# Patient Record
Sex: MALE | Race: BLACK OR AFRICAN AMERICAN | NOT HISPANIC OR LATINO | Employment: OTHER | URBAN - METROPOLITAN AREA
[De-identification: names, ages, dates, MRNs, and addresses within clinical notes are randomized per-mention and may not be internally consistent; named-entity substitution may affect disease eponyms.]

---

## 2017-03-02 DIAGNOSIS — I10 ESSENTIAL HYPERTENSION: Chronic | ICD-10-CM

## 2017-03-02 RX ORDER — ALBUTEROL SULFATE 90 UG/1
1-2 AEROSOL, METERED RESPIRATORY (INHALATION) EVERY 6 HOURS PRN
Qty: 1 INHALER | Refills: 0 | Status: SHIPPED | OUTPATIENT
Start: 2017-03-02 | End: 2017-03-03 | Stop reason: SDUPTHER

## 2017-03-02 RX ORDER — AMLODIPINE BESYLATE 10 MG/1
10 TABLET ORAL DAILY
Qty: 90 TABLET | Refills: 1 | Status: SHIPPED | OUTPATIENT
Start: 2017-03-02 | End: 2017-08-30 | Stop reason: SDUPTHER

## 2017-03-02 NOTE — TELEPHONE ENCOUNTER
----- Message from Erma Rudolph sent at 3/1/2017 10:11 AM CST -----  Contact: Self  Refill : amlodipine (NORVASC) 10 MG tablet    fluticasone-salmeterol 250-50 mcg/dose (ADVAIR DISKUS) 250-50 mcg/dose diskus inhaler        [id number] - [place], [place] - [address] [facility] [facility] AT [facility]

## 2017-03-02 NOTE — TELEPHONE ENCOUNTER
Patient is completely out of Amlodipine,has at least a 5 day supply of Advair.I scheduled an appointment with Dr. Self 3/7/2017

## 2017-03-03 DIAGNOSIS — J44.9 CHRONIC OBSTRUCTIVE PULMONARY DISEASE, UNSPECIFIED COPD TYPE: Primary | Chronic | ICD-10-CM

## 2017-03-03 RX ORDER — ALBUTEROL SULFATE 90 UG/1
1-2 AEROSOL, METERED RESPIRATORY (INHALATION) EVERY 6 HOURS PRN
Qty: 1 INHALER | Refills: 0 | Status: SHIPPED | OUTPATIENT
Start: 2017-03-03 | End: 2018-06-04 | Stop reason: SDUPTHER

## 2017-03-07 ENCOUNTER — OFFICE VISIT (OUTPATIENT)
Dept: FAMILY MEDICINE | Facility: CLINIC | Age: 77
End: 2017-03-07
Payer: MEDICARE

## 2017-03-07 VITALS
OXYGEN SATURATION: 95 % | SYSTOLIC BLOOD PRESSURE: 114 MMHG | DIASTOLIC BLOOD PRESSURE: 74 MMHG | WEIGHT: 148.25 LBS | HEART RATE: 96 BPM | TEMPERATURE: 98 F | BODY MASS INDEX: 21.96 KG/M2 | HEIGHT: 69 IN

## 2017-03-07 DIAGNOSIS — I10 ESSENTIAL HYPERTENSION: Primary | Chronic | ICD-10-CM

## 2017-03-07 DIAGNOSIS — J44.9 CHRONIC OBSTRUCTIVE PULMONARY DISEASE, UNSPECIFIED COPD TYPE: Chronic | ICD-10-CM

## 2017-03-07 DIAGNOSIS — Z72.0 TOBACCO ABUSE: Chronic | ICD-10-CM

## 2017-03-07 PROCEDURE — 99213 OFFICE O/P EST LOW 20 MIN: CPT | Mod: PBBFAC,PO | Performed by: FAMILY MEDICINE

## 2017-03-07 PROCEDURE — 99999 PR PBB SHADOW E&M-EST. PATIENT-LVL III: CPT | Mod: PBBFAC,,, | Performed by: FAMILY MEDICINE

## 2017-03-07 PROCEDURE — 99214 OFFICE O/P EST MOD 30 MIN: CPT | Mod: S$PBB,,, | Performed by: FAMILY MEDICINE

## 2017-03-07 RX ORDER — IBUPROFEN 200 MG
1 TABLET ORAL DAILY
Qty: 30 PATCH | Refills: 5 | Status: SHIPPED | OUTPATIENT
Start: 2017-03-07 | End: 2019-05-08

## 2017-03-07 RX ORDER — FLUTICASONE PROPIONATE AND SALMETEROL 250; 50 UG/1; UG/1
1 POWDER RESPIRATORY (INHALATION) 2 TIMES DAILY
Qty: 180 EACH | Refills: 3 | Status: SHIPPED | OUTPATIENT
Start: 2017-03-07 | End: 2017-08-30 | Stop reason: SDUPTHER

## 2017-03-07 NOTE — PROGRESS NOTES
[facility]  Progress Note    SUBJECTIVE:     Chief Complaint   Patient presents with    Establish [facility]       HPI   [place] GEETHA Wilson  is a 76 y.o. male here to establish care and for follow up of his COPD, HTN. He is on 4L oxygen continuously, but admits he is still smoking 4-5 cigarettes daily. Denies any SOB, chest pain, n/v.     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    Vocal cord polyps      Past Surgical History:   Procedure Laterality Date    TONSILLECTOMY       Family History   Problem Relation Age of Onset    Heart disease Father     Hypertension Father     Hypertension Son     Heart disease Maternal Uncle     Hypertension Maternal Grandmother     Hypertension Maternal Grandfather     Kidney disease Mother      Social History   Substance Use Topics    Smoking status: Current Some Day Smoker     Packs/day: 1.00     Years: 55.00    Smokeless tobacco: None      Comment: has been weaning down    Alcohol use 3.6 oz/week     6 Cans of beer per week      Comment: 1-2  DRINKS A WEEK        Review of Systems   Constitutional: Negative for chills, fever and malaise/fatigue.   HENT: Negative.    Respiratory: Negative.  Negative for cough and shortness of breath.    Cardiovascular: Negative.  Negative for chest pain.   Gastrointestinal: Negative.  Negative for abdominal pain, nausea and vomiting.   Genitourinary: Negative.    Neurological: Negative for weakness and headaches.   All other systems reviewed and are negative.    OBJECTIVE:     Vitals:    03/07/17 0943   BP: 114/74   Pulse: 96   Temp: 97.7 °F (36.5 °C)     Body mass index is 21.89 kg/(m^2).    Physical Exam   Constitutional: He is oriented  to person, place, and time and well-developed, well-nourished, and in no distress. No distress.   HENT:   Head: Normocephalic and atraumatic.   Eyes: Conjunctivae and EOM are normal.   Cardiovascular: Normal rate, regular rhythm and normal heart sounds.  Exam reveals no gallop and no friction rub.    No murmur heard.  Pulmonary/Chest: Effort normal and breath sounds normal. No respiratory distress. He has no wheezes. He has no rales.   Abdominal: Soft. Bowel sounds are normal. He exhibits no distension. There is no tenderness.   Neurological: He is alert and oriented to person, place, and time.   Skin: Skin is warm. He is not diaphoretic.       Old records were reviewed. Labs and/or images were independently reviewed.    ASSESSMENT     1. Essential hypertension    2. Chronic obstructive pulmonary disease, unspecified COPD type    3. Tobacco abuse        PLAN:     Essential hypertension   -     Stable. Continue current regimen.    Chronic obstructive pulmonary disease, unspecified COPD type  -     fluticasone-salmeterol 250-50 mcg/dose (ADVAIR DISKUS) 250-50 mcg/dose diskus inhaler; Inhale 1 puff into the lungs 2 (two) times daily.  Dispense: 180 each; Refill: 3  -     Still on 4-5 cigarettes daily, while on 4 L of oxygen continuously. Advised that he needs to QUIT SMOKING.    Tobacco abuse  -     nicotine (NICODERM CQ) 14 mg/24 hr; Place 1 patch onto the skin once daily.  Dispense: 30 patch; Refill: 5  -     Counseled patient about importance of smoking cessation. Patient ready to quit. Will start smoking cessation treatment options.    RTC PRN    Lewis Self MD  03/07/2017 10:02 AM

## 2017-08-30 DIAGNOSIS — I10 ESSENTIAL HYPERTENSION: Chronic | ICD-10-CM

## 2017-08-30 DIAGNOSIS — J44.9 CHRONIC OBSTRUCTIVE PULMONARY DISEASE, UNSPECIFIED COPD TYPE: Chronic | ICD-10-CM

## 2017-08-30 NOTE — TELEPHONE ENCOUNTER
----- Message from Erma Rudolph sent at 8/30/2017 11:47 AM CDT -----  Contact: Self  Refill : amlodipine (NORVASC) 10 MG tablet             fluticasone-salmeterol 250-50 mcg/dose (ADVAIR DISKUS) 250-50 mcg/dose diskus inhaler    Pharmacy     [id number] - [place] - [address] [place], [place] [address] AT [facility]

## 2017-08-31 RX ORDER — FLUTICASONE PROPIONATE AND SALMETEROL 250; 50 UG/1; UG/1
1 POWDER RESPIRATORY (INHALATION) 2 TIMES DAILY
Qty: 180 EACH | Refills: 2 | Status: SHIPPED | OUTPATIENT
Start: 2017-08-31 | End: 2018-06-04 | Stop reason: SDUPTHER

## 2017-08-31 RX ORDER — AMLODIPINE BESYLATE 10 MG/1
10 TABLET ORAL DAILY
Qty: 90 TABLET | Refills: 2 | Status: SHIPPED | OUTPATIENT
Start: 2017-08-31 | End: 2018-05-31 | Stop reason: SDUPTHER

## 2017-10-18 ENCOUNTER — HOSPITAL ENCOUNTER (INPATIENT)
Facility: HOSPITAL | Age: 77
LOS: 1 days | Discharge: HOME | DRG: 190 | End: 2017-10-19
Attending: EMERGENCY MEDICINE | Admitting: HOSPITALIST
Payer: MEDICARE

## 2017-10-18 DIAGNOSIS — J44.1 COPD EXACERBATION: Primary | ICD-10-CM

## 2017-10-18 PROBLEM — J96.21 ACUTE ON CHRONIC RESPIRATORY FAILURE WITH HYPOXIA AND HYPERCAPNIA: Status: ACTIVE | Noted: 2017-10-18

## 2017-10-18 PROBLEM — J96.22 ACUTE ON CHRONIC RESPIRATORY FAILURE WITH HYPOXIA AND HYPERCAPNIA: Status: ACTIVE | Noted: 2017-10-18

## 2017-10-18 LAB
ALBUMIN SERPL BCP-MCNC: 3.6 G/DL
ALP SERPL-CCNC: 74 U/L
ALT SERPL W/O P-5'-P-CCNC: 37 U/L
ANION GAP SERPL CALC-SCNC: 12 MMOL/L
AST SERPL-CCNC: 45 U/L
BASOPHILS # BLD AUTO: 0.02 K/UL
BASOPHILS NFR BLD: 0.3 %
BILIRUB SERPL-MCNC: 0.5 MG/DL
BNP SERPL-MCNC: 16 PG/ML
BUN SERPL-MCNC: 15 MG/DL
CALCIUM SERPL-MCNC: 9.4 MG/DL
CHLORIDE SERPL-SCNC: 99 MMOL/L
CO2 SERPL-SCNC: 31 MMOL/L
CREAT SERPL-MCNC: 1 MG/DL
DIFFERENTIAL METHOD: ABNORMAL
EOSINOPHIL # BLD AUTO: 0.2 K/UL
EOSINOPHIL NFR BLD: 3.5 %
ERYTHROCYTE [DISTWIDTH] IN BLOOD BY AUTOMATED COUNT: 15.8 %
EST. GFR  (AFRICAN AMERICAN): >60 ML/MIN/1.73 M^2
EST. GFR  (NON AFRICAN AMERICAN): >60 ML/MIN/1.73 M^2
GLUCOSE SERPL-MCNC: 83 MG/DL
HCT VFR BLD AUTO: 43.3 %
HGB BLD-MCNC: 14.4 G/DL
LYMPHOCYTES # BLD AUTO: 0.9 K/UL
LYMPHOCYTES NFR BLD: 13.7 %
MCH RBC QN AUTO: 28.2 PG
MCHC RBC AUTO-ENTMCNC: 33.3 G/DL
MCV RBC AUTO: 85 FL
MONOCYTES # BLD AUTO: 0.9 K/UL
MONOCYTES NFR BLD: 14 %
NEUTROPHILS # BLD AUTO: 4.3 K/UL
NEUTROPHILS NFR BLD: 68 %
PLATELET # BLD AUTO: 198 K/UL
PMV BLD AUTO: 10 FL
POTASSIUM SERPL-SCNC: 3.6 MMOL/L
PROT SERPL-MCNC: 7.5 G/DL
RBC # BLD AUTO: 5.11 M/UL
SODIUM SERPL-SCNC: 142 MMOL/L
TROPONIN I SERPL DL<=0.01 NG/ML-MCNC: 0.01 NG/ML
TROPONIN I SERPL DL<=0.01 NG/ML-MCNC: <0.006 NG/ML
WBC # BLD AUTO: 6.36 K/UL

## 2017-10-18 PROCEDURE — 83880 ASSAY OF NATRIURETIC PEPTIDE: CPT

## 2017-10-18 PROCEDURE — 96374 THER/PROPH/DIAG INJ IV PUSH: CPT

## 2017-10-18 PROCEDURE — 94640 AIRWAY INHALATION TREATMENT: CPT | Mod: 76

## 2017-10-18 PROCEDURE — 36415 COLL VENOUS BLD VENIPUNCTURE: CPT

## 2017-10-18 PROCEDURE — 84484 ASSAY OF TROPONIN QUANT: CPT | Mod: 91

## 2017-10-18 PROCEDURE — 99284 EMERGENCY DEPT VISIT MOD MDM: CPT | Mod: 25

## 2017-10-18 PROCEDURE — 11000001 HC ACUTE MED/SURG PRIVATE ROOM

## 2017-10-18 PROCEDURE — 63600175 PHARM REV CODE 636 W HCPCS: Performed by: EMERGENCY MEDICINE

## 2017-10-18 PROCEDURE — 85025 COMPLETE CBC W/AUTO DIFF WBC: CPT

## 2017-10-18 PROCEDURE — 93005 ELECTROCARDIOGRAM TRACING: CPT

## 2017-10-18 PROCEDURE — 80053 COMPREHEN METABOLIC PANEL: CPT

## 2017-10-18 PROCEDURE — 25000242 PHARM REV CODE 250 ALT 637 W/ HCPCS: Performed by: EMERGENCY MEDICINE

## 2017-10-18 PROCEDURE — 93010 ELECTROCARDIOGRAM REPORT: CPT | Mod: ,,, | Performed by: INTERNAL MEDICINE

## 2017-10-18 RX ORDER — ONDANSETRON 2 MG/ML
4 INJECTION INTRAMUSCULAR; INTRAVENOUS EVERY 12 HOURS PRN
Status: DISCONTINUED | OUTPATIENT
Start: 2017-10-18 | End: 2017-10-19 | Stop reason: HOSPADM

## 2017-10-18 RX ORDER — ALBUTEROL SULFATE 2.5 MG/.5ML
2.5 SOLUTION RESPIRATORY (INHALATION)
Status: COMPLETED | OUTPATIENT
Start: 2017-10-18 | End: 2017-10-18

## 2017-10-18 RX ORDER — PANTOPRAZOLE SODIUM 40 MG/1
40 TABLET, DELAYED RELEASE ORAL DAILY
Status: DISCONTINUED | OUTPATIENT
Start: 2017-10-19 | End: 2017-10-19 | Stop reason: HOSPADM

## 2017-10-18 RX ORDER — IPRATROPIUM BROMIDE 0.5 MG/2.5ML
0.5 SOLUTION RESPIRATORY (INHALATION)
Status: COMPLETED | OUTPATIENT
Start: 2017-10-18 | End: 2017-10-18

## 2017-10-18 RX ORDER — METHYLPREDNISOLONE SOD SUCC 125 MG
125 VIAL (EA) INJECTION EVERY 6 HOURS
Status: DISCONTINUED | OUTPATIENT
Start: 2017-10-18 | End: 2017-10-19 | Stop reason: HOSPADM

## 2017-10-18 RX ORDER — ENOXAPARIN SODIUM 100 MG/ML
40 INJECTION SUBCUTANEOUS EVERY 24 HOURS
Status: DISCONTINUED | OUTPATIENT
Start: 2017-10-18 | End: 2017-10-19 | Stop reason: HOSPADM

## 2017-10-18 RX ORDER — ACETAMINOPHEN 325 MG/1
650 TABLET ORAL EVERY 6 HOURS PRN
Status: DISCONTINUED | OUTPATIENT
Start: 2017-10-18 | End: 2017-10-19 | Stop reason: HOSPADM

## 2017-10-18 RX ORDER — IBUPROFEN 200 MG
1 TABLET ORAL DAILY
Status: DISCONTINUED | OUTPATIENT
Start: 2017-10-19 | End: 2017-10-19 | Stop reason: HOSPADM

## 2017-10-18 RX ORDER — IPRATROPIUM BROMIDE AND ALBUTEROL SULFATE 2.5; .5 MG/3ML; MG/3ML
3 SOLUTION RESPIRATORY (INHALATION) EVERY 6 HOURS
Status: DISCONTINUED | OUTPATIENT
Start: 2017-10-18 | End: 2017-10-19 | Stop reason: HOSPADM

## 2017-10-18 RX ADMIN — ENOXAPARIN SODIUM 40 MG: 100 INJECTION SUBCUTANEOUS at 07:10

## 2017-10-18 RX ADMIN — METHYLPREDNISOLONE SODIUM SUCCINATE 125 MG: 125 INJECTION, POWDER, FOR SOLUTION INTRAMUSCULAR; INTRAVENOUS at 01:10

## 2017-10-18 RX ADMIN — METHYLPREDNISOLONE SODIUM SUCCINATE 125 MG: 125 INJECTION, POWDER, FOR SOLUTION INTRAMUSCULAR; INTRAVENOUS at 06:10

## 2017-10-18 RX ADMIN — ALBUTEROL SULFATE 2.5 MG: 2.5 SOLUTION RESPIRATORY (INHALATION) at 02:10

## 2017-10-18 RX ADMIN — IPRATROPIUM BROMIDE 0.5 MG: 0.5 SOLUTION RESPIRATORY (INHALATION) at 01:10

## 2017-10-18 RX ADMIN — METHYLPREDNISOLONE SODIUM SUCCINATE 125 MG: 125 INJECTION, POWDER, FOR SOLUTION INTRAMUSCULAR; INTRAVENOUS at 11:10

## 2017-10-18 RX ADMIN — ALBUTEROL SULFATE 2.5 MG: 2.5 SOLUTION RESPIRATORY (INHALATION) at 01:10

## 2017-10-18 NOTE — SUBJECTIVE & OBJECTIVE
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      Current Outpatient Prescriptions on File Prior to Encounter   Medication Sig    albuterol 90 mcg/actuation inhaler Inhale 1-2 puffs into the lungs every 6 (six) hours as needed for Wheezing.    amlodipine (NORVASC) 10 MG tablet Take 1 tablet (10 mg total) by mouth once daily.    fluticasone-salmeterol 250-50 mcg/dose (ADVAIR DISKUS) 250-50 mcg/dose diskus inhaler Inhale 1 puff into the lungs 2 (two) times daily.    multivitamin (THERAGRAN) per tablet Take 1 tablet by mouth once daily.    nicotine (NICODERM CQ) 14 mg/24 hr Place 1 patch onto the skin once daily.     Family History     Problem Relation (Age of Onset)    Heart disease Father, Maternal Uncle    Hypertension Father, Son, Maternal Grandmother, Maternal Grandfather    Kidney disease Mother        Social History Main Topics    Smoking status: Current Some Day Smoker     Packs/day: 0.50     Years: 55.00    Smokeless tobacco: Never Used      Comment: has been weaning down    Alcohol use 3.6 oz/week     6 Cans of beer per week      Comment: 1-2  DRINKS A WEEK    Drug use: No    Sexual activity: Not Currently     Review of Systems   Constitutional: Negative for activity change and appetite change.   HENT: Negative for congestion and dental problem.    Eyes: Negative for discharge and itching.   Respiratory: Positive for shortness of breath. Negative for apnea and chest tightness.    Cardiovascular: Positive for chest pain. Negative for  leg swelling.   Gastrointestinal: Negative for abdominal distention and abdominal pain.   Endocrine: Negative for cold intolerance and heat intolerance.   Genitourinary: Negative for difficulty urinating and dysuria.     Objective:     Vital Signs (Most Recent):  Temp: 98 °F (36.7 °C) (10/18/17 1131)  Pulse: (!) 111 (10/18/17 1400)  Resp: 20 (10/18/17 1400)  BP: 136/76 (10/18/17 1212)  SpO2: 95 % (10/18/17 1400) Vital Signs (24h Range):  Temp:  [98 °F (36.7 °C)] 98 °F (36.7 °C)  Pulse:  [106-116] 111  Resp:  [20-21] 20  SpO2:  [73 %-95 %] 95 %  BP: (136-150)/(76-88) 136/76     Weight: 65.8 kg (145 lb)  Body mass index is 20.51 kg/m².    Physical Exam   Constitutional: He is oriented to person, place, and time. No distress.   HENT:   Head: Atraumatic.   Eyes: EOM are normal. Pupils are equal, round, and reactive to light.   Neck: Normal range of motion. Neck supple. No tracheal deviation present. No thyromegaly present.   Cardiovascular: Normal rate and regular rhythm.    Pulmonary/Chest: Effort normal and breath sounds normal.   Diminished breath sound   Abdominal: Soft. Bowel sounds are normal.   Musculoskeletal: Normal range of motion.   Neurological: He is oriented to person, place, and time. No cranial nerve deficit. Coordination normal.   Skin: Skin is warm and dry. He is not diaphoretic.   Psychiatric: He has a normal mood and affect. His behavior is normal.        Significant Labs:   BMP:   Recent Labs  Lab 10/18/17  1200   GLU 83      K 3.6   CL 99   CO2 31*   BUN 15   CREATININE 1.0   CALCIUM 9.4     CBC:   Recent Labs  Lab 10/18/17  1200   WBC 6.36   HGB 14.4   HCT 43.3          Significant Imaging: reviewed

## 2017-10-18 NOTE — HPI
This 77 y.o. Male with HTN, COPD, and chronic diastolic CHF presents to the ED c/o intermittent lower left side chest pain,which appear to be musculoskeletal, Symptoms began 2 days ago. However, patient states symptoms worsened by becoming constant and severe last night. There's associated SOB. Patient is on 3.5L O2 at home. There's no attempted treatment.he also reports a nosebleed this morning,he is not on OAC,. Patient denies numbness, weakness or cough. No alleviating/ exacerbating factors,he feel better at this time and stable on 4 liter Nc O 2.

## 2017-10-18 NOTE — ED TRIAGE NOTES
Pt presents to ER with c/o left sided chest pain that radiates to the left side of his back.  Pt also reports nose bleed this morning.  Wears 3.5 L home O2.  Arrived in ER with 5 L N/C, O2 sats 92%.  Pt reports increased SOB.  Vomited x 2 last night.  Denies nausea.  Reports nasal congestion and cough.

## 2017-10-18 NOTE — H&P
[facility] Hospital Medicine  History & Physical    Patient Name: Enzo Wilson  MRN: [id number]  Admission Date: 10/18/2017  Attending Physician: Yeison Brown III, MD   Primary Care Provider: Lewis Self MD         Patient information was obtained from patient and ER records.     Subjective:     Principal Problem:Chronic obstructive pulmonary disease with acute exacerbation    Chief Complaint:   Chief Complaint   Patient presents with    Chest Pain     intermittent left sided radiates to left side CP x 1 week. + SOB. Wears 3.5L of home O2.         HPI: This 77 y.o. Male with HTN, COPD, and chronic diastolic CHF presents to the ED c/o intermittent lower left side chest pain,which appear to be musculoskeletal, Symptoms began 2 days ago. However, patient states symptoms worsened by becoming constant and severe last night. There's associated SOB. Patient is on 3.5L O2 at home. There's no attempted treatment.he also reports a nosebleed this morning,he is not on OAC,. Patient denies numbness, weakness or cough. No alleviating/ exacerbating factors,he feel better at this time and stable on 4 liter Nc O 2.    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      Current Outpatient Prescriptions on File Prior to Encounter   Medication Sig    albuterol 90 mcg/actuation inhaler Inhale 1-2 puffs into the lungs every 6 (six) hours as needed for Wheezing.    amlodipine (NORVASC) 10 MG tablet Take 1 tablet (10 mg total) by mouth once daily.     fluticasone-salmeterol 250-50 mcg/dose (ADVAIR DISKUS) 250-50 mcg/dose diskus inhaler Inhale 1 puff into the lungs 2 (two) times daily.    multivitamin (THERAGRAN) per tablet Take 1 tablet by mouth once daily.    nicotine (NICODERM CQ) 14 mg/24 hr Place 1 patch onto the skin once daily.     Family History     Problem Relation (Age of Onset)    Heart disease Father, Maternal Uncle    Hypertension Father, Son, Maternal Grandmother, Maternal Grandfather    Kidney disease Mother        Social History Main Topics    Smoking status: Current Some Day Smoker     Packs/day: 0.50     Years: 55.00    Smokeless tobacco: Never Used      Comment: has been weaning down    Alcohol use 3.6 oz/week     6 Cans of beer per week      Comment: 1-2  DRINKS A WEEK    Drug use: No    Sexual activity: Not Currently     Review of Systems   Constitutional: Negative for activity change and appetite change.   HENT: Negative for congestion and dental problem.    Eyes: Negative for discharge and itching.   Respiratory: Positive for shortness of breath. Negative for apnea and chest tightness.    Cardiovascular: Positive for chest pain. Negative for leg swelling.   Gastrointestinal: Negative for abdominal distention and abdominal pain.   Endocrine: Negative for cold intolerance and heat intolerance.   Genitourinary: Negative for difficulty urinating and dysuria.     Objective:     Vital Signs (Most Recent):  Temp: 98 °F (36.7 °C) (10/18/17 1131)  Pulse: (!) 111 (10/18/17 1400)  Resp: 20 (10/18/17 1400)  BP: 136/76 (10/18/17 1212)  SpO2: 95 % (10/18/17 1400) Vital Signs (24h Range):  Temp:  [98 °F (36.7 °C)] 98 °F (36.7 °C)  Pulse:  [106-116] 111  Resp:  [20-21] 20  SpO2:  [73 %-95 %] 95 %  BP: (136-150)/(76-88) 136/76     Weight: 65.8 kg (145 lb)  Body mass index is 20.51 kg/m².    Physical Exam   Constitutional: He is oriented to person, place, and time. No distress.   HENT:   Head: Atraumatic.   Eyes: EOM are normal. Pupils are equal,  round, and reactive to light.   Neck: Normal range of motion. Neck supple. No tracheal deviation present. No thyromegaly present.   Cardiovascular: Normal rate and regular rhythm.    Pulmonary/Chest: Effort normal and breath sounds normal.   Diminished breath sound   Abdominal: Soft. Bowel sounds are normal.   Musculoskeletal: Normal range of motion.   Neurological: He is oriented to person, place, and time. No cranial nerve deficit. Coordination normal.   Skin: Skin is warm and dry. He is not diaphoretic.   Psychiatric: He has a normal mood and affect. His behavior is normal.        Significant Labs:   BMP:   Recent Labs  Lab 10/18/17  1200   GLU 83      K 3.6   CL 99   CO2 31*   BUN 15   CREATININE 1.0   CALCIUM 9.4     CBC:   Recent Labs  Lab 10/18/17  1200   WBC 6.36   HGB 14.4   HCT 43.3          Significant Imaging: reviewed    Assessment/Plan:     * Chronic obstructive pulmonary disease with acute exacerbation    Will continue with nebuzlier,IV solumedrol,and supportive care.          Acute on chronic respiratory failure with hypoxia and hypercapnia    Will continue with supplemental oxygen,stable a this time.          Squamous cell cancer of epiglottis    Follow as out patient with oncology.          Tobacco use disorder    Has been encouraged quit smoking,will place nicotine patch.          Essential hypertension    Will continue with Norvasc.            VTE Risk Mitigation     None             Cem Raymond MD  Department of Hospital Medicine   [facility]

## 2017-10-18 NOTE — PROGRESS NOTES
Arrived to floor via wheelchair, AAO x 4, no distress noted, denies any pain @ this time, can make needs know,oxygen in use @ 3 L/NC, plan of care reviewed with patient, safety maintained.

## 2017-10-18 NOTE — ED PROVIDER NOTES
Encounter Date: 10/18/2017    SCRIBE #1 NOTE: I, Ezra Aj, am scribing for, and in the presence of,  Yeison Brown III, MD. I have scribed the following portions of the note - Other sections scribed: HPI and ROS.       History     Chief Complaint   Patient presents with    Chest Pain     intermittent left sided radiates to left side CP x 1 week. + SOB. Wears 3.5L of home O2.      Chief Complaint: Chest Pain    HPI: This 77 y.o. Male with HTN, COPD, and CHF presents to the ED c/o intermittent lower left side chest pain. Symptoms began 2 days ago. However, patient states symptoms worsened by becoming constant and severe last night. There's associated SOB. Patient is on 3.5L O2 at home. There's no attempted treatment. Family also reports cold like symptoms recently and reports a nosebleed this morning. Patient denies numbness, weakness or cough. No alleviating/ exacerbating factors.      The history is provided by the patient. No  was used.     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    Vocal cord polyps      Past Surgical History:   Procedure Laterality Date    TONSILLECTOMY       Family History   Problem Relation Age of Onset    Heart disease Father     Hypertension Father     Hypertension Son     Heart disease Maternal Uncle     Hypertension Maternal Grandmother     Hypertension Maternal Grandfather     Kidney disease Mother      Social History   Substance Use Topics    Smoking status: Current Some Day Smoker     Packs/day: 0.50     Years: 55.00    Smokeless tobacco: Never Used      Comment: has been weaning down    Alcohol use 3.6 oz/week     6 Cans of beer per week      Comment: 1-2   DRINKS A WEEK     Review of Systems   Constitutional: Negative for chills and fever.   HENT: Negative for ear pain and sore throat.    Eyes: Negative for pain.   Respiratory: Positive for shortness of breath. Negative for cough.    Cardiovascular: Positive for chest pain.   Gastrointestinal: Negative for abdominal pain, diarrhea, nausea and vomiting.   Genitourinary: Negative for dysuria.   Musculoskeletal: Negative for myalgias (arm or leg pain).   Skin: Negative for rash.   Neurological: Negative for weakness, numbness and headaches.       Physical Exam     Initial Vitals [10/18/17 1131]   BP Pulse Resp Temp SpO2   (!) 140/86 (!) 116 (!) 21 98 °F (36.7 °C) (!) 73 %      MAP       104         Physical Exam    Constitutional: He appears well-developed and well-nourished. He is not diaphoretic. No distress.   HENT:   Head: Normocephalic and atraumatic.   Nose: Nose normal.   Mouth/Throat: Oropharynx is clear and moist. No oropharyngeal exudate.   Eyes: Conjunctivae and EOM are normal. Pupils are equal, round, and reactive to light. No scleral icterus.   Neck: Normal range of motion. Neck supple. No thyromegaly present. No tracheal deviation present.   Cardiovascular: Regular rhythm and normal heart sounds. Exam reveals no gallop and no friction rub.    No murmur heard.  Mild tachycardia   Pulmonary/Chest:   Decreased breath sounds throughout, especially on the left   Abdominal: Soft. Bowel sounds are normal. He exhibits no distension and no mass. There is no tenderness. There is no rebound and no guarding.   Musculoskeletal: Normal range of motion. He exhibits no edema or tenderness.   Lymphadenopathy:     He has no cervical adenopathy.   Neurological: He is alert and oriented to person, place, and time. He has normal strength. No cranial nerve deficit or sensory deficit.   Skin: Skin is warm and dry. No rash noted. No erythema. No pallor.   Psychiatric: He has a normal mood and affect. His behavior is normal.  Thought content normal.         ED Course   Critical Care  Date/Time: 10/18/2017 3:09 PM  Performed by: EVE ESTRELLA III  Authorized by: EVE ESTRELLA III   Direct patient critical care time: 20 minutes  Additional history critical care time: 4 minutes  Ordering / reviewing critical care time: 7 minutes  Documentation critical care time: 8 minutes  Consulting other physicians critical care time: 4 minutes  Total critical care time (exclusive of procedural time) : 43 minutes  Critical care time was exclusive of teaching time and separately billable procedures and treating other patients.  Critical care was necessary to treat or prevent imminent or life-threatening deterioration of the following conditions: respiratory failure.        Labs Reviewed   CBC W/ AUTO DIFFERENTIAL - Abnormal; Notable for the following:        Result Value    RDW 15.8 (*)     Lymph # 0.9 (*)     Lymph% 13.7 (*)     All other components within normal limits   COMPREHENSIVE METABOLIC PANEL - Abnormal; Notable for the following:     CO2 31 (*)     AST 45 (*)     All other components within normal limits   TROPONIN I   B-TYPE NATRIURETIC PEPTIDE             Medical Decision Making:   Initial Assessment:   77-year-old male with a history of COPD and CHF presents complaining of shortness of breath and chest pain as detailed above.  Reportedly oxygen saturation in the 70s at home according to EMS.   Differential Diagnosis:   COPD exacerbation, CHF exacerbation, noncardiogenic pulmonary edema, pneumothorax, pleural effusion, pneumonia  Independently Interpreted Test(s):   I have ordered and independently interpreted X-rays - see summary below.       <> Summary of X-Ray Reading(s): Chest x-ray: Unchanged from prior  I have ordered and independently interpreted EKG Reading(s) - see summary below       <> Summary of EKG Reading(s): Sinus tachycardia at 111 bpm, borderline left axis deviation, normal intervals, no definite acute ischemia  ED  Management:  Oxygen saturation 88-90% on 4 L by nasal cannula.  Patient's workup is largely unremarkable, suggesting the cause of his shortness of breath and hypoxia COPD exacerbation.  Will continue treatment with DuoNeb, IV Solu-Medrol, and admission to the hospital for monitoring.  Will also perform serial troponin.  Have discussed this case with Dr. Love; admission orders placed.            Scribe Attestation:   Scribe #1: I performed the above scribed service and the documentation accurately describes the services I performed. I attest to the accuracy of the note.    Attending Attestation:           Physician Attestation for Scribe:  Physician Attestation Statement for Scribe #1: I, Yeison Brown III, MD, reviewed documentation, as scribed by Ezra Aj in my presence, and it is both accurate and complete.                 ED Course      Clinical Impression:   The encounter diagnosis was COPD exacerbation.                           Yeison Brown III, MD  10/18/17 [id number]

## 2017-10-18 NOTE — ED NOTES
Patient is AAOx4 with NAD noted at this time. Patient is on 3LNC with O2 sat at 96%. Patient is not on oxygen at home. Patient has a aging spots over entire body and skin tears to right arm and both lower extremities. Patient has edema in left leg but not pitting. Patient denies pain and has wife at bedside. Patient has no skin breakdown and has a g-tube in place in the left upper quad. Patient experiences LE weakness bilaterally from time to time but ambulates without a walker. Patient has a pacemaker in place for A-fib. Patient g-tube is in place with 20ml of residual. G-tube flushes well. Bowel sounds are present and active in all quadrants. Patient stated having a bowel movement earlier today.

## 2017-10-19 VITALS
DIASTOLIC BLOOD PRESSURE: 79 MMHG | OXYGEN SATURATION: 94 % | TEMPERATURE: 96 F | WEIGHT: 131.63 LBS | BODY MASS INDEX: 18.85 KG/M2 | HEIGHT: 70 IN | HEART RATE: 103 BPM | RESPIRATION RATE: 18 BRPM | SYSTOLIC BLOOD PRESSURE: 132 MMHG

## 2017-10-19 LAB
ANION GAP SERPL CALC-SCNC: 11 MMOL/L
BASOPHILS # BLD AUTO: 0.02 K/UL
BASOPHILS NFR BLD: 0.4 %
BUN SERPL-MCNC: 16 MG/DL
CALCIUM SERPL-MCNC: 9.8 MG/DL
CHLORIDE SERPL-SCNC: 99 MMOL/L
CO2 SERPL-SCNC: 31 MMOL/L
CREAT SERPL-MCNC: 0.9 MG/DL
DIFFERENTIAL METHOD: ABNORMAL
EOSINOPHIL # BLD AUTO: 0 K/UL
EOSINOPHIL NFR BLD: 0 %
ERYTHROCYTE [DISTWIDTH] IN BLOOD BY AUTOMATED COUNT: 15.1 %
EST. GFR  (AFRICAN AMERICAN): >60 ML/MIN/1.73 M^2
EST. GFR  (NON AFRICAN AMERICAN): >60 ML/MIN/1.73 M^2
GLUCOSE SERPL-MCNC: 137 MG/DL
HCT VFR BLD AUTO: 44.8 %
HGB BLD-MCNC: 14.7 G/DL
LYMPHOCYTES # BLD AUTO: 0.7 K/UL
LYMPHOCYTES NFR BLD: 15.8 %
MCH RBC QN AUTO: 28.2 PG
MCHC RBC AUTO-ENTMCNC: 32.8 G/DL
MCV RBC AUTO: 86 FL
MONOCYTES # BLD AUTO: 0.1 K/UL
MONOCYTES NFR BLD: 2.4 %
NEUTROPHILS # BLD AUTO: 3.8 K/UL
NEUTROPHILS NFR BLD: 81.4 %
PLATELET # BLD AUTO: 205 K/UL
PMV BLD AUTO: 9.9 FL
POTASSIUM SERPL-SCNC: 4 MMOL/L
RBC # BLD AUTO: 5.22 M/UL
SODIUM SERPL-SCNC: 141 MMOL/L
TROPONIN I SERPL DL<=0.01 NG/ML-MCNC: <0.006 NG/ML
WBC # BLD AUTO: 4.63 K/UL

## 2017-10-19 PROCEDURE — 80048 BASIC METABOLIC PNL TOTAL CA: CPT

## 2017-10-19 PROCEDURE — 94761 N-INVAS EAR/PLS OXIMETRY MLT: CPT

## 2017-10-19 PROCEDURE — 85025 COMPLETE CBC W/AUTO DIFF WBC: CPT

## 2017-10-19 PROCEDURE — 36415 COLL VENOUS BLD VENIPUNCTURE: CPT

## 2017-10-19 PROCEDURE — 63600175 PHARM REV CODE 636 W HCPCS: Performed by: EMERGENCY MEDICINE

## 2017-10-19 PROCEDURE — 94640 AIRWAY INHALATION TREATMENT: CPT

## 2017-10-19 PROCEDURE — 25000242 PHARM REV CODE 250 ALT 637 W/ HCPCS: Performed by: EMERGENCY MEDICINE

## 2017-10-19 PROCEDURE — S4991 NICOTINE PATCH NONLEGEND: HCPCS | Performed by: HOSPITALIST

## 2017-10-19 PROCEDURE — 27000221 HC OXYGEN, UP TO 24 HOURS

## 2017-10-19 PROCEDURE — 25000003 PHARM REV CODE 250: Performed by: HOSPITALIST

## 2017-10-19 PROCEDURE — 25000003 PHARM REV CODE 250: Performed by: EMERGENCY MEDICINE

## 2017-10-19 RX ADMIN — METHYLPREDNISOLONE SODIUM SUCCINATE 125 MG: 125 INJECTION, POWDER, FOR SOLUTION INTRAMUSCULAR; INTRAVENOUS at 06:10

## 2017-10-19 RX ADMIN — IPRATROPIUM BROMIDE AND ALBUTEROL SULFATE 3 ML: .5; 3 SOLUTION RESPIRATORY (INHALATION) at 07:10

## 2017-10-19 RX ADMIN — PANTOPRAZOLE SODIUM 40 MG: 40 TABLET, DELAYED RELEASE ORAL at 08:10

## 2017-10-19 RX ADMIN — IPRATROPIUM BROMIDE AND ALBUTEROL SULFATE 3 ML: .5; 3 SOLUTION RESPIRATORY (INHALATION) at 01:10

## 2017-10-19 RX ADMIN — NICOTINE 1 PATCH: 21 PATCH, EXTENDED RELEASE TRANSDERMAL at 08:10

## 2017-10-19 NOTE — DISCHARGE SUMMARY
[facility] Medicine  Discharge Summary      Patient Name: Enzo Wilson  MRN: [id number]  Admission Date: 10/18/2017  Hospital Length of Stay: 1 days  Discharge Date and Time:  10/19/2017 10:35 AM  Attending Physician: Cem Raymond MD   Discharging Provider: Cem Raymond MD  Primary Care Provider: Lewis Self MD      HPI:   This 77 y.o. Male with HTN, COPD, and chronic diastolic CHF presents to the ED c/o intermittent lower left side chest pain,which appear to be musculoskeletal, Symptoms began 2 days ago. However, patient states symptoms worsened by becoming constant and severe last night. There's associated SOB. Patient is on 3.5L O2 at home. There's no attempted treatment.he also reports a nosebleed this morning,he is not on OAC,. Patient denies numbness, weakness or cough. No alleviating/ exacerbating factors,he feel better at this time and stable on 4 liter Nc O 2.    * No surgery found *      Indwelling Lines/Drains at time of discharge:   Lines/Drains/Airways          No matching active lines, drains, or airways        Hospital Course:   This 77 y.o. Male with HTN, COPD, and chronic diastolic CHF presents to the ED c/o intermittent lower left side chest pain,which appeared to be musculoskeletal, Symptoms began 2 days ago. However, patient states symptoms worsened by becoming constant and severe night before admission,There's associated SOB. Patient is on 3.5L O2 at home. There's no attempted treatment.he also reports a nosebleed ,he is not on OAC,. Patient denies numbness, weakness or cough. No alleviating/ exacerbating factors,hus oxygen saturation was about 88% on NC,he was started on nebulizer,solumedrol and supplemental oxygen for Acute on chronic hypercapnic,hypoxic respiratory failure duo to COPD exacerbation,he had improvement in his respiratory status faster than expected and was back to his baseline home O 2 requirement,his chest pain resolved,he  has been discharged home with follow up with PCP in next few days.     Consults:     Significant Diagnostic Studies: Labs:   BMP:   Recent Labs  Lab 10/18/17  1200 10/19/17  0640   GLU 83 137*    141   K 3.6 4.0   CL 99 99   CO2 31* 31*   BUN 15 16   CREATININE 1.0 0.9   CALCIUM 9.4 9.8    and CBC   Recent Labs  Lab 10/18/17  1200 10/19/17  0640   WBC 6.36 4.63   HGB 14.4 14.7   HCT 43.3 44.8    205     Radiology: X-Ray: CXR: X-Ray Chest 1 View (CXR): No results found for this visit on 10/18/17.    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  Service: Hospital Medicine      Discharged Condition: stable    Disposition: Home or Self Care    Follow Up:  Follow-up Information     Lewis Self MD In 1 week.    Specialty:  Family Medicine  Contact information:  [address] [place] [address]  [place] [address] [phone]                 Patient Instructions:     Diet general     Activity as tolerated       Medications:  Reconciled Home Medications:   Current Discharge Medication List      CONTINUE these medications which have NOT CHANGED    Details   albuterol 90 mcg/actuation inhaler Inhale 1-2 puffs into the lungs every 6 (six) hours as needed for Wheezing.  Qty: 1 Inhaler, Refills: 0    Associated Diagnoses: Chronic obstructive pulmonary  disease, unspecified COPD type      amlodipine (NORVASC) 10 MG tablet Take 1 tablet (10 mg total) by mouth once daily.  Qty: 90 tablet, Refills: 2    Associated Diagnoses: Essential hypertension      fluticasone-salmeterol 250-50 mcg/dose (ADVAIR DISKUS) 250-50 mcg/dose diskus inhaler Inhale 1 puff into the lungs 2 (two) times daily.  Qty: 180 each, Refills: 2    Associated Diagnoses: Chronic obstructive pulmonary disease, unspecified COPD type      multivitamin (THERAGRAN) per tablet Take 1 tablet by mouth once daily.      nicotine (NICODERM CQ) 14 mg/24 hr Place 1 patch onto the skin once daily.  Qty: 30 patch, Refills: 5    Associated Diagnoses: Tobacco abuse           Time spent on the discharge of patient: 30  minutes      Cem Raymond MD  Department of Hospital Medicine  [facility]

## 2017-10-19 NOTE — PLAN OF CARE
Problem: Patient Care Overview  Goal: Plan of Care Review  Outcome: Ongoing (interventions implemented as appropriate)  Resting appropriately. Denies SOB. O2 at 3L. Telemetry monitoring in place. Remains free from falls or trauma.

## 2017-10-19 NOTE — PROGRESS NOTES
Patient is discharged. Saline lock removed. Cardiac monitoring removed and monitor tech notified. No changes were made to your medications. Discharge instructions given to patient. All questions answered. Follow up appointment given to patient. Has home oxygen. Waiting for his ride.

## 2017-10-19 NOTE — PROGRESS NOTES
[facility]    WRITTEN HEALTHCARE AND DISCHARGE INFORMATION     Follow-up Information     Lewis Self MD On 10/26/2017.    Specialty:  Family Medicine  Why:  @1:00pm for hospital follow up, Outpatient services  Contact information:  [place] [address]  [phone]                                      Help at Home           [phone]  After discharge for assistance [facility] On Call Nurse Care Line 24/7  Assistance    Things You are responsible For To Manage Your Care At Home:  1.    Getting your prescriptions filled   2.    Taking your medications as directed, DO NOT MISS ANY DOSES!  3.    Going to your follow-up doctor appointment. This is important because it  allow the doctor to monitor your progress and determine if  any changes need to made to your treatment plan.     Thank you for choosing [facility] for your care.  Please answer any calls you may receive from [facility] we want to continue to support you as you manage your healthcare needs. [facility] is happy to have the opportunity to serve you.     Sincerely,  Your [facility] Team,  ILDA Mckeon, ACM-RN; Senior Transition Navigator [phone]    Above information printed and presented to patient and sister both who verbalized understanding

## 2017-10-19 NOTE — PLAN OF CARE
"   10/19/17 1624   Final Note   Assessment Type Final Discharge Note   Discharge Disposition Home   What phone number can be called within the next 1-3 days to see how you are doing after discharge? ([phone])   Hospital Follow Up  Appt(s) scheduled? Yes   Discharge plans and expectations educations in teach back method with documentation complete? Yes   Right Care Referral Info   Post Acute Recommendation No Care   Patient / Family provided with educational information on COPD.  Information reviewed and placed in :My Healthcare Packet" to be brought home for patient / family  to use as resource after discharge.  Information included:  signs and symptoms to look for and call the doctor if experiencing, and symptoms that may indicate a medical emergency: CALL 911.    Reminded patient things they will be responsible for to manage healthcare at home: getting Rx filled, attending follow up appointments, and taking medication as prescribed were discussed.   Teach back method used.  All questions answered.  Patient verbalized understanding of all information.      NurseThea notified that all CM needs are met.      "

## 2017-10-19 NOTE — PLAN OF CARE
"TN to patient's room to discuss Helping the patient manage care at home.   TN/SW roll explained to pt.  Teach back method used.  TN's name and contact info placed on white board.  Pt/family encouraged to call for any problems/concerns with DC. "Discharge planning begins on Admission" pamphlet discussed and placed in "My Health Packet" and placed at bedside.        10/19/17 1058   Discharge Assessment   Assessment Type Discharge Planning Assessment   Confirmed/corrected address and phone number on facesheet? Yes   Assessment information obtained from? Patient   Expected Length of Stay (days) (discharge written)   Communicated expected length of stay with patient/caregiver yes   Prior to hospitilization cognitive status: Alert/Oriented   Prior to hospitalization functional status: Independent   Current cognitive status: Alert/Oriented   Current Functional Status: Independent   Lives With sibling(s)   Able to Return to Prior Arrangements yes   Is patient able to care for self after discharge? Yes   Who are your caregiver(s) and their phone number(s)? Sister and niece able to help at home if needed   Patient's perception of discharge disposition home or selfcare   Readmission Within The Last 30 Days no previous admission in last 30 days   Patient currently being followed by outpatient case management? No   Patient currently receives any other outside agency services? No   Equipment Currently Used at Home oxygen   Do you have any problems affording any of your prescribed medications? No   Is the patient taking medications as prescribed? yes   Does the patient have transportation home? Yes   Transportation Available family or friend will provide   Does the patient receive services at the Coumadin Clinic? No   Discharge Plan A Home with family   Discharge Plan B (n/a)   Patient/Family In Agreement With Plan yes     [id number] - [place] - [address] [place] [address] AT [address]  [place] [address]  Phone: [phone] Fax: [phone]    [facility] - [place] - [address]  [address] [facility] [place] [address]  Phone: [phone] Fax: [phone]

## 2017-10-19 NOTE — PROGRESS NOTES
Discharged..ride is here. Discharge instructions explained to her also. Left unit per wheelchair with transport with oxygen in progress.

## 2017-10-19 NOTE — HOSPITAL COURSE
This 77 y.o. Male with HTN, COPD, and chronic diastolic CHF presents to the ED c/o intermittent lower left side chest pain,which appeared to be musculoskeletal, Symptoms began 2 days ago. However, patient states symptoms worsened by becoming constant and severe night before admission,There's associated SOB. Patient is on 3.5L O2 at home. There's no attempted treatment.he also reports a nosebleed ,he is not on OAC,. Patient denies numbness, weakness or cough. No alleviating/ exacerbating factors,hus oxygen saturation was about 88% on NC,he was started on nebulizer,solumedrol and supplemental oxygen for Acute on chronic hypercapnic,hypoxic respiratory failure duo to COPD exacerbation,he had improvement in his respiratory status faster than expected and was back to his baseline home O 2 requirement,his chest pain resolved,he has been discharged home with follow up with PCP in next few days.

## 2017-10-20 ENCOUNTER — PATIENT OUTREACH (OUTPATIENT)
Dept: ADMINISTRATIVE | Facility: CLINIC | Age: 77
End: 2017-10-20

## 2017-10-20 NOTE — Clinical Note
Please forward this important TCC information to your provider in order to maximize the post discharge care delivery of this patient.  C3 nurse attempted to contact Enzo Wilson  for a TCC post hospital discharge follow up call. No answer. C3 nurse left a voice message and OOC # given. The patient has a scheduled HOSFU appointment with Lewis Self MD  on 10/26 @ 1300.  Respectfully, Gardenia Farooq RN  [facility] C3  [email or web address]  Please do not reply to this message, as this inbox is not routinely monitored.

## 2017-10-20 NOTE — PROGRESS NOTES
C3 nurse attempted to contact patient. No answer. The following message was left for the patient to return the call:  Good afternoon  I am a nurse calling on behalf of [facility] from the Care Coordination Center.  This is a Transitional Care Call for Enzo Wilson . When you have a moment please contact us at [phone] or [phone] Monday through Friday, between the hours of 8 am to 4 pm. We look forward to speaking with you. On behalf of [facility] have a nice day.    The patient has a scheduled HOS appointment with Lewis Self MD  on 10/26 @ 1300. Message sent to Physician staff.

## 2017-10-23 NOTE — PATIENT INSTRUCTIONS
Fall Prevention   Falls often occur due to slipping, tripping or losing your balance. Here are ways to reduce your risk of falling again.   Was there anything that caused your fall that can be fixed, removed or replaced?   Make your home safe by keeping walkways clear of objects you may trip over.   Use non-slip pads under rugs.   Do not walk in poorly lit areas.   Do not stand on chairs or wobbly ladders.   Use caution when reaching overhead or looking upward. This position can cause a loss of balance.   Be sure your shoes fit properly, have non-slip bottoms and are in good condition.   Be cautious when going up and down stairs, curbs, and when walking on uneven sidewalks.   If your balance is poor, consider using a cane or walker.   If your fall was related to alcohol use, stop or limit alcohol intake.   If your fall was related to use of sleeping medicines, talk to your doctor about this. You may need to reduce your dosage at bedtime if you awaken during the night to go to the bathroom.   To reduce the need for nighttime bathroom trips:   Avoid drinking fluids for several hours before going to bed   Empty your bladder before going to bed   Men can keep a urinal at the bedside  Stay as active as you can. Balance, flexibility, strength, and endurance all come from exercise. They all play a role in preventing falls. Ask your heathcare provider which types of activity are right for you.  © 3175-3884 [employer]. [address] [place] [address], [place] [address]. All rights reserved. This information is not intended as a substitute for professional medical care. Always follow your healthcare professional's instructions.

## 2017-10-26 ENCOUNTER — OFFICE VISIT (OUTPATIENT)
Dept: FAMILY MEDICINE | Facility: CLINIC | Age: 77
End: 2017-10-26
Payer: MEDICARE

## 2017-10-26 VITALS
WEIGHT: 136.56 LBS | DIASTOLIC BLOOD PRESSURE: 60 MMHG | OXYGEN SATURATION: 95 % | HEART RATE: 107 BPM | BODY MASS INDEX: 19.55 KG/M2 | SYSTOLIC BLOOD PRESSURE: 98 MMHG | HEIGHT: 70 IN | TEMPERATURE: 98 F

## 2017-10-26 DIAGNOSIS — J44.9 CHRONIC OBSTRUCTIVE PULMONARY DISEASE, UNSPECIFIED COPD TYPE: Primary | ICD-10-CM

## 2017-10-26 DIAGNOSIS — I10 ESSENTIAL HYPERTENSION: Chronic | ICD-10-CM

## 2017-10-26 DIAGNOSIS — Z72.0 TOBACCO ABUSE: Chronic | ICD-10-CM

## 2017-10-26 DIAGNOSIS — Z23 PNEUMOCOCCAL VACCINATION ADMINISTERED AT CURRENT VISIT: ICD-10-CM

## 2017-10-26 PROCEDURE — 99495 TRANSJ CARE MGMT MOD F2F 14D: CPT | Mod: S$PBB,,, | Performed by: FAMILY MEDICINE

## 2017-10-26 PROCEDURE — 99999 PR PBB SHADOW E&M-EST. PATIENT-LVL III: CPT | Mod: PBBFAC,,, | Performed by: FAMILY MEDICINE

## 2017-10-26 PROCEDURE — G0008 ADMIN INFLUENZA VIRUS VAC: HCPCS | Mod: PBBFAC,PO

## 2017-10-26 PROCEDURE — 99213 OFFICE O/P EST LOW 20 MIN: CPT | Mod: PBBFAC,PO | Performed by: FAMILY MEDICINE

## 2017-10-26 PROCEDURE — 99495 TRANSJ CARE MGMT MOD F2F 14D: CPT | Mod: PBBFAC,PO | Performed by: FAMILY MEDICINE

## 2017-10-26 RX ORDER — IPRATROPIUM BROMIDE AND ALBUTEROL SULFATE 2.5; .5 MG/3ML; MG/3ML
3 SOLUTION RESPIRATORY (INHALATION) EVERY 6 HOURS PRN
Qty: 100 VIAL | Refills: 1 | Status: SHIPPED | OUTPATIENT
Start: 2017-10-26 | End: 2019-10-15 | Stop reason: SDUPTHER

## 2017-10-26 NOTE — PROGRESS NOTES
[facility]  Progress Note    SUBJECTIVE:     Chief Complaint   Patient presents with    Hospital Follow Up       HPI   Enzo Wilson  is a 77 y.o. male here for hospital follow-up for COPD exacerbation. He is doing much better today. Currently breathing much better. Doesn't have nebulizer machine at home. Usually gets short winded on exertion. Otherwise no fevers, chills, cough, current SOB. He still smokes cigarettes daily but understands he has to quit.     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    Vocal cord polyps      Past Surgical History:   Procedure Laterality Date    TONSILLECTOMY       Family History   Problem Relation Age of Onset    Heart disease Father     Hypertension Father     Hypertension Son     Heart disease Maternal Uncle     Hypertension Maternal Grandmother     Hypertension Maternal Grandfather     Kidney disease Mother      Social History   Substance Use Topics    Smoking status: Current Some Day Smoker     Packs/day: 0.50     Years: 55.00    Smokeless tobacco: Never Used      Comment: has been weaning down    Alcohol use 3.6 oz/week     6 Cans of beer per week      Comment: 1-2  DRINKS A WEEK        Review of Systems   Constitutional: Positive for malaise/fatigue. Negative for chills and fever.   HENT: Negative.  Negative for congestion and sore throat.    Respiratory: Positive for shortness of breath (on exertion). Negative for cough.    Cardiovascular: Negative.  Negative for chest pain.   Gastrointestinal: Negative.  Negative for abdominal pain, nausea and vomiting.   Genitourinary: Negative.    Musculoskeletal: Negative for myalgias.   Neurological: Negative for  weakness and headaches.   All other systems reviewed and are negative.    OBJECTIVE:     Vitals:    10/26/17 1324   BP: 98/60   Pulse: 107   Temp: 97.9 °F (36.6 °C)     Body mass index is 19.6 kg/m².    Physical Exam   Constitutional: He is oriented to person, place, and time and well-developed, well-nourished, and in no distress. No distress.   HENT:   Head: Normocephalic and atraumatic.   Nose: Nose normal.   4L NC   Eyes: Conjunctivae and EOM are normal.   Cardiovascular: Normal rate, regular rhythm and normal heart sounds.  Exam reveals no gallop and no friction rub.    No murmur heard.  Pulmonary/Chest: Effort normal. No respiratory distress. He has no wheezes. He has no rales. He exhibits no tenderness.   + decreased breath sounds b/l   Abdominal: Soft. Bowel sounds are normal. He exhibits no distension. There is no tenderness. There is no rebound.   Neurological: He is alert and oriented to person, place, and time.   Skin: Skin is warm. He is not diaphoretic.     Transitional Care Note    Family and/or Caretaker present at visit?  Yes.  Diagnostic tests reviewed/disposition: No diagnosic tests pending after this hospitalization.  Disease/illness education: COPD, tobacco abuse  Home health/community services discussion/referrals: Patient does not have home health established from hospital visit.  They do not need home health.  If needed, we will set up home health for the patient.   Establishment or re-establishment of referral orders for community resources: No other necessary community resources.   Discussion with other health care providers: No discussion with other health care providers necessary.       Old records were reviewed. Labs and/or images were independently reviewed.    ASSESSMENT     1. Chronic obstructive pulmonary disease, unspecified COPD type    2. Tobacco abuse    3. Essential hypertension    4. Pneumococcal vaccination administered at current visit        PLAN:     Chronic obstructive  pulmonary disease, unspecified COPD type  -     NEBULIZER KIT (SUPPLIES) FOR HOME USE  -     NEBULIZER FOR HOME USE  -     Start albuterol-ipratropium 2.5mg-0.5mg/3mL (DUO-NEB) 0.5 mg-3 mg(2.5 mg base)/3 mL nebulizer solution; Take 3 mLs by nebulization every 6 (six) hours as needed for Wheezing or Shortness of Breath.  Dispense: 100 vial; Refill: 1  -     Will need to patient a nebulizer machine so that he can use as needed for any shortness of breath.     Tobacco abuse   -     Counseled patient about importance of smoking cessation. Patient ready to quit. Will start smoking cessation treatment options.    Essential hypertension   -     Stable. Continue current regimen.    Pneumococcal vaccination administered at current visit  -     Influenza - High Dose (65+) (PF) (IM)      RTC PRMARGOT Self MD  10/26/2017 1:39 PM

## 2018-02-15 ENCOUNTER — TELEPHONE (OUTPATIENT)
Dept: SMOKING CESSATION | Facility: CLINIC | Age: 78
End: 2018-02-15

## 2018-02-20 ENCOUNTER — TELEPHONE (OUTPATIENT)
Dept: SMOKING CESSATION | Facility: CLINIC | Age: 78
End: 2018-02-20

## 2018-02-26 ENCOUNTER — TELEPHONE (OUTPATIENT)
Dept: SMOKING CESSATION | Facility: CLINIC | Age: 78
End: 2018-02-26

## 2018-03-22 ENCOUNTER — TELEPHONE (OUTPATIENT)
Dept: FAMILY MEDICINE | Facility: CLINIC | Age: 78
End: 2018-03-22

## 2018-03-22 NOTE — TELEPHONE ENCOUNTER
"Spoke with patient's daughter, Jocelyn.  Family member has moved into the home to take care of the patient.  "The state will pay for the family member to take care of my Dad."  Requesting state form for the provider to complete, stating all chronic conditions.  Patient's daughter does not have the form or know the name of it.  Patient's daughter unable to care for her father due to her own medical problems.    Family Member's Name (niece)-Gisell Janice.    "

## 2018-03-22 NOTE — TELEPHONE ENCOUNTER
----- Message from Josseline Guzmán sent at 3/22/2018 12:11 PM CDT -----  Contact: daughter [phone]  Jocelyn  Pt daughter is requesting to speak to you regarding paper work. Pls call daughter Jocelyn [phone]. Thanks.................Mikki

## 2018-03-26 NOTE — TELEPHONE ENCOUNTER
Left a message informing the pt's daughter (Jocelyn) that they should go to the social security office to get the forms so that Dr. Self can fill out. I also informed her that an appointment will possibly have to be made so that the forms can be filled out.

## 2018-05-07 ENCOUNTER — PES CALL (OUTPATIENT)
Dept: ADMINISTRATIVE | Facility: CLINIC | Age: 78
End: 2018-05-07

## 2018-05-31 DIAGNOSIS — I10 ESSENTIAL HYPERTENSION: Chronic | ICD-10-CM

## 2018-05-31 RX ORDER — AMLODIPINE BESYLATE 10 MG/1
10 TABLET ORAL DAILY
Qty: 90 TABLET | Refills: 1 | Status: SHIPPED | OUTPATIENT
Start: 2018-05-31 | End: 2018-06-04 | Stop reason: SDUPTHER

## 2018-05-31 NOTE — TELEPHONE ENCOUNTER
----- Message from Veronique Johnson sent at 5/31/2018  9:24 AM CDT -----  Contact: self  Refill: amlodipine (NORVASC) 10 MG tablet. Dinesh Dietz. Pt [phone].

## 2018-06-04 ENCOUNTER — OFFICE VISIT (OUTPATIENT)
Dept: FAMILY MEDICINE | Facility: CLINIC | Age: 78
End: 2018-06-04
Payer: MEDICARE

## 2018-06-04 VITALS
SYSTOLIC BLOOD PRESSURE: 112 MMHG | WEIGHT: 141.63 LBS | HEART RATE: 83 BPM | HEIGHT: 70 IN | DIASTOLIC BLOOD PRESSURE: 64 MMHG | BODY MASS INDEX: 20.28 KG/M2 | OXYGEN SATURATION: 96 % | TEMPERATURE: 98 F

## 2018-06-04 DIAGNOSIS — C32.1 SQUAMOUS CELL CANCER OF EPIGLOTTIS: Chronic | ICD-10-CM

## 2018-06-04 DIAGNOSIS — J44.9 CHRONIC OBSTRUCTIVE PULMONARY DISEASE, UNSPECIFIED COPD TYPE: Primary | Chronic | ICD-10-CM

## 2018-06-04 DIAGNOSIS — I70.0 ATHEROSCLEROSIS OF AORTA: Chronic | ICD-10-CM

## 2018-06-04 DIAGNOSIS — J96.10 CHRONIC RESPIRATORY FAILURE, UNSPECIFIED WHETHER WITH HYPOXIA OR HYPERCAPNIA: Chronic | ICD-10-CM

## 2018-06-04 DIAGNOSIS — I10 ESSENTIAL HYPERTENSION: Chronic | ICD-10-CM

## 2018-06-04 DIAGNOSIS — E44.1 MILD MALNUTRITION: ICD-10-CM

## 2018-06-04 PROCEDURE — 99213 OFFICE O/P EST LOW 20 MIN: CPT | Mod: PBBFAC,PO | Performed by: FAMILY MEDICINE

## 2018-06-04 PROCEDURE — 99999 PR PBB SHADOW E&M-EST. PATIENT-LVL III: CPT | Mod: PBBFAC,,, | Performed by: FAMILY MEDICINE

## 2018-06-04 PROCEDURE — 99214 OFFICE O/P EST MOD 30 MIN: CPT | Mod: S$PBB,,, | Performed by: FAMILY MEDICINE

## 2018-06-04 RX ORDER — FLUTICASONE PROPIONATE AND SALMETEROL 250; 50 UG/1; UG/1
1 POWDER RESPIRATORY (INHALATION) 2 TIMES DAILY
Qty: 180 EACH | Refills: 6 | Status: SHIPPED | OUTPATIENT
Start: 2018-06-04 | End: 2018-11-26 | Stop reason: SDUPTHER

## 2018-06-04 RX ORDER — AMLODIPINE BESYLATE 10 MG/1
10 TABLET ORAL DAILY
Qty: 90 TABLET | Refills: 3 | Status: SHIPPED | OUTPATIENT
Start: 2018-06-04 | End: 2018-11-26 | Stop reason: SDUPTHER

## 2018-06-04 RX ORDER — ALBUTEROL SULFATE 90 UG/1
1-2 AEROSOL, METERED RESPIRATORY (INHALATION) EVERY 6 HOURS PRN
Qty: 1 INHALER | Refills: 3 | Status: SHIPPED | OUTPATIENT
Start: 2018-06-04 | End: 2018-11-26 | Stop reason: SDUPTHER

## 2018-06-04 NOTE — PROGRESS NOTES
[facility]  Progress Note    SUBJECTIVE:     Chief Complaint   Patient presents with    Annual Exam       HPI   [place] GEETHA Wilson  is a 77 y.o. male with extensive medical history, here for check-up. He is doing well, on 3 L NC due to his severe COPD. Breathing well otherwise. Takes meds as directed. Patient has no other new complaints/problems at this time.      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    Vocal cord polyps      Past Surgical History:   Procedure Laterality Date    TONSILLECTOMY       Family History   Problem Relation Age of Onset    Heart disease Father     Hypertension Father     Hypertension Son     Heart disease Maternal Uncle     Hypertension Maternal Grandmother     Hypertension Maternal Grandfather     Kidney disease Mother      Social History   Substance Use Topics    Smoking status: Current Some Day Smoker     Packs/day: 0.50     Years: 55.00    Smokeless tobacco: Never Used      Comment: has been weaning down    Alcohol use 3.6 oz/week     6 Cans of beer per week      Comment: 1-2  DRINKS A WEEK        Review of Systems   Constitutional: Negative for chills, fever and malaise/fatigue.   HENT: Negative.  Negative for congestion and sore throat.    Respiratory: Positive for shortness of breath. Negative for cough, sputum production and wheezing.    Cardiovascular: Negative.  Negative for chest pain.   Gastrointestinal: Negative.  Negative for abdominal pain, nausea and vomiting.   Genitourinary: Negative.    Neurological: Negative for weakness and headaches.   All other systems reviewed and are negative.    OBJECTIVE:     Vitals:    06/04/18 1322   BP: 112/64   Pulse: 83    Temp: 97.7 °F (36.5 °C)     Body mass index is 20.32 kg/m².    Physical Exam   Constitutional: He is oriented to person, place, and time and well-developed, well-nourished, and in no distress. No distress.   HENT:   Head: Normocephalic and atraumatic.   Nose: Nose normal.   Eyes: Conjunctivae and EOM are normal.   Cardiovascular: Normal rate, regular rhythm and normal heart sounds.  Exam reveals no gallop and no friction rub.    No murmur heard.  Pulmonary/Chest: Breath sounds normal. He is in respiratory distress (chronic, coarse breath sounds). He has no wheezes. He has no rales. He exhibits no tenderness.   Abdominal: Soft. Bowel sounds are normal. He exhibits no distension. There is no tenderness. There is no rebound.   Neurological: He is alert and oriented to person, place, and time.   Skin: Skin is warm. He is not diaphoretic.       Old records were reviewed. Labs and/or images were independently reviewed.    ASSESSMENT     1. Chronic obstructive pulmonary disease, unspecified COPD type    2. Essential hypertension    3. Atherosclerosis of aorta    4. Mild malnutrition    5. Chronic respiratory failure, unspecified whether with hypoxia or hypercapnia    6. History of Squamous cell cancer of epiglottis        PLAN:     Chronic obstructive pulmonary disease, unspecified COPD type  -     fluticasone-salmeterol 250-50 mcg/dose (ADVAIR DISKUS) 250-50 mcg/dose diskus inhaler; Inhale 1 puff into the lungs 2 (two) times daily.  Dispense: 180 each; Refill: 6  -     albuterol 90 mcg/actuation inhaler; Inhale 1-2 puffs into the lungs every 6 (six) hours as needed for Wheezing.  Dispense: 1 Inhaler; Refill: 3  -     Stable. Continue current regimen.    Essential hypertension  -     amLODIPine (NORVASC) 10 MG tablet; Take 1 tablet (10 mg total) by mouth once daily.  Dispense: 90 tablet; Refill: 3  -     Stable. Continue current regimen.    Atherosclerosis of aorta   -     Stable. Continue current regimen.    Mild  malnutrition   -     Stable. Continue current regimen.    Chronic respiratory failure, unspecified whether with hypoxia or hypercapnia   -     Stable. Continue current regimen.    History of Squamous cell cancer of epiglottis   -     Stable. Continue current regimen.      RTC PRN    Lewis Self MD  06/04/2018 1:39 PM

## 2018-11-26 DIAGNOSIS — J44.9 CHRONIC OBSTRUCTIVE PULMONARY DISEASE, UNSPECIFIED COPD TYPE: Chronic | ICD-10-CM

## 2018-11-26 DIAGNOSIS — I10 ESSENTIAL HYPERTENSION: Chronic | ICD-10-CM

## 2018-11-26 RX ORDER — FLUTICASONE PROPIONATE AND SALMETEROL 250; 50 UG/1; UG/1
1 POWDER RESPIRATORY (INHALATION) 2 TIMES DAILY
Qty: 180 EACH | Refills: 6 | Status: SHIPPED | OUTPATIENT
Start: 2018-11-26 | End: 2019-12-02 | Stop reason: SDUPTHER

## 2018-11-26 RX ORDER — ALBUTEROL SULFATE 90 UG/1
1-2 AEROSOL, METERED RESPIRATORY (INHALATION) EVERY 6 HOURS PRN
Qty: 1 INHALER | Refills: 3 | Status: SHIPPED | OUTPATIENT
Start: 2018-11-26

## 2018-11-26 RX ORDER — AMLODIPINE BESYLATE 10 MG/1
10 TABLET ORAL DAILY
Qty: 90 TABLET | Refills: 3 | Status: SHIPPED | OUTPATIENT
Start: 2018-11-26 | End: 2019-12-02 | Stop reason: SDUPTHER

## 2018-11-26 NOTE — TELEPHONE ENCOUNTER
----- Message from Christy Bernal sent at 11/26/2018 10:41 AM CST -----  Contact: pt  Can the clinic reply in [facility]: no      Please refill the medication(s) listed below. The patient can be reached at this phone number [phone]) once it is called into the pharmacy.      Medication #1albuterol 90 mcg/actuation inhaler       Medication #2amLODIPine (NORVASC) 10 MG tablet - completely out    fluticasone-salmeterol 250-50 mcg/dose (ADVAIR DISKUS) 250-50 mcg/dose diskus inhaler       [facility]

## 2019-01-31 ENCOUNTER — EXTERNAL CHRONIC CARE MANAGEMENT (OUTPATIENT)
Dept: PRIMARY CARE CLINIC | Facility: CLINIC | Age: 79
End: 2019-01-31
Payer: MEDICARE

## 2019-01-31 PROCEDURE — 99490 CHRNC CARE MGMT STAFF 1ST 20: CPT | Mod: PBBFAC,PO | Performed by: FAMILY MEDICINE

## 2019-01-31 PROCEDURE — 99490 PR CHRONIC CARE MGMT, 1ST 20 MIN: ICD-10-PCS | Mod: S$PBB,,, | Performed by: FAMILY MEDICINE

## 2019-01-31 PROCEDURE — 99490 CHRNC CARE MGMT STAFF 1ST 20: CPT | Mod: S$PBB,,, | Performed by: FAMILY MEDICINE

## 2019-03-31 ENCOUNTER — EXTERNAL CHRONIC CARE MANAGEMENT (OUTPATIENT)
Dept: PRIMARY CARE CLINIC | Facility: CLINIC | Age: 79
End: 2019-03-31
Payer: MEDICARE

## 2019-03-31 PROCEDURE — 99490 CHRNC CARE MGMT STAFF 1ST 20: CPT | Mod: S$PBB,,, | Performed by: FAMILY MEDICINE

## 2019-03-31 PROCEDURE — 99490 PR CHRONIC CARE MGMT, 1ST 20 MIN: ICD-10-PCS | Mod: S$PBB,,, | Performed by: FAMILY MEDICINE

## 2019-03-31 PROCEDURE — 99490 CHRNC CARE MGMT STAFF 1ST 20: CPT | Mod: PBBFAC,PO | Performed by: FAMILY MEDICINE

## 2019-04-30 ENCOUNTER — EXTERNAL CHRONIC CARE MANAGEMENT (OUTPATIENT)
Dept: PRIMARY CARE CLINIC | Facility: CLINIC | Age: 79
End: 2019-04-30
Payer: MEDICARE

## 2019-04-30 PROCEDURE — 99490 PR CHRONIC CARE MGMT, 1ST 20 MIN: ICD-10-PCS | Mod: S$PBB,,, | Performed by: FAMILY MEDICINE

## 2019-04-30 PROCEDURE — 99490 CHRNC CARE MGMT STAFF 1ST 20: CPT | Mod: S$PBB,,, | Performed by: FAMILY MEDICINE

## 2019-04-30 PROCEDURE — 99490 CHRNC CARE MGMT STAFF 1ST 20: CPT | Mod: PBBFAC,PO | Performed by: FAMILY MEDICINE

## 2019-05-01 ENCOUNTER — OFFICE VISIT (OUTPATIENT)
Dept: FAMILY MEDICINE | Facility: CLINIC | Age: 79
End: 2019-05-01
Payer: MEDICARE

## 2019-05-01 ENCOUNTER — LAB VISIT (OUTPATIENT)
Dept: LAB | Facility: HOSPITAL | Age: 79
End: 2019-05-01
Attending: FAMILY MEDICINE
Payer: MEDICARE

## 2019-05-01 VITALS
TEMPERATURE: 98 F | HEIGHT: 70 IN | BODY MASS INDEX: 17.36 KG/M2 | WEIGHT: 121.25 LBS | SYSTOLIC BLOOD PRESSURE: 100 MMHG | OXYGEN SATURATION: 96 % | HEART RATE: 91 BPM | DIASTOLIC BLOOD PRESSURE: 60 MMHG

## 2019-05-01 DIAGNOSIS — R79.9 ABNORMAL FINDING OF BLOOD CHEMISTRY: ICD-10-CM

## 2019-05-01 DIAGNOSIS — I10 ESSENTIAL HYPERTENSION, BENIGN: ICD-10-CM

## 2019-05-01 DIAGNOSIS — Z72.0 TOBACCO ABUSE: ICD-10-CM

## 2019-05-01 DIAGNOSIS — I27.20 PULMONARY HYPERTENSION: ICD-10-CM

## 2019-05-01 DIAGNOSIS — J44.9 CHRONIC OBSTRUCTIVE PULMONARY DISEASE, UNSPECIFIED COPD TYPE: ICD-10-CM

## 2019-05-01 DIAGNOSIS — C32.1 SQUAMOUS CELL CANCER OF EPIGLOTTIS: ICD-10-CM

## 2019-05-01 DIAGNOSIS — I70.0 ATHEROSCLEROSIS OF AORTA: ICD-10-CM

## 2019-05-01 DIAGNOSIS — J96.10 CHRONIC RESPIRATORY FAILURE, UNSPECIFIED WHETHER WITH HYPOXIA OR HYPERCAPNIA: ICD-10-CM

## 2019-05-01 DIAGNOSIS — Z12.5 ENCOUNTER FOR SCREENING FOR MALIGNANT NEOPLASM OF PROSTATE: ICD-10-CM

## 2019-05-01 DIAGNOSIS — N39.0 URINARY TRACT INFECTION WITHOUT HEMATURIA, SITE UNSPECIFIED: Primary | ICD-10-CM

## 2019-05-01 DIAGNOSIS — E44.1 MILD MALNUTRITION: ICD-10-CM

## 2019-05-01 DIAGNOSIS — L72.3 SEBACEOUS CYST: ICD-10-CM

## 2019-05-01 PROBLEM — J96.21 ACUTE ON CHRONIC RESPIRATORY FAILURE WITH HYPOXIA AND HYPERCAPNIA: Status: RESOLVED | Noted: 2017-10-18 | Resolved: 2019-05-01

## 2019-05-01 PROBLEM — J44.1 CHRONIC OBSTRUCTIVE PULMONARY DISEASE WITH ACUTE EXACERBATION: Status: RESOLVED | Noted: 2017-10-18 | Resolved: 2019-05-01

## 2019-05-01 PROBLEM — J96.22 ACUTE ON CHRONIC RESPIRATORY FAILURE WITH HYPOXIA AND HYPERCAPNIA: Status: RESOLVED | Noted: 2017-10-18 | Resolved: 2019-05-01

## 2019-05-01 LAB
ALBUMIN SERPL BCP-MCNC: 3.5 G/DL (ref 3.5–5.2)
ALP SERPL-CCNC: 55 U/L (ref 55–135)
ALT SERPL W/O P-5'-P-CCNC: 17 U/L (ref 10–44)
ANION GAP SERPL CALC-SCNC: 8 MMOL/L (ref 8–16)
AST SERPL-CCNC: 23 U/L (ref 10–40)
BASOPHILS # BLD AUTO: 0.02 K/UL (ref 0–0.2)
BASOPHILS NFR BLD: 0.5 % (ref 0–1.9)
BILIRUB SERPL-MCNC: 0.6 MG/DL (ref 0.1–1)
BILIRUB SERPL-MCNC: NORMAL MG/DL
BLOOD URINE, POC: NEGATIVE
BUN SERPL-MCNC: 16 MG/DL (ref 8–23)
CALCIUM SERPL-MCNC: 9.7 MG/DL (ref 8.7–10.5)
CHLORIDE SERPL-SCNC: 100 MMOL/L (ref 95–110)
CHOLEST SERPL-MCNC: 222 MG/DL (ref 120–199)
CHOLEST/HDLC SERPL: 3.5 {RATIO} (ref 2–5)
CO2 SERPL-SCNC: 34 MMOL/L (ref 23–29)
COLOR, POC UA: YELLOW
COMPLEXED PSA SERPL-MCNC: 6.7 NG/ML (ref 0–4)
CREAT SERPL-MCNC: 0.9 MG/DL (ref 0.5–1.4)
DIFFERENTIAL METHOD: ABNORMAL
EOSINOPHIL # BLD AUTO: 0.2 K/UL (ref 0–0.5)
EOSINOPHIL NFR BLD: 3.8 % (ref 0–8)
ERYTHROCYTE [DISTWIDTH] IN BLOOD BY AUTOMATED COUNT: 16.5 % (ref 11.5–14.5)
EST. GFR  (AFRICAN AMERICAN): >60 ML/MIN/1.73 M^2
EST. GFR  (NON AFRICAN AMERICAN): >60 ML/MIN/1.73 M^2
ESTIMATED AVG GLUCOSE: 105 MG/DL (ref 68–131)
GLUCOSE SERPL-MCNC: 84 MG/DL (ref 70–110)
GLUCOSE UR QL STRIP: NORMAL
HBA1C MFR BLD HPLC: 5.3 % (ref 4–5.6)
HCT VFR BLD AUTO: 48.4 % (ref 40–54)
HDLC SERPL-MCNC: 63 MG/DL (ref 40–75)
HDLC SERPL: 28.4 % (ref 20–50)
HGB BLD-MCNC: 15 G/DL (ref 14–18)
IMM GRANULOCYTES # BLD AUTO: 0.01 K/UL (ref 0–0.04)
IMM GRANULOCYTES NFR BLD AUTO: 0.3 % (ref 0–0.5)
KETONES UR QL STRIP: NORMAL
LDLC SERPL CALC-MCNC: 141 MG/DL (ref 63–159)
LEUKOCYTE ESTERASE URINE, POC: NORMAL
LYMPHOCYTES # BLD AUTO: 0.9 K/UL (ref 1–4.8)
LYMPHOCYTES NFR BLD: 22.8 % (ref 18–48)
MCH RBC QN AUTO: 27.8 PG (ref 27–31)
MCHC RBC AUTO-ENTMCNC: 31 G/DL (ref 32–36)
MCV RBC AUTO: 90 FL (ref 82–98)
MONOCYTES # BLD AUTO: 0.6 K/UL (ref 0.3–1)
MONOCYTES NFR BLD: 14.9 % (ref 4–15)
NEUTROPHILS # BLD AUTO: 2.3 K/UL (ref 1.8–7.7)
NEUTROPHILS NFR BLD: 57.7 % (ref 38–73)
NITRITE, POC UA: NEGATIVE
NONHDLC SERPL-MCNC: 159 MG/DL
NRBC BLD-RTO: 0 /100 WBC
PH, POC UA: 8
PLATELET # BLD AUTO: 177 K/UL (ref 150–350)
PMV BLD AUTO: 10.5 FL (ref 9.2–12.9)
POTASSIUM SERPL-SCNC: 3.5 MMOL/L (ref 3.5–5.1)
PROT SERPL-MCNC: 7.6 G/DL (ref 6–8.4)
PROTEIN, POC: NORMAL
RBC # BLD AUTO: 5.39 M/UL (ref 4.6–6.2)
SODIUM SERPL-SCNC: 142 MMOL/L (ref 136–145)
SPECIFIC GRAVITY, POC UA: 1.01
TRIGL SERPL-MCNC: 90 MG/DL (ref 30–150)
TSH SERPL DL<=0.005 MIU/L-ACNC: 2.13 UIU/ML (ref 0.4–4)
UROBILINOGEN, POC UA: 1
WBC # BLD AUTO: 3.9 K/UL (ref 3.9–12.7)

## 2019-05-01 PROCEDURE — 99999 PR PBB SHADOW E&M-EST. PATIENT-LVL IV: ICD-10-PCS | Mod: PBBFAC,,, | Performed by: FAMILY MEDICINE

## 2019-05-01 PROCEDURE — 81001 URINALYSIS AUTO W/SCOPE: CPT | Mod: PBBFAC,PO | Performed by: FAMILY MEDICINE

## 2019-05-01 PROCEDURE — 99214 OFFICE O/P EST MOD 30 MIN: CPT | Mod: S$PBB,,, | Performed by: FAMILY MEDICINE

## 2019-05-01 PROCEDURE — 83036 HEMOGLOBIN GLYCOSYLATED A1C: CPT

## 2019-05-01 PROCEDURE — 99214 OFFICE O/P EST MOD 30 MIN: CPT | Mod: PBBFAC,PO | Performed by: FAMILY MEDICINE

## 2019-05-01 PROCEDURE — 84153 ASSAY OF PSA TOTAL: CPT

## 2019-05-01 PROCEDURE — 36415 COLL VENOUS BLD VENIPUNCTURE: CPT | Mod: PO

## 2019-05-01 PROCEDURE — 99999 PR PBB SHADOW E&M-EST. PATIENT-LVL IV: CPT | Mod: PBBFAC,,, | Performed by: FAMILY MEDICINE

## 2019-05-01 PROCEDURE — 84443 ASSAY THYROID STIM HORMONE: CPT

## 2019-05-01 PROCEDURE — 85025 COMPLETE CBC W/AUTO DIFF WBC: CPT

## 2019-05-01 PROCEDURE — 99214 PR OFFICE/OUTPT VISIT, EST, LEVL IV, 30-39 MIN: ICD-10-PCS | Mod: S$PBB,,, | Performed by: FAMILY MEDICINE

## 2019-05-01 PROCEDURE — 80053 COMPREHEN METABOLIC PANEL: CPT

## 2019-05-01 PROCEDURE — 80061 LIPID PANEL: CPT

## 2019-05-01 PROCEDURE — 87086 URINE CULTURE/COLONY COUNT: CPT

## 2019-05-01 RX ORDER — CIPROFLOXACIN 500 MG/1
500 TABLET ORAL 2 TIMES DAILY
Qty: 6 TABLET | Refills: 0 | Status: SHIPPED | OUTPATIENT
Start: 2019-05-01 | End: 2019-05-27

## 2019-05-03 LAB
BACTERIA UR CULT: NORMAL
BACTERIA UR CULT: NORMAL

## 2019-05-08 ENCOUNTER — CLINICAL SUPPORT (OUTPATIENT)
Dept: SMOKING CESSATION | Facility: CLINIC | Age: 79
End: 2019-05-08
Payer: COMMERCIAL

## 2019-05-08 DIAGNOSIS — F17.200 NICOTINE DEPENDENCE: Primary | ICD-10-CM

## 2019-05-08 PROCEDURE — 99999 PR PBB SHADOW E&M-EST. PATIENT-LVL I: ICD-10-PCS | Mod: PBBFAC,,,

## 2019-05-08 PROCEDURE — 99999 PR PBB SHADOW E&M-EST. PATIENT-LVL I: CPT | Mod: PBBFAC,,,

## 2019-05-08 PROCEDURE — 99404 PREV MED CNSL INDIV APPRX 60: CPT | Mod: S$GLB,,,

## 2019-05-08 PROCEDURE — 99404 PR PREVENT COUNSEL,INDIV,60 MIN: ICD-10-PCS | Mod: S$GLB,,,

## 2019-05-08 RX ORDER — IBUPROFEN 200 MG
1 TABLET ORAL DAILY
Qty: 14 PATCH | Refills: 0 | Status: SHIPPED | OUTPATIENT
Start: 2019-05-08 | End: 2019-06-08

## 2019-05-08 RX ORDER — DM/P-EPHED/ACETAMINOPH/DOXYLAM 30-7.5/3
2 LIQUID (ML) ORAL
Qty: 100 LOZENGE | Refills: 0 | Status: SHIPPED | OUTPATIENT
Start: 2019-05-08 | End: 2019-05-27

## 2019-05-08 NOTE — Clinical Note
Pt presents for intake smoking 10-15 cigarettes per day, after assessment and discussion recommend the 14mg nicotine patch in conjunction with the 2mg nicotine lozenge to be used in place of strong thoughts and urges to smoke, habit times, recommend an abrupt quit once he starts NRT, discussed strategies to help cut back and to help break the routine and habit associated with smoking, intake handout provided to the patient and discussed, all prescribed NRT discussed in depth as well as tobacco cessation medication s/e as well as usage discussed, contact card provided to the patient. Will continue to follow every two weeks while in the program.

## 2019-05-27 ENCOUNTER — INITIAL CONSULT (OUTPATIENT)
Dept: SURGERY | Facility: CLINIC | Age: 79
End: 2019-05-27
Payer: MEDICARE

## 2019-05-27 VITALS
HEART RATE: 105 BPM | WEIGHT: 121 LBS | DIASTOLIC BLOOD PRESSURE: 69 MMHG | BODY MASS INDEX: 17.32 KG/M2 | SYSTOLIC BLOOD PRESSURE: 104 MMHG | HEIGHT: 70 IN

## 2019-05-27 DIAGNOSIS — R22.2 MASS OF SUBCUTANEOUS TISSUE OF BACK: Primary | ICD-10-CM

## 2019-05-27 PROCEDURE — 99204 PR OFFICE/OUTPT VISIT, NEW, LEVL IV, 45-59 MIN: ICD-10-PCS | Mod: S$GLB,,, | Performed by: SURGERY

## 2019-05-27 PROCEDURE — 99204 OFFICE O/P NEW MOD 45 MIN: CPT | Mod: S$GLB,,, | Performed by: SURGERY

## 2019-05-27 RX ORDER — SODIUM CHLORIDE 9 MG/ML
INJECTION, SOLUTION INTRAVENOUS CONTINUOUS
Status: CANCELLED | OUTPATIENT
Start: 2019-05-27

## 2019-05-27 RX ORDER — LIDOCAINE HYDROCHLORIDE 10 MG/ML
1 INJECTION, SOLUTION EPIDURAL; INFILTRATION; INTRACAUDAL; PERINEURAL ONCE
Status: DISCONTINUED | OUTPATIENT
Start: 2019-05-27 | End: 2019-06-10 | Stop reason: HOSPADM

## 2019-05-27 NOTE — H&P (VIEW-ONLY)
History & Physical    SUBJECTIVE:     History of Present Illness:  Patient is a 78 y.o. male presents with back masses with minimal symptoms for months.     Chief Complaint   Patient presents with    Consult       Review of patient's allergies indicates:  No Known Allergies    Current Outpatient Medications   Medication Sig Dispense Refill    amLODIPine (NORVASC) 10 MG tablet Take 1 tablet (10 mg total) by mouth once daily. 90 tablet 3    multivitamin (THERAGRAN) per tablet Take 1 tablet by mouth once daily.      albuterol (PROVENTIL/VENTOLIN HFA) 90 mcg/actuation inhaler Inhale 1-2 puffs into the lungs every 6 (six) hours as needed for Wheezing. 1 Inhaler 3    albuterol-ipratropium 2.5mg-0.5mg/3mL (DUO-NEB) 0.5 mg-3 mg(2.5 mg base)/3 mL nebulizer solution Take 3 mLs by nebulization every 6 (six) hours as needed for Wheezing or Shortness of Breath. 100 vial 1    fluticasone-salmeterol 250-50 mcg/dose (ADVAIR DISKUS) 250-50 mcg/dose diskus inhaler Inhale 1 puff into the lungs 2 (two) times daily. 180 each 6    nicotine (NICODERM CQ) 14 mg/24 hr Place 1 patch onto the skin once daily. 14 patch 0     No current facility-administered medications for this visit.        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    Vocal cord polyps      Past Surgical History:   Procedure Laterality Date    BRONCHOSCOPY N/A 2/18/2016    [facility]    ESOPHAGOSCOPY N/A 3/19/2014    [facility] OR    EXCISION, LESION N/A 3/19/2014    [facility]    MICROLARYNGOSCOPY, WITH PROCEDURE USING LASER N/A 3/19/2014    [facility]    TONSILLECTOMY       Family History  "  Problem Relation Age of Onset    Heart disease Father     Hypertension Father     Hypertension Son     Heart disease Maternal Uncle     Hypertension Maternal Grandmother     Hypertension Maternal Grandfather     Kidney disease Mother      Social History     Tobacco Use    Smoking status: Current Some Day Smoker     Packs/day: 0.50     Years: 55.00     Pack years: 27.50    Smokeless tobacco: Never Used    Tobacco comment: has been weaning down   Substance Use Topics    Alcohol use: Yes     Alcohol/week: 3.6 oz     Types: 6 Cans of beer per week     Comment: 1-2  DRINKS A WEEK    Drug use: No        Review of Systems:  Review of Systems   Constitutional: Negative.    HENT: Negative.    Eyes: Negative.    Respiratory: Negative.    Cardiovascular: Negative.    Gastrointestinal: Negative.    Endocrine: Negative.    Musculoskeletal: Negative.    Skin: Negative.    Allergic/Immunologic: Negative.    Neurological: Negative.    Hematological: Negative.    Psychiatric/Behavioral: Negative.    All other systems reviewed and are negative.      OBJECTIVE:     Vital Signs (Most Recent)  Pulse: 105 (05/27/19 1446)  BP: 104/69 (05/27/19 1446)  5' 10" (1.778 m)  54.9 kg (121 lb)     Physical Exam:  Physical Exam   Constitutional: He is oriented to person, place, and time. He appears well-developed and well-nourished.   HENT:   Head: Normocephalic and atraumatic.   Right Ear: External ear normal.   Left Ear: External ear normal.   Nose: Nose normal.   Mouth/Throat: Oropharynx is clear and moist.   Eyes: Pupils are equal, round, and reactive to light. Conjunctivae and EOM are normal.   Neck: Normal range of motion. Neck supple.   Cardiovascular: Normal rate, regular rhythm, normal heart sounds and intact distal pulses.   Pulmonary/Chest: Effort normal and breath sounds normal.           Abdominal: Soft. Bowel sounds are normal.   Musculoskeletal: Normal range of motion.   Neurological: He is alert and oriented to person, " place, and time. He has normal reflexes.   Skin: Skin is warm and dry.   Psychiatric: He has a normal mood and affect. His behavior is normal. Thought content normal.   Vitals reviewed.      Laboratory  none    Diagnostic Results:  none    ASSESSMENT/PLAN:     Back masses X 2    PLAN:Plan     To the OR for excision of back masses with the risk and possible complications explained

## 2019-05-27 NOTE — LETTER
May 27, 2019      Lewis Self MD  [address]  [place] [address]           [facility]  [address] [facility] [address], [address]  [place] [address]  Phone: [phone]  Fax: [phone]          Patient: Enzo Wilson   MR Number: [id number]   YOB: 1940   Date of Visit: 5/27/2019       Dear Dr. Lewis Self:    Thank you for referring Enzo Wilson to me for evaluation. Attached you will find relevant portions of my assessment and plan of care.    If you have questions, please do not hesitate to call me. I look forward to following Enzo Wilson along with you.    Sincerely,    Canelo Lockwood MD    Enclosure  CC:  No Recipients    If you would like to receive this communication electronically, please contact [email or web address] or [phone] to request more information on [employer] access.    For providers and/or their staff who would like to refer a patient to [facility], please contact us through our one-stop-shop provider referral line, [facility], at [phone].    If you feel you have received this communication in error or would no longer like to receive these types of communications, please e-mail [email or web address]

## 2019-05-27 NOTE — PROGRESS NOTES
History & Physical    SUBJECTIVE:     History of Present Illness:  Patient is a 78 y.o. male presents with back masses with minimal symptoms for months.     Chief Complaint   Patient presents with    Consult       Review of patient's allergies indicates:  No Known Allergies    Current Outpatient Medications   Medication Sig Dispense Refill    amLODIPine (NORVASC) 10 MG tablet Take 1 tablet (10 mg total) by mouth once daily. 90 tablet 3    multivitamin (THERAGRAN) per tablet Take 1 tablet by mouth once daily.      albuterol (PROVENTIL/VENTOLIN HFA) 90 mcg/actuation inhaler Inhale 1-2 puffs into the lungs every 6 (six) hours as needed for Wheezing. 1 Inhaler 3    albuterol-ipratropium 2.5mg-0.5mg/3mL (DUO-NEB) 0.5 mg-3 mg(2.5 mg base)/3 mL nebulizer solution Take 3 mLs by nebulization every 6 (six) hours as needed for Wheezing or Shortness of Breath. 100 vial 1    fluticasone-salmeterol 250-50 mcg/dose (ADVAIR DISKUS) 250-50 mcg/dose diskus inhaler Inhale 1 puff into the lungs 2 (two) times daily. 180 each 6    nicotine (NICODERM CQ) 14 mg/24 hr Place 1 patch onto the skin once daily. 14 patch 0     No current facility-administered medications for this visit.        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    Vocal cord polyps      Past Surgical History:   Procedure Laterality Date    BRONCHOSCOPY N/A 2/18/2016    [facility] ENDO    ESOPHAGOSCOPY N/A 3/19/2014    [facility] OR    EXCISION, LESION N/A 3/19/2014    [facility] at [facility] OR    MICROLARYNGOSCOPY, WITH PROCEDURE USING LASER N/A 3/19/2014    [facility] OR    TONSILLECTOMY       Family History  "  Problem Relation Age of Onset    Heart disease Father     Hypertension Father     Hypertension Son     Heart disease Maternal Uncle     Hypertension Maternal Grandmother     Hypertension Maternal Grandfather     Kidney disease Mother      Social History     Tobacco Use    Smoking status: Current Some Day Smoker     Packs/day: 0.50     Years: 55.00     Pack years: 27.50    Smokeless tobacco: Never Used    Tobacco comment: has been weaning down   Substance Use Topics    Alcohol use: Yes     Alcohol/week: 3.6 oz     Types: 6 Cans of beer per week     Comment: 1-2  DRINKS A WEEK    Drug use: No        Review of Systems:  Review of Systems   Constitutional: Negative.    HENT: Negative.    Eyes: Negative.    Respiratory: Negative.    Cardiovascular: Negative.    Gastrointestinal: Negative.    Endocrine: Negative.    Musculoskeletal: Negative.    Skin: Negative.    Allergic/Immunologic: Negative.    Neurological: Negative.    Hematological: Negative.    Psychiatric/Behavioral: Negative.    All other systems reviewed and are negative.      OBJECTIVE:     Vital Signs (Most Recent)  Pulse: 105 (05/27/19 1446)  BP: 104/69 (05/27/19 1446)  5' 10" (1.778 m)  54.9 kg (121 lb)     Physical Exam:  Physical Exam   Constitutional: He is oriented to person, place, and time. He appears well-developed and well-nourished.   HENT:   Head: Normocephalic and atraumatic.   Right Ear: External ear normal.   Left Ear: External ear normal.   Nose: Nose normal.   Mouth/Throat: Oropharynx is clear and moist.   Eyes: Pupils are equal, round, and reactive to light. Conjunctivae and EOM are normal.   Neck: Normal range of motion. Neck supple.   Cardiovascular: Normal rate, regular rhythm, normal heart sounds and intact distal pulses.   Pulmonary/Chest: Effort normal and breath sounds normal.           Abdominal: Soft. Bowel sounds are normal.   Musculoskeletal: Normal range of motion.   Neurological: He is alert and oriented to person, " place, and time. He has normal reflexes.   Skin: Skin is warm and dry.   Psychiatric: He has a normal mood and affect. His behavior is normal. Thought content normal.   Vitals reviewed.      Laboratory  none    Diagnostic Results:  none    ASSESSMENT/PLAN:     Back masses X 2    PLAN:Plan     To the OR for excision of back masses with the risk and possible complications explained

## 2019-05-31 ENCOUNTER — EXTERNAL CHRONIC CARE MANAGEMENT (OUTPATIENT)
Dept: PRIMARY CARE CLINIC | Facility: CLINIC | Age: 79
End: 2019-05-31
Payer: MEDICARE

## 2019-05-31 PROCEDURE — 99490 PR CHRONIC CARE MGMT, 1ST 20 MIN: ICD-10-PCS | Mod: S$PBB,,, | Performed by: FAMILY MEDICINE

## 2019-05-31 PROCEDURE — 99490 CHRNC CARE MGMT STAFF 1ST 20: CPT | Mod: PBBFAC,PO | Performed by: FAMILY MEDICINE

## 2019-05-31 PROCEDURE — 99490 CHRNC CARE MGMT STAFF 1ST 20: CPT | Mod: S$PBB,,, | Performed by: FAMILY MEDICINE

## 2019-06-05 ENCOUNTER — HOSPITAL ENCOUNTER (OUTPATIENT)
Dept: PREADMISSION TESTING | Facility: HOSPITAL | Age: 79
Discharge: HOME | End: 2019-06-05
Attending: SURGERY
Payer: MEDICARE

## 2019-06-05 VITALS
RESPIRATION RATE: 18 BRPM | HEIGHT: 70 IN | DIASTOLIC BLOOD PRESSURE: 73 MMHG | WEIGHT: 116.19 LBS | HEART RATE: 100 BPM | OXYGEN SATURATION: 94 % | TEMPERATURE: 97 F | BODY MASS INDEX: 16.63 KG/M2 | SYSTOLIC BLOOD PRESSURE: 112 MMHG

## 2019-06-05 DIAGNOSIS — Z01.818 PREOP TESTING: Primary | ICD-10-CM

## 2019-06-05 LAB
ANION GAP SERPL CALC-SCNC: 5 MMOL/L (ref 8–16)
BASOPHILS # BLD AUTO: 0.01 K/UL (ref 0–0.2)
BASOPHILS NFR BLD: 0.3 % (ref 0–1.9)
BUN SERPL-MCNC: 15 MG/DL (ref 8–23)
CALCIUM SERPL-MCNC: 9.6 MG/DL (ref 8.7–10.5)
CHLORIDE SERPL-SCNC: 98 MMOL/L (ref 95–110)
CO2 SERPL-SCNC: 38 MMOL/L (ref 23–29)
CREAT SERPL-MCNC: 1 MG/DL (ref 0.5–1.4)
DIFFERENTIAL METHOD: ABNORMAL
EOSINOPHIL # BLD AUTO: 0.1 K/UL (ref 0–0.5)
EOSINOPHIL NFR BLD: 1.7 % (ref 0–8)
ERYTHROCYTE [DISTWIDTH] IN BLOOD BY AUTOMATED COUNT: 15.8 % (ref 11.5–14.5)
EST. GFR  (AFRICAN AMERICAN): >60 ML/MIN/1.73 M^2
EST. GFR  (NON AFRICAN AMERICAN): >60 ML/MIN/1.73 M^2
GLUCOSE SERPL-MCNC: 98 MG/DL (ref 70–110)
HCT VFR BLD AUTO: 47.5 % (ref 40–54)
HGB BLD-MCNC: 15 G/DL (ref 14–18)
LYMPHOCYTES # BLD AUTO: 0.6 K/UL (ref 1–4.8)
LYMPHOCYTES NFR BLD: 21.8 % (ref 18–48)
MCH RBC QN AUTO: 28.4 PG (ref 27–31)
MCHC RBC AUTO-ENTMCNC: 31.6 G/DL (ref 32–36)
MCV RBC AUTO: 90 FL (ref 82–98)
MONOCYTES # BLD AUTO: 0.5 K/UL (ref 0.3–1)
MONOCYTES NFR BLD: 18.1 % (ref 4–15)
NEUTROPHILS # BLD AUTO: 1.7 K/UL (ref 1.8–7.7)
NEUTROPHILS NFR BLD: 58.1 % (ref 38–73)
PLATELET # BLD AUTO: 131 K/UL (ref 150–350)
PMV BLD AUTO: 9 FL (ref 9.2–12.9)
POTASSIUM SERPL-SCNC: 3.9 MMOL/L (ref 3.5–5.1)
RBC # BLD AUTO: 5.28 M/UL (ref 4.6–6.2)
SODIUM SERPL-SCNC: 141 MMOL/L (ref 136–145)
WBC # BLD AUTO: 2.93 K/UL (ref 3.9–12.7)

## 2019-06-05 PROCEDURE — 93010 EKG 12-LEAD: ICD-10-PCS | Mod: ,,, | Performed by: INTERNAL MEDICINE

## 2019-06-05 PROCEDURE — 36415 COLL VENOUS BLD VENIPUNCTURE: CPT

## 2019-06-05 PROCEDURE — 80048 BASIC METABOLIC PNL TOTAL CA: CPT

## 2019-06-05 PROCEDURE — 93010 ELECTROCARDIOGRAM REPORT: CPT | Mod: ,,, | Performed by: INTERNAL MEDICINE

## 2019-06-05 PROCEDURE — 93005 ELECTROCARDIOGRAM TRACING: CPT

## 2019-06-05 PROCEDURE — 85025 COMPLETE CBC W/AUTO DIFF WBC: CPT

## 2019-06-05 NOTE — DISCHARGE INSTRUCTIONS
"Your procedure  is scheduled for _6/10/_________.    Call [phone] between 2pm and 5pm on _______to find out your arrival time for the day of surgery.    Report to Same Day Surgery Unit at ____ AM on the 2nd floor of the hospital.  Use the front entrance of the hospital.  The front doors of the hospital open promptly at 5:30am.  If you need wheelchair assistance, call [phone] from your cell phone, or call "0" from the courtesy phone in the lobby.    Important instructions:   Do not eat or drink after 12 midnight, including water.  It is okay to brush your teeth.  Do not have gum, candy or mints.     Take only these medications with a small swallow of water on the morning of your surgery __amlodipine, Advair____________      Stop taking Aspirin, Ibuprofen, Motrin and Aleve , Fish oil, and Vitamin E for at least 7 days before your surgery. You may use Tylenol unless otherwise instructed by your doctor.         Please shower the night before and the morning of your surgery.        Use Hibiclens soap as instructed by your pre op nurse.   Please place clean linens on your bed the night before surgery. Please wear fresh clean clothing after each shower.     No shaving of procedural area at least 4-5 days before surgery due to increased risk of skin irritation and/or possible infection.     You may wear deodorant only.      Do not wear powder, body lotion or perfume/cologne.     Do not wear any jewelry or have any metal on your body.     You will be asked to remove any dentures or partials for the procedure.     Please bring any documents given to you by your doctor.     If you are going home on the same day of surgery, you must arrange for a family member or a friend to drive you home.  Public transportation is prohibited.  You will not be able to drive home if you were given anesthesia or sedation.     Wear loose fitting clothes allowing for bandages.     Please leave money and valuables home.       You may " bring your cell phone.     Call the doctor if fever or illness should occur before your surgery.    Call [phone] to contact us here if needed.

## 2019-06-09 ENCOUNTER — ANESTHESIA EVENT (OUTPATIENT)
Dept: SURGERY | Facility: HOSPITAL | Age: 79
End: 2019-06-09
Payer: MEDICARE

## 2019-06-10 ENCOUNTER — ANESTHESIA (OUTPATIENT)
Dept: SURGERY | Facility: HOSPITAL | Age: 79
End: 2019-06-10
Payer: MEDICARE

## 2019-06-10 ENCOUNTER — HOSPITAL ENCOUNTER (OUTPATIENT)
Facility: HOSPITAL | Age: 79
Discharge: HOME | End: 2019-06-10
Attending: SURGERY | Admitting: SURGERY
Payer: MEDICARE

## 2019-06-10 VITALS
BODY MASS INDEX: 16.63 KG/M2 | OXYGEN SATURATION: 98 % | SYSTOLIC BLOOD PRESSURE: 111 MMHG | HEIGHT: 70 IN | HEART RATE: 77 BPM | TEMPERATURE: 97 F | DIASTOLIC BLOOD PRESSURE: 68 MMHG | RESPIRATION RATE: 18 BRPM | WEIGHT: 116.19 LBS

## 2019-06-10 DIAGNOSIS — R22.2 MASS OF SUBCUTANEOUS TISSUE OF BACK: Primary | ICD-10-CM

## 2019-06-10 LAB
POCT GLUCOSE: 89 MG/DL (ref 70–110)
POCT GLUCOSE: 89 MG/DL (ref 70–110)

## 2019-06-10 PROCEDURE — 63600175 PHARM REV CODE 636 W HCPCS: Performed by: NURSE ANESTHETIST, CERTIFIED REGISTERED

## 2019-06-10 PROCEDURE — 11403 PR EXC SKIN BENIG 2.1-3 CM TRUNK,ARM,LEG: ICD-10-PCS | Mod: 51,,, | Performed by: SURGERY

## 2019-06-10 PROCEDURE — 25000003 PHARM REV CODE 250: Performed by: SURGERY

## 2019-06-10 PROCEDURE — D9220A PRA ANESTHESIA: ICD-10-PCS | Mod: CRNA,,, | Performed by: NURSE ANESTHETIST, CERTIFIED REGISTERED

## 2019-06-10 PROCEDURE — 88305 TISSUE EXAM BY PATHOLOGIST: CPT | Mod: 26,,, | Performed by: PATHOLOGY

## 2019-06-10 PROCEDURE — D9220A PRA ANESTHESIA: ICD-10-PCS | Mod: ANES,,, | Performed by: ANESTHESIOLOGY

## 2019-06-10 PROCEDURE — 71000015 HC POSTOP RECOV 1ST HR: Performed by: SURGERY

## 2019-06-10 PROCEDURE — D9220A PRA ANESTHESIA: Mod: ANES,,, | Performed by: ANESTHESIOLOGY

## 2019-06-10 PROCEDURE — 63600175 PHARM REV CODE 636 W HCPCS: Performed by: SURGERY

## 2019-06-10 PROCEDURE — 71000033 HC RECOVERY, INTIAL HOUR: Performed by: SURGERY

## 2019-06-10 PROCEDURE — 37000008 HC ANESTHESIA 1ST 15 MINUTES: Performed by: SURGERY

## 2019-06-10 PROCEDURE — 36000707: Performed by: SURGERY

## 2019-06-10 PROCEDURE — 71000016 HC POSTOP RECOV ADDL HR: Performed by: SURGERY

## 2019-06-10 PROCEDURE — 27000221 HC OXYGEN, UP TO 24 HOURS

## 2019-06-10 PROCEDURE — 82962 GLUCOSE BLOOD TEST: CPT | Performed by: SURGERY

## 2019-06-10 PROCEDURE — D9220A PRA ANESTHESIA: Mod: CRNA,,, | Performed by: NURSE ANESTHETIST, CERTIFIED REGISTERED

## 2019-06-10 PROCEDURE — 12032 INTMD RPR S/A/T/EXT 2.6-7.5: CPT | Mod: ,,, | Performed by: SURGERY

## 2019-06-10 PROCEDURE — 25000242 PHARM REV CODE 250 ALT 637 W/ HCPCS: Performed by: ANESTHESIOLOGY

## 2019-06-10 PROCEDURE — 25000003 PHARM REV CODE 250: Performed by: ANESTHESIOLOGY

## 2019-06-10 PROCEDURE — 88305 TISSUE EXAM BY PATHOLOGIST: CPT | Performed by: PATHOLOGY

## 2019-06-10 PROCEDURE — 12032 PR LAYR CLOS WND TRUNK,ARM,LEG 2.6-7.5 CM: ICD-10-PCS | Mod: ,,, | Performed by: SURGERY

## 2019-06-10 PROCEDURE — 94761 N-INVAS EAR/PLS OXIMETRY MLT: CPT

## 2019-06-10 PROCEDURE — 27201423 OPTIME MED/SURG SUP & DEVICES STERILE SUPPLY: Performed by: SURGERY

## 2019-06-10 PROCEDURE — 37000009 HC ANESTHESIA EA ADD 15 MINS: Performed by: SURGERY

## 2019-06-10 PROCEDURE — 11403 EXC TR-EXT B9+MARG 2.1-3CM: CPT | Mod: 51,,, | Performed by: SURGERY

## 2019-06-10 PROCEDURE — 94640 AIRWAY INHALATION TREATMENT: CPT

## 2019-06-10 PROCEDURE — 36000706: Performed by: SURGERY

## 2019-06-10 PROCEDURE — 88305 TISSUE SPECIMEN TO PATHOLOGY - SURGERY: ICD-10-PCS | Mod: 26,,, | Performed by: PATHOLOGY

## 2019-06-10 RX ORDER — HYDROMORPHONE HYDROCHLORIDE 2 MG/ML
0.2 INJECTION, SOLUTION INTRAMUSCULAR; INTRAVENOUS; SUBCUTANEOUS EVERY 5 MIN PRN
Status: DISCONTINUED | OUTPATIENT
Start: 2019-06-10 | End: 2019-06-10 | Stop reason: HOSPADM

## 2019-06-10 RX ORDER — SODIUM CHLORIDE 0.9 % (FLUSH) 0.9 %
3 SYRINGE (ML) INJECTION
Status: DISCONTINUED | OUTPATIENT
Start: 2019-06-10 | End: 2019-06-10 | Stop reason: HOSPADM

## 2019-06-10 RX ORDER — BUPIVACAINE HYDROCHLORIDE 2.5 MG/ML
INJECTION, SOLUTION EPIDURAL; INFILTRATION; INTRACAUDAL
Status: DISCONTINUED | OUTPATIENT
Start: 2019-06-10 | End: 2019-06-10 | Stop reason: HOSPADM

## 2019-06-10 RX ORDER — ACETAMINOPHEN 10 MG/ML
1000 INJECTION, SOLUTION INTRAVENOUS ONCE
Status: COMPLETED | OUTPATIENT
Start: 2019-06-10 | End: 2019-06-10

## 2019-06-10 RX ORDER — SODIUM CHLORIDE 9 MG/ML
INJECTION, SOLUTION INTRAVENOUS CONTINUOUS
Status: DISCONTINUED | OUTPATIENT
Start: 2019-06-10 | End: 2019-06-10 | Stop reason: HOSPADM

## 2019-06-10 RX ORDER — LIDOCAINE HYDROCHLORIDE 10 MG/ML
1 INJECTION, SOLUTION EPIDURAL; INFILTRATION; INTRACAUDAL; PERINEURAL ONCE
Status: DISCONTINUED | OUTPATIENT
Start: 2019-06-10 | End: 2019-06-10 | Stop reason: HOSPADM

## 2019-06-10 RX ORDER — MORPHINE SULFATE 10 MG/ML
3 INJECTION INTRAMUSCULAR; INTRAVENOUS; SUBCUTANEOUS
Status: DISCONTINUED | OUTPATIENT
Start: 2019-06-10 | End: 2019-06-10 | Stop reason: HOSPADM

## 2019-06-10 RX ORDER — IPRATROPIUM BROMIDE AND ALBUTEROL SULFATE 2.5; .5 MG/3ML; MG/3ML
3 SOLUTION RESPIRATORY (INHALATION) ONCE
Status: COMPLETED | OUTPATIENT
Start: 2019-06-10 | End: 2019-06-10

## 2019-06-10 RX ORDER — FENTANYL CITRATE 50 UG/ML
INJECTION, SOLUTION INTRAMUSCULAR; INTRAVENOUS
Status: DISCONTINUED | OUTPATIENT
Start: 2019-06-10 | End: 2019-06-10

## 2019-06-10 RX ORDER — LIDOCAINE HCL/PF 100 MG/5ML
SYRINGE (ML) INTRAVENOUS
Status: DISCONTINUED | OUTPATIENT
Start: 2019-06-10 | End: 2019-06-10

## 2019-06-10 RX ORDER — PROPOFOL 10 MG/ML
VIAL (ML) INTRAVENOUS
Status: DISCONTINUED | OUTPATIENT
Start: 2019-06-10 | End: 2019-06-10

## 2019-06-10 RX ORDER — LIDOCAINE HYDROCHLORIDE 10 MG/ML
INJECTION, SOLUTION EPIDURAL; INFILTRATION; INTRACAUDAL; PERINEURAL
Status: DISCONTINUED | OUTPATIENT
Start: 2019-06-10 | End: 2019-06-10 | Stop reason: HOSPADM

## 2019-06-10 RX ORDER — CEFAZOLIN SODIUM 2 G/50ML
2 SOLUTION INTRAVENOUS
Status: COMPLETED | OUTPATIENT
Start: 2019-06-10 | End: 2019-06-10

## 2019-06-10 RX ORDER — OXYCODONE AND ACETAMINOPHEN 5; 325 MG/1; MG/1
1 TABLET ORAL EVERY 4 HOURS PRN
Qty: 30 TABLET | Refills: 0 | Status: SHIPPED | OUTPATIENT
Start: 2019-06-10

## 2019-06-10 RX ORDER — FENTANYL CITRATE 50 UG/ML
25 INJECTION, SOLUTION INTRAMUSCULAR; INTRAVENOUS EVERY 5 MIN PRN
Status: DISCONTINUED | OUTPATIENT
Start: 2019-06-10 | End: 2019-06-10 | Stop reason: HOSPADM

## 2019-06-10 RX ORDER — SODIUM CHLORIDE, SODIUM LACTATE, POTASSIUM CHLORIDE, CALCIUM CHLORIDE 600; 310; 30; 20 MG/100ML; MG/100ML; MG/100ML; MG/100ML
INJECTION, SOLUTION INTRAVENOUS CONTINUOUS
Status: DISCONTINUED | OUTPATIENT
Start: 2019-06-10 | End: 2019-06-10 | Stop reason: HOSPADM

## 2019-06-10 RX ADMIN — PROPOFOL 20 MG: 10 INJECTION, EMULSION INTRAVENOUS at 08:06

## 2019-06-10 RX ADMIN — CEFAZOLIN SODIUM 2 G: 2 SOLUTION INTRAVENOUS at 08:06

## 2019-06-10 RX ADMIN — IPRATROPIUM BROMIDE AND ALBUTEROL SULFATE 3 ML: .5; 3 SOLUTION RESPIRATORY (INHALATION) at 07:06

## 2019-06-10 RX ADMIN — LIDOCAINE HYDROCHLORIDE 50 MG: 20 INJECTION, SOLUTION INTRAVENOUS at 08:06

## 2019-06-10 RX ADMIN — SODIUM CHLORIDE, SODIUM LACTATE, POTASSIUM CHLORIDE, AND CALCIUM CHLORIDE: .6; .31; .03; .02 INJECTION, SOLUTION INTRAVENOUS at 08:06

## 2019-06-10 RX ADMIN — PROPOFOL 40 MG: 10 INJECTION, EMULSION INTRAVENOUS at 08:06

## 2019-06-10 RX ADMIN — PROPOFOL 20 MG: 10 INJECTION, EMULSION INTRAVENOUS at 09:06

## 2019-06-10 RX ADMIN — ACETAMINOPHEN 1000 MG: 10 INJECTION, SOLUTION INTRAVENOUS at 10:06

## 2019-06-10 RX ADMIN — FENTANYL CITRATE 50 MCG: 50 INJECTION INTRAMUSCULAR; INTRAVENOUS at 08:06

## 2019-06-10 RX ADMIN — PROPOFOL 60 MG: 10 INJECTION, EMULSION INTRAVENOUS at 08:06

## 2019-06-10 NOTE — INTERVAL H&P NOTE
The patient has been examined and the H&P has been reviewed:    I concur with the findings and no changes have occurred since H&P was written.    Anesthesia/Surgery risks, benefits and alternative options discussed and understood by patient/family.          Active Hospital Problems    Diagnosis  POA    Mass of subcutaneous tissue of back [R22.2]  Yes      Resolved Hospital Problems   No resolved problems to display.

## 2019-06-10 NOTE — OP NOTE
[facility]  General Surgery  Operative Note    SUMMARY     Date of Procedure: 6/10/2019     Procedure: Procedure(s) (LRB):  EXCISION, MASS, BACK (N/A)       Surgeon(s) and Role:     * Canelo Lockwood MD - Primary     * Gabriela Hoffman MD - Resident - Assisting        Pre-Operative Diagnosis: Mass of subcutaneous tissue of back [R22.2]    Post-Operative Diagnosis: Post-Op Diagnosis Codes:     * Mass of subcutaneous tissue of back [R22.2]    Anesthesia: Local MAC    Technical Procedures Used:  Patient was taken to the operating room placed on operating room table in right lateral decubitus position. Under adequate IV sedation prepped and draped around his back in usual sterile fashion. Two lesions were identified 1 upper right back 1 lower after local injection port was unremarkable 1% lidocaine 50 50 solution ellipses of skin were taken around the cyst.  Circumferential dissection was done all the way down to the fascia was excised and pathology for analysis.  The upper back lesion measured approximately 3 cm and the lower back lesion measured 5 cm of were excised hemostasis was obtained with electrocautery on both both closed in layers with absorbable suture deep and absorbable suture with interrupted lower arms superficially.  Bandages were applied the patient awakened and transported to recovery in satisfactory condition    Description of the Findings of the Procedure:  3 cm upper back mass 2.5 cm lower back mass    Significant Surgical Tasks Conducted by the Assistant(s), if Applicable:  Greater than 50%    Complications: No    Estimated Blood Loss (EBL):  None           Implants: * No implants in log *    Specimens:   Specimen (12h ago, onward)    None                  Condition: Good    Disposition: PACU - hemodynamically stable.    Attestation: I was present and scrubbed for the entire procedure.

## 2019-06-10 NOTE — TRANSFER OF CARE
"Anesthesia Transfer of Care Note    Patient: Enzo Wilson    Procedure(s) Performed: Procedure(s) (LRB):  EXCISION, MASS, BACK (N/A)    Patient location: PACU    Anesthesia Type: MAC    Transport from OR: Transported from OR on 2-3 L/min O2 by NC with adequate spontaneous ventilation    Post pain: adequate analgesia    Post assessment: no apparent anesthetic complications    Post vital signs: stable    Level of consciousness: awake    Nausea/Vomiting: no nausea/vomiting    Complications: none    Transfer of care protocol was followed      Last vitals:   Visit Vitals  /75 (BP Location: Right arm, Patient Position: Lying)   Pulse 78   Temp 35.9 °C (96.6 °F) (Axillary)   Resp 14   Ht 5' 10" (1.778 m)   Wt 52.7 kg (116 lb 2.9 oz)   SpO2 98%   BMI 16.67 kg/m²     "

## 2019-06-10 NOTE — DISCHARGE INSTRUCTIONS
Dr. Lockwood   Office # [phone]     Discharge Instructions for Same Day Surgery     Call the office for and appointment if one has not already been made.     Diet: Drink plenty of fluids the first 48 hours and you may resume your   usual diet.     Activity: No heavy lifting (over 10 pounds), pushing or pulling until your   post op visit. Your doctor's office may have told you to limit your lifting to less weight, or even no weight.  Be sure to follow those instructions.    Note: You may ride in a car and you may drive when comfortable.     Do not drive, drink alcohol, or sign legal documents for 24 hours, or if taking narcotic pain medication.    Dressings: Remove the dressing 24 hours after surgery. You may shower  24 hours after surgery    If you have steri strips ( appears to be strips of white tape) on   your incision, leave them on.     Medical: Call the doctor for any of the following problems: fever above 101,   severe pain, bleeding, or abdominal distention (swelling).   If constipated you may take any stool softener you choose.     Occasionally small areas of skin numbness or an unpleasant skin sensation can result. Also, you may find that your incision is swollen and tender for a few days.  Some redness around sutures and staples is a normal reaction, but if the discomfort persists or worsens, call you doctor.   Fall Prevention  Millions of people fall every year and injure themselves. You may have had anesthesia or sedation which may increase your risk of falling. You may have health issues that put you at an increased risk of falling.     Here are ways to reduce your risk of falling.  ·   · Make your home safe by keeping walkways clear of objects you may trip over.  · Use non-slip pads under rugs. Do not use area rugs or small throw rugs.  · Use non-slip mats in bathtubs and showers.  · Install handrails and lights on staircases.  · Do not walk in poorly lit areas.  · Do not stand on chairs or wobbly  ladders.  · Use caution when reaching overhead or looking upward. This position can cause a loss of balance.  · Be sure your shoes fit properly, have non-slip bottoms and are in good condition.   · Wear shoes both inside and out. Avoid going barefoot or wearing slippers.  · Be cautious when going up and down stairs, curbs, and when walking on uneven sidewalks.  · If your balance is poor, consider using a cane or walker.  · If your fall was related to alcohol use, stop or limit alcohol intake.   · If your fall was related to use of sleeping medicines, talk to your doctor about this. You may need to reduce your dosage at bedtime if you awaken during the night to go to the bathroom.    · To reduce the need for nighttime bathroom trips:  ¨ Avoid drinking fluids for several hours before going to bed  ¨ Empty your bladder before going to bed  ¨ Men can keep a urinal at the bedside  · Stay as active as you can. Balance, flexibility, strength, and endurance all come from exercise. They all play a role in preventing falls. Ask your healthcare provider which types of activity are right for you.  · Get your vision checked on a regular basis.  · If you have pets, know where they are before you stand up or walk so you don't trip over them.  Use night lights.

## 2019-06-10 NOTE — BRIEF OP NOTE
[facility]  Brief Operative Note     SUMMARY     Surgery Date: 6/10/2019     Surgeon(s) and Role:     * Canelo Lockwood MD - Primary     * Gabriela Hoffman MD - Resident - Assisting        Pre-op Diagnosis:  Mass of subcutaneous tissue of back [R22.2]    Post-op Diagnosis:  Post-Op Diagnosis Codes:     * Mass of subcutaneous tissue of back [R22.2]    Procedure(s) (LRB):  EXCISION, MASS, BACK (N/A)    Anesthesia: Local MAC    Description of the findings of the procedure: upper back mass 3 cm and lower back mass is 2.5 cm    Findings/Key Components: same epidermal inclusion cysts    Estimated Blood Loss: minimal         Specimens:   Specimen (12h ago, onward)    None          Discharge Note    SUMMARY     Admit Date: 6/10/2019    Discharge Date and Time:  06/10/2019 9:14 AM    Hospital Course (synopsis of major diagnoses, care, treatment, and services provided during the course of the hospital stay): uneventful post op course     Final Diagnosis: Post-Op Diagnosis Codes:     * Mass of subcutaneous tissue of back [R22.2]    Disposition: Home or Self Care    Follow Up/Patient Instructions:     Medications:  Reconciled Home Medications:      Medication List      START taking these medications    oxyCODONE-acetaminophen 5-325 mg per tablet  Commonly known as:  PERCOCET  Take 1 tablet by mouth every 4 (four) hours as needed for Pain.        CONTINUE taking these medications    albuterol 90 mcg/actuation inhaler  Commonly known as:  PROVENTIL/VENTOLIN HFA  Inhale 1-2 puffs into the lungs every 6 (six) hours as needed for Wheezing.     albuterol-ipratropium 2.5 mg-0.5 mg/3 mL nebulizer solution  Commonly known as:  DUO-NEB  Take 3 mLs by nebulization every 6 (six) hours as needed for Wheezing or Shortness of Breath.     amLODIPine 10 MG tablet  Commonly known as:  NORVASC  Take 1 tablet (10 mg total) by mouth once daily.     fluticasone-salmeterol 250-50 mcg/dose 250-50 mcg/dose diskus inhaler  Commonly  known as:  ADVAIR DISKUS  Inhale 1 puff into the lungs 2 (two) times daily.          Discharge Procedure Orders   Diet general     Call MD for:  temperature >100.4     Call MD for:  persistent nausea and vomiting     Call MD for:  severe uncontrolled pain     Call MD for:  difficulty breathing, headache or visual disturbances     Call MD for:  redness, tenderness, or signs of infection (pain, swelling, redness, odor or green/yellow discharge around incision site)     Call MD for:  hives     Remove dressing in 24 hours     Shower on day dressing removed (No bath)     Follow-up Information     Canelo Lockwood MD In 1 week.    Specialties:  General Surgery, Oncology  Contact information:  [address]  [address] [place] [id number] [phone]

## 2019-06-11 NOTE — ANESTHESIA POSTPROCEDURE EVALUATION
Anesthesia Post Evaluation    Patient: Enzo Wilson    Procedure(s) Performed: Procedure(s) (LRB):  EXCISION, MASS, BACK (N/A)    Final Anesthesia Type: MAC  Patient location during evaluation: PACU  Patient participation: Yes- Able to Participate  Level of consciousness: awake and alert  Post-procedure vital signs: reviewed and stable  Pain management: adequate  Airway patency: patent  PONV status at discharge: No PONV  Anesthetic complications: no      Cardiovascular status: blood pressure returned to baseline and hemodynamically stable  Respiratory status: unassisted and spontaneous ventilation  Hydration status: euvolemic  Follow-up not needed.          Vitals Value Taken Time   /68 6/10/2019 11:45 AM   Temp 36.1 °C (97 °F) 6/10/2019 11:45 AM   Pulse 77 6/10/2019 11:45 AM   Resp 18 6/10/2019 11:45 AM   SpO2 98 % 6/10/2019 11:45 AM         Event Time     Out of Recovery 10:13:33          Pain/Mitchell Score: Pain Rating Prior to Med Admin: 0 (6/10/2019 10:05 AM)  Mitchell Score: 10 (6/10/2019 12:00 PM)

## 2019-06-11 NOTE — ANESTHESIA PREPROCEDURE EVALUATION
06/11/2019  Enzo Wilson is a 78 y.o., male.    Anesthesia Evaluation     I have reviewed the Nursing Notes.      Review of Systems  Anesthesia Hx:  No problems with previous Anesthesia   Social:  Smoker    Cardiovascular:   Denies Pacemaker. Hypertension  Denies CABG/stent.  Denies Dysrhythmias.   Denies Angina. CHF         denies PVD no hyperlipidemia ECG has been reviewed. Pulm HTN   Pulmonary:   Denies Pneumonia COPD (4L NC), severe Denies Asthma.  Denies Recent URI.  Denies Sleep Apnea.    Renal/:  Renal/ Normal     Hepatic/GI:  Hepatic/GI Normal    Neurological:  Neurology Normal    Endocrine:  Endocrine Normal        Physical Exam  General:  Well nourished    Airway/Jaw/Neck:  AIRWAY FINDINGS: Normal      Chest/Lungs:  Chest/Lungs Clear    Heart/Vascular:  Heart Findings: Normal       Mental Status:  Mental Status Findings: Normal        Anesthesia Plan  Type of Anesthesia, risks & benefits discussed:  Anesthesia Type:  MAC  Patient's Preference:   Intra-op Monitoring Plan: standard ASA monitors  Intra-op Monitoring Plan Comments:   Post Op Pain Control Plan:   Post Op Pain Control Plan Comments:   Induction:   IV  Beta Blocker:  Patient is not currently on a Beta-Blocker (No further documentation required).       Informed Consent: Patient understands risks and agrees with Anesthesia plan.  Questions answered. Anesthesia consent signed with patient.  ASA Score: 3     Day of Surgery Review of History & Physical:  There are no significant changes.  H&P update referred to the provider.         Ready For Surgery From Anesthesia Perspective.

## 2019-06-17 ENCOUNTER — OFFICE VISIT (OUTPATIENT)
Dept: SURGERY | Facility: CLINIC | Age: 79
End: 2019-06-17
Payer: MEDICARE

## 2019-06-17 VITALS
HEIGHT: 70 IN | SYSTOLIC BLOOD PRESSURE: 98 MMHG | DIASTOLIC BLOOD PRESSURE: 65 MMHG | BODY MASS INDEX: 16.61 KG/M2 | HEART RATE: 110 BPM | WEIGHT: 116 LBS

## 2019-06-17 DIAGNOSIS — L72.0 EPIDERMAL INCLUSION CYST: Primary | ICD-10-CM

## 2019-06-17 PROCEDURE — 99024 POSTOP FOLLOW-UP VISIT: CPT | Mod: S$GLB,POP,, | Performed by: SURGERY

## 2019-06-17 PROCEDURE — 99024 PR POST-OP FOLLOW-UP VISIT: ICD-10-PCS | Mod: S$GLB,POP,, | Performed by: SURGERY

## 2019-06-17 NOTE — PROGRESS NOTES
Subjective:       Patient ID: Enzo Wilson is a 79 y.o. male.    Chief Complaint: Post-op Evaluation    HPI 80 yo male with excision of EICs without complaints  Review of Systems   Constitutional: Negative.    HENT: Negative.    Eyes: Negative.    Respiratory: Negative.    Cardiovascular: Negative.    Gastrointestinal: Negative.    Endocrine: Negative.    Musculoskeletal: Negative.    Skin: Negative.    Allergic/Immunologic: Negative.    Neurological: Negative.    Hematological: Negative.    Psychiatric/Behavioral: Negative.    All other systems reviewed and are negative.      Objective:      Physical Exam   Constitutional: He is oriented to person, place, and time. He appears well-developed and well-nourished.   HENT:   Head: Normocephalic and atraumatic.   Right Ear: External ear normal.   Left Ear: External ear normal.   Nose: Nose normal.   Mouth/Throat: Oropharynx is clear and moist.   Eyes: Pupils are equal, round, and reactive to light. Conjunctivae and EOM are normal.   Neck: Normal range of motion. Neck supple.   Cardiovascular: Normal rate, regular rhythm, normal heart sounds and intact distal pulses.   Pulmonary/Chest: Effort normal and breath sounds normal.           Abdominal: Soft. Bowel sounds are normal.   Musculoskeletal: Normal range of motion.   Neurological: He is alert and oriented to person, place, and time. He has normal reflexes.   Skin: Skin is warm and dry.   Psychiatric: He has a normal mood and affect. His behavior is normal. Thought content normal.   Vitals reviewed.      Assessment:       1. Epidermal inclusion cyst      doing well  Plan:       I will take out half of the sutures and see him back in a week

## 2019-06-25 ENCOUNTER — OFFICE VISIT (OUTPATIENT)
Dept: SURGERY | Facility: CLINIC | Age: 79
End: 2019-06-25
Payer: MEDICARE

## 2019-06-25 VITALS
HEIGHT: 70 IN | WEIGHT: 116 LBS | DIASTOLIC BLOOD PRESSURE: 62 MMHG | SYSTOLIC BLOOD PRESSURE: 98 MMHG | HEART RATE: 99 BPM | BODY MASS INDEX: 16.61 KG/M2

## 2019-06-25 DIAGNOSIS — L72.0 EPIDERMAL INCLUSION CYST: Primary | ICD-10-CM

## 2019-06-25 PROCEDURE — 99024 POSTOP FOLLOW-UP VISIT: CPT | Mod: S$GLB,POP,, | Performed by: SURGERY

## 2019-06-25 PROCEDURE — 99024 PR POST-OP FOLLOW-UP VISIT: ICD-10-PCS | Mod: S$GLB,POP,, | Performed by: SURGERY

## 2019-06-25 NOTE — PROGRESS NOTES
Subjective:       Patient ID: Enzo Wilson is a 79 y.o. male.    Chief Complaint: Follow-up    HPI Still with open wound and some drainage with sutures in place  Review of Systems   Constitutional: Negative.    HENT: Negative.    Eyes: Negative.    Respiratory: Negative.    Cardiovascular: Negative.    Gastrointestinal: Negative.    Endocrine: Negative.    Musculoskeletal: Negative.    Skin: Negative.    Allergic/Immunologic: Negative.    Neurological: Negative.    Hematological: Negative.    Psychiatric/Behavioral: Negative.    All other systems reviewed and are negative.      Objective:      Physical Exam   Constitutional: He is oriented to person, place, and time. He appears well-developed and well-nourished.   HENT:   Head: Normocephalic and atraumatic.   Right Ear: External ear normal.   Left Ear: External ear normal.   Nose: Nose normal.   Mouth/Throat: Oropharynx is clear and moist.   Eyes: Pupils are equal, round, and reactive to light. Conjunctivae and EOM are normal.   Neck: Normal range of motion. Neck supple.   Cardiovascular: Normal rate, regular rhythm, normal heart sounds and intact distal pulses.   Pulmonary/Chest: Effort normal and breath sounds normal.           Abdominal: Soft. Bowel sounds are normal.   Musculoskeletal: Normal range of motion.   Neurological: He is alert and oriented to person, place, and time. He has normal reflexes.   Skin: Skin is warm and dry.   Psychiatric: He has a normal mood and affect. His behavior is normal. Thought content normal.   Vitals reviewed.      Assessment:       1. Epidermal inclusion cyst      still with open wound  Plan:       I will clean the wound and see him back in 1 week for re eval

## 2019-06-30 ENCOUNTER — EXTERNAL CHRONIC CARE MANAGEMENT (OUTPATIENT)
Dept: PRIMARY CARE CLINIC | Facility: CLINIC | Age: 79
End: 2019-06-30
Payer: MEDICARE

## 2019-06-30 PROCEDURE — 99490 CHRNC CARE MGMT STAFF 1ST 20: CPT | Mod: S$PBB,,, | Performed by: FAMILY MEDICINE

## 2019-06-30 PROCEDURE — 99490 PR CHRONIC CARE MGMT, 1ST 20 MIN: ICD-10-PCS | Mod: S$PBB,,, | Performed by: FAMILY MEDICINE

## 2019-06-30 PROCEDURE — 99490 CHRNC CARE MGMT STAFF 1ST 20: CPT | Mod: PBBFAC,PO | Performed by: FAMILY MEDICINE

## 2019-07-02 ENCOUNTER — OFFICE VISIT (OUTPATIENT)
Dept: SURGERY | Facility: CLINIC | Age: 79
End: 2019-07-02
Payer: MEDICARE

## 2019-07-02 VITALS
HEART RATE: 95 BPM | OXYGEN SATURATION: 93 % | SYSTOLIC BLOOD PRESSURE: 100 MMHG | WEIGHT: 116.88 LBS | RESPIRATION RATE: 16 BRPM | BODY MASS INDEX: 16.77 KG/M2 | DIASTOLIC BLOOD PRESSURE: 72 MMHG

## 2019-07-02 DIAGNOSIS — L72.0 EPIDERMAL INCLUSION CYST: Primary | ICD-10-CM

## 2019-07-02 PROCEDURE — 99024 PR POST-OP FOLLOW-UP VISIT: ICD-10-PCS | Mod: S$GLB,POP,, | Performed by: SURGERY

## 2019-07-02 PROCEDURE — 99024 POSTOP FOLLOW-UP VISIT: CPT | Mod: S$GLB,POP,, | Performed by: SURGERY

## 2019-07-02 NOTE — PROGRESS NOTES
Subjective:       Patient ID: Enzo Wilson is a 79 y.o. male.    Chief Complaint: Post-op Evaluation    HPI follow up for suture removal with minimal drainage  Review of Systems   Constitutional: Negative.    HENT: Negative.    Eyes: Negative.    Respiratory: Negative.    Cardiovascular: Negative.    Gastrointestinal: Negative.    Endocrine: Negative.    Musculoskeletal: Negative.    Skin: Negative.    Allergic/Immunologic: Negative.    Neurological: Negative.    Hematological: Negative.    Psychiatric/Behavioral: Negative.    All other systems reviewed and are negative.      Objective:      Physical Exam   Constitutional: He is oriented to person, place, and time. He appears well-developed and well-nourished.   HENT:   Head: Normocephalic and atraumatic.   Right Ear: External ear normal.   Left Ear: External ear normal.   Nose: Nose normal.   Mouth/Throat: Oropharynx is clear and moist.   Eyes: Pupils are equal, round, and reactive to light. Conjunctivae and EOM are normal.   Neck: Normal range of motion. Neck supple.   Cardiovascular: Normal rate, regular rhythm, normal heart sounds and intact distal pulses.   Pulmonary/Chest: Effort normal and breath sounds normal.           Abdominal: Soft. Bowel sounds are normal.   Musculoskeletal: Normal range of motion.   Neurological: He is alert and oriented to person, place, and time. He has normal reflexes.   Skin: Skin is warm and dry.   Psychiatric: He has a normal mood and affect. His behavior is normal. Thought content normal.   Vitals reviewed.      Assessment:       1. Epidermal inclusion cyst        Plan:       Suture removed and follow up prn

## 2019-07-25 ENCOUNTER — TELEPHONE (OUTPATIENT)
Dept: FAMILY MEDICINE | Facility: CLINIC | Age: 79
End: 2019-07-25

## 2019-07-25 NOTE — TELEPHONE ENCOUNTER
Spoke with Page states she's faxing over a form for medical necessity. Advised once fax is received it will be completed and faxed back.

## 2019-07-25 NOTE — TELEPHONE ENCOUNTER
----- Message from Pam Peter sent at 7/25/2019  9:32 AM CDT -----  Contact: Page ( [employer] )  Name of Who is Calling: Page ( [employer] )       What is the request in detail: Page [employer] ) is requesting a call from staff she would like clarity in regards to the patients oxygen CMN .....Please contact to further discuss and advise.     Can the clinic reply by [facility]: no     What [facility]: [phone]

## 2019-07-31 ENCOUNTER — EXTERNAL CHRONIC CARE MANAGEMENT (OUTPATIENT)
Dept: PRIMARY CARE CLINIC | Facility: CLINIC | Age: 79
End: 2019-07-31
Payer: MEDICARE

## 2019-07-31 PROCEDURE — 99490 PR CHRONIC CARE MGMT, 1ST 20 MIN: ICD-10-PCS | Mod: S$PBB,,, | Performed by: FAMILY MEDICINE

## 2019-07-31 PROCEDURE — 99490 CHRNC CARE MGMT STAFF 1ST 20: CPT | Mod: S$PBB,,, | Performed by: FAMILY MEDICINE

## 2019-07-31 PROCEDURE — 99490 CHRNC CARE MGMT STAFF 1ST 20: CPT | Mod: PBBFAC,PO | Performed by: FAMILY MEDICINE

## 2019-08-08 ENCOUNTER — OFFICE VISIT (OUTPATIENT)
Dept: FAMILY MEDICINE | Facility: CLINIC | Age: 79
End: 2019-08-08
Payer: MEDICARE

## 2019-08-08 VITALS
DIASTOLIC BLOOD PRESSURE: 68 MMHG | TEMPERATURE: 98 F | HEIGHT: 70 IN | SYSTOLIC BLOOD PRESSURE: 114 MMHG | WEIGHT: 120.06 LBS | BODY MASS INDEX: 17.19 KG/M2 | OXYGEN SATURATION: 95 % | HEART RATE: 88 BPM

## 2019-08-08 DIAGNOSIS — I10 ESSENTIAL HYPERTENSION: Chronic | ICD-10-CM

## 2019-08-08 DIAGNOSIS — R35.0 FREQUENT URINATION: ICD-10-CM

## 2019-08-08 DIAGNOSIS — J44.9 CHRONIC OBSTRUCTIVE PULMONARY DISEASE, UNSPECIFIED COPD TYPE: Primary | Chronic | ICD-10-CM

## 2019-08-08 LAB
BILIRUB SERPL-MCNC: NEGATIVE MG/DL
BLOOD URINE, POC: NEGATIVE
COLOR, POC UA: YELLOW
GLUCOSE UR QL STRIP: NORMAL
KETONES UR QL STRIP: NEGATIVE
LEUKOCYTE ESTERASE URINE, POC: NEGATIVE
NITRITE, POC UA: ABNORMAL
PH, POC UA: 7
PROTEIN, POC: ABNORMAL
SPECIFIC GRAVITY, POC UA: 1.01
UROBILINOGEN, POC UA: NORMAL

## 2019-08-08 PROCEDURE — 99999 PR PBB SHADOW E&M-EST. PATIENT-LVL III: CPT | Mod: PBBFAC,,, | Performed by: FAMILY MEDICINE

## 2019-08-08 PROCEDURE — 81001 URINALYSIS AUTO W/SCOPE: CPT | Mod: PBBFAC,PO | Performed by: FAMILY MEDICINE

## 2019-08-08 PROCEDURE — 99213 OFFICE O/P EST LOW 20 MIN: CPT | Mod: PBBFAC,PO | Performed by: FAMILY MEDICINE

## 2019-08-08 PROCEDURE — 99999 PR PBB SHADOW E&M-EST. PATIENT-LVL III: ICD-10-PCS | Mod: PBBFAC,,, | Performed by: FAMILY MEDICINE

## 2019-08-08 PROCEDURE — 99214 OFFICE O/P EST MOD 30 MIN: CPT | Mod: S$PBB,,, | Performed by: FAMILY MEDICINE

## 2019-08-08 PROCEDURE — 99214 PR OFFICE/OUTPT VISIT, EST, LEVL IV, 30-39 MIN: ICD-10-PCS | Mod: S$PBB,,, | Performed by: FAMILY MEDICINE

## 2019-08-08 NOTE — PROGRESS NOTES
[facility]  Progress Note    SUBJECTIVE:     Chief Complaint   Patient presents with    Forms    Urinary Frequency       HPI   Weakley GEETHA Wilson  is a 79 y.o. male here for c/o increased urinary frequency. No dysuria, flank or pelvic pain. Also here for f/u of his COPD, which he is oxygen dependent at 3L  NC. Needs forms filled out.     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 ENDO    ESOPHAGOSCOPY N/A 3/19/2014    Performed by Chencho Gomez [facility] at [facility] OR    EXCISION, LESION N/A 3/19/2014    Performed by [facility] at [facility] OR    EXCISION, MASS, BACK N/A 6/10/2019    Performed by Canelo [facility]wood [facility] at [facility] OR    MICROLARYNGOSCOPY, WITH PROCEDURE USING LASER N/A 3/19/2014    Performed by [facility] at [facility] OR    TONSILLECTOMY       Family History   Problem Relation Age of Onset    Heart disease Father     Hypertension Father     Hypertension Son     Heart disease Maternal Uncle     Hypertension Maternal Grandmother     Hypertension Maternal Grandfather     Kidney disease Mother      Social History     Tobacco Use    Smoking status: Current Some Day Smoker     Packs/day: 1.00     Years: 55.00     Pack years: 55.00    Smokeless tobacco: Never Used    Tobacco comment: has been weaning down   Substance Use Topics    Alcohol use: Yes     Alcohol/week: 3.6 oz     Types: 6 Cans of beer per week     Comment: 1-2  DRINKS A WEEK    Drug use: No        Review of Systems   Constitutional: Negative for chills, fever and  malaise/fatigue.   HENT: Negative.    Respiratory: Positive for shortness of breath (on Exertion). Negative for cough and wheezing.    Cardiovascular: Negative.  Negative for chest pain.   Gastrointestinal: Negative.  Negative for abdominal pain, nausea and vomiting.   Genitourinary: Positive for frequency. Negative for dysuria, flank pain, hematuria and urgency.   Neurological: Negative for weakness and headaches.   All other systems reviewed and are negative.    OBJECTIVE:     Vitals:    08/08/19 1453   BP: 114/68   Pulse: 88   Temp: 98 °F (36.7 °C)     Body mass index is 17.22 kg/m².    Physical Exam   Constitutional: He is oriented to person, place, and time and well-developed, well-nourished, and in no distress. No distress.   HENT:   Head: Normocephalic and atraumatic.   Nose: Nose normal.   Eyes: Conjunctivae and EOM are normal.   Cardiovascular: Normal rate, regular rhythm and normal heart sounds. Exam reveals no gallop and no friction rub.   No murmur heard.  Pulmonary/Chest: Effort normal. No respiratory distress. He has no wheezes. He has no rales. He exhibits no tenderness.   + 3 L NC, coarse breath sounds b/l   Abdominal: Soft. Bowel sounds are normal. He exhibits no distension. There is no tenderness. There is no rebound.   Neurological: He is alert and oriented to person, place, and time.   Skin: Skin is warm. He is not diaphoretic.       Old records were reviewed. Labs and/or images were independently reviewed.    ASSESSMENT     1. Chronic obstructive pulmonary disease, unspecified COPD type    2. Essential hypertension    3. Frequent urination        PLAN:     Chronic obstructive pulmonary disease, unspecified COPD type   -     Resting 87% on room air. 95% with 3 L NC O2.    -     Continue with current regimen.     Essential hypertension   -     Stable. Continue current regimen.    Frequent urination  -     Microalbumin/creatinine urine ratio; Future; Expected date: 08/08/2019  -     Urine  culture      RTC PRN    Lewis Self MD  08/08/2019 3:12 PM

## 2019-08-14 ENCOUNTER — TELEPHONE (OUTPATIENT)
Dept: FAMILY MEDICINE | Facility: CLINIC | Age: 79
End: 2019-08-14

## 2019-08-14 NOTE — TELEPHONE ENCOUNTER
----- Message from Christine Joseph sent at 8/13/2019  4:48 PM CDT -----  Contact:  [employer]   Type: Patient Call Back    Who called: Page     What is the request in detail:Calling to check the status of a fax sent over. Please advise   Can the clinic reply by [facility]?Call     Would the patient rather a call back or a response via [facility]? Call   Best call back number: [phone]

## 2019-08-14 NOTE — TELEPHONE ENCOUNTER
Spoke with Page and she informed me that she sent over DME form for patient in July 2019 to continue oxygen therapy and have not yet received the form. Will also need most resent note with oxygen reading. Since we can not locate form. She will refax form

## 2019-08-19 ENCOUNTER — TELEPHONE (OUTPATIENT)
Dept: FAMILY MEDICINE | Facility: CLINIC | Age: 79
End: 2019-08-19

## 2019-08-19 NOTE — TELEPHONE ENCOUNTER
----- Message from Linn Cervantes sent at 8/19/2019  1:17 PM CDT -----  Contact: [employer]  Type: Patient Call Back    Who called: [facility] Abel    What is the request in detail: FITZ Abel calling to check the status of the pt Oxygen    Can the clinic reply by [facility]? No     Would the patient rather a call back or a response via [facility]? Call back     Best call back number: [phone]

## 2019-08-31 ENCOUNTER — EXTERNAL CHRONIC CARE MANAGEMENT (OUTPATIENT)
Dept: PRIMARY CARE CLINIC | Facility: CLINIC | Age: 79
End: 2019-08-31
Payer: MEDICARE

## 2019-08-31 PROCEDURE — 99490 CHRNC CARE MGMT STAFF 1ST 20: CPT | Mod: PBBFAC,PO | Performed by: FAMILY MEDICINE

## 2019-08-31 PROCEDURE — 99490 PR CHRONIC CARE MGMT, 1ST 20 MIN: ICD-10-PCS | Mod: S$PBB,,, | Performed by: FAMILY MEDICINE

## 2019-08-31 PROCEDURE — 99490 CHRNC CARE MGMT STAFF 1ST 20: CPT | Mod: S$PBB,,, | Performed by: FAMILY MEDICINE

## 2019-09-11 ENCOUNTER — TELEPHONE (OUTPATIENT)
Dept: FAMILY MEDICINE | Facility: CLINIC | Age: 79
End: 2019-09-11

## 2019-09-11 NOTE — TELEPHONE ENCOUNTER
----- Message from Bayron Looney sent at 9/11/2019 11:35 AM CDT -----  Hi can someone call the pt please.

## 2019-09-30 ENCOUNTER — EXTERNAL CHRONIC CARE MANAGEMENT (OUTPATIENT)
Dept: PRIMARY CARE CLINIC | Facility: CLINIC | Age: 79
End: 2019-09-30
Payer: MEDICARE

## 2019-09-30 PROCEDURE — 99490 CHRNC CARE MGMT STAFF 1ST 20: CPT | Mod: S$PBB,,, | Performed by: FAMILY MEDICINE

## 2019-09-30 PROCEDURE — 99490 PR CHRONIC CARE MGMT, 1ST 20 MIN: ICD-10-PCS | Mod: S$PBB,,, | Performed by: FAMILY MEDICINE

## 2019-09-30 PROCEDURE — 99490 CHRNC CARE MGMT STAFF 1ST 20: CPT | Mod: PBBFAC,PO | Performed by: FAMILY MEDICINE

## 2019-10-15 ENCOUNTER — OFFICE VISIT (OUTPATIENT)
Dept: FAMILY MEDICINE | Facility: CLINIC | Age: 79
End: 2019-10-15
Payer: MEDICARE

## 2019-10-15 VITALS
BODY MASS INDEX: 16.45 KG/M2 | SYSTOLIC BLOOD PRESSURE: 110 MMHG | HEIGHT: 70 IN | TEMPERATURE: 98 F | OXYGEN SATURATION: 95 % | HEART RATE: 106 BPM | DIASTOLIC BLOOD PRESSURE: 66 MMHG | WEIGHT: 114.88 LBS

## 2019-10-15 DIAGNOSIS — J44.9 CHRONIC OBSTRUCTIVE PULMONARY DISEASE, UNSPECIFIED COPD TYPE: ICD-10-CM

## 2019-10-15 DIAGNOSIS — Z00.00 ENCOUNTER FOR PREVENTIVE HEALTH EXAMINATION: Primary | ICD-10-CM

## 2019-10-15 DIAGNOSIS — J96.10 CHRONIC RESPIRATORY FAILURE, UNSPECIFIED WHETHER WITH HYPOXIA OR HYPERCAPNIA: Chronic | ICD-10-CM

## 2019-10-15 DIAGNOSIS — E44.1 MILD MALNUTRITION: ICD-10-CM

## 2019-10-15 DIAGNOSIS — I27.21 PULMONARY ARTERY HYPERTENSION: Chronic | ICD-10-CM

## 2019-10-15 DIAGNOSIS — I70.0 ATHEROSCLEROSIS OF AORTA: Chronic | ICD-10-CM

## 2019-10-15 DIAGNOSIS — Z23 NEED FOR INFLUENZA VACCINATION: ICD-10-CM

## 2019-10-15 PROCEDURE — 90662 IIV NO PRSV INCREASED AG IM: CPT | Mod: PBBFAC,PO

## 2019-10-15 PROCEDURE — 99999 PR PBB SHADOW E&M-EST. PATIENT-LVL IV: CPT | Mod: PBBFAC,,, | Performed by: NURSE PRACTITIONER

## 2019-10-15 PROCEDURE — G0439 PR MEDICARE ANNUAL WELLNESS SUBSEQUENT VISIT: ICD-10-PCS | Mod: S$GLB,,, | Performed by: NURSE PRACTITIONER

## 2019-10-15 PROCEDURE — G0439 PPPS, SUBSEQ VISIT: HCPCS | Mod: S$GLB,,, | Performed by: NURSE PRACTITIONER

## 2019-10-15 PROCEDURE — 99214 OFFICE O/P EST MOD 30 MIN: CPT | Mod: PBBFAC,PO | Performed by: NURSE PRACTITIONER

## 2019-10-15 PROCEDURE — 99999 PR PBB SHADOW E&M-EST. PATIENT-LVL IV: ICD-10-PCS | Mod: PBBFAC,,, | Performed by: NURSE PRACTITIONER

## 2019-10-15 RX ORDER — IPRATROPIUM BROMIDE AND ALBUTEROL SULFATE 2.5; .5 MG/3ML; MG/3ML
SOLUTION RESPIRATORY (INHALATION)
Qty: 1080 ML | Refills: 1 | Status: SHIPPED | OUTPATIENT
Start: 2019-10-15 | End: 2019-12-02 | Stop reason: SDUPTHER

## 2019-10-15 RX ORDER — IPRATROPIUM BROMIDE AND ALBUTEROL SULFATE 2.5; .5 MG/3ML; MG/3ML
3 SOLUTION RESPIRATORY (INHALATION) EVERY 6 HOURS PRN
Qty: 100 VIAL | Refills: 1 | Status: SHIPPED | OUTPATIENT
Start: 2019-10-15 | End: 2019-10-15 | Stop reason: SDUPTHER

## 2019-10-15 NOTE — PATIENT INSTRUCTIONS
Counseling and Referral of Other Preventative  (Italic type indicates deductible and co-insurance are waived)    Patient Name: Enzo Wilson  Today's Date: 10/15/2019    Health Maintenance       Date Due Completion Date    Shingles Vaccine (1 of 2) 06/12/1991 ---    Influenza Vaccine (1) 09/01/2019 10/26/2017    Lipid Panel 05/01/2024 5/1/2019    TETANUS VACCINE 11/18/2025 11/18/2015 (Done)    Override on 11/18/2015: Done        Orders Placed This Encounter   Procedures    Influenza - High Dose (65+) (PF) (IM)     The following information is provided to all patients.  This information is to help you find resources for any of the problems found today that may be affecting your health:                Living healthy guide: www.[facility].[place].gov      Understanding Diabetes: www.diabetes.org      Eating healthy: www.cdc.gov/healthyweight      [facility] home safety checklist: www.cdc.gov/steadi/patient.html      Agency on Aging: www.goea.[place].gov      Alcoholics anonymous (AA): www.aa.org      Physical Activity: www.nuzhat.nih.gov/cr5hzek      Tobacco use: www.quitwithusla.org

## 2019-10-15 NOTE — PROGRESS NOTES
"Enzo Wilson presented for a  Medicare AWV and comprehensive Health Risk Assessment today. The following components were reviewed and updated:    · Medical history  · Family History  · Social history  · Allergies and Current Medications  · Health Risk Assessment  · Health Maintenance  · Care Team     ** See Completed Assessments for Annual Wellness Visit within the encounter summary.**       The following assessments were completed:  · Living Situation  · CAGE  · Depression Screening  · Timed Get Up and Go  · Whisper Test  · Cognitive Function Screening  · Nutrition Screening  · ADL Screening  · PAQ Screening    Vitals:    10/15/19 1139   BP: 110/66   BP Location: Left arm   Patient Position: Sitting   BP Method: Small (Manual)   Pulse: 106   Temp: 97.9 °F (36.6 °C)   TempSrc: Oral   SpO2: 95%   Weight: 52.1 kg (114 lb 13.8 oz)   Height: 5' 10" (1.778 m)     Body mass index is 16.48 kg/m².  Physical Exam   Constitutional: He is oriented to person, place, and time. He appears well-developed and well-nourished.   HENT:   Head: Normocephalic and atraumatic.   Cardiovascular: Normal rate, regular rhythm and normal heart sounds.   Pulmonary/Chest: Effort normal and breath sounds normal. No respiratory distress. He has no decreased breath sounds.   Neurological: He is alert and oriented to person, place, and time.   Skin: He is not diaphoretic. No pallor.   Psychiatric: He has a normal mood and affect. His speech is normal and behavior is normal.               Diagnoses and health risks identified today and associated recommendations/orders:    1. Encounter for preventive health examination    2. Chronic obstructive pulmonary disease, unspecified COPD type  - albuterol-ipratropium (DUO-NEB) 2.5 mg-0.5 mg/3 mL nebulizer solution; Take 3 mLs by nebulization every 6 (six) hours as needed for Wheezing or Shortness of Breath.  Dispense: 100 vial; Refill: 1    3. Atherosclerosis of aorta    4. Chronic respiratory failure, " unspecified whether with hypoxia or hypercapnia    5. Pulmonary artery hypertension    6. Mild malnutrition    7. Need for influenza vaccination  - Influenza - High Dose (65+) (PF) (IM)    Problem List Items Addressed This Visit     Pulmonary artery hypertension (Chronic)    Current Assessment & Plan     Stable. Continue to monitor           Mild malnutrition    Current Assessment & Plan     Stable. Continue to monitor         COPD (chronic obstructive pulmonary disease) (Chronic)    Current Assessment & Plan     Stable. Continue to monitor  He is on home O2         Relevant Medications    albuterol-ipratropium (DUO-NEB) 2.5 mg-0.5 mg/3 mL nebulizer solution    Chronic respiratory failure (Chronic)    Current Assessment & Plan     Stable. Continue to monitor  He is on home O2         Atherosclerosis of aorta (Chronic)    Current Assessment & Plan     Stable. Continue to monitor           Other Visit Diagnoses     Encounter for preventive health examination    -  Primary    Need for influenza vaccination        Relevant Orders    Influenza - High Dose (65+) (PF) (IM) (Completed)          Provided [place] with a 5-10 year written screening schedule and personal prevention plan. Recommendations were developed using the USPSTF age appropriate recommendations. Education, counseling, and referrals were provided as needed. After Visit Summary printed and given to patient which includes a list of additional screenings\tests needed.    Follow up in about 1 year (around 10/15/2020).    Sabina Randle, KATIE-C  I offered to discuss end of life issues, including information on how to make advance directives that the patient could use to name someone who would make medical decisions on their behalf if they became too ill to make themselves.    ___Patient declined  _X_Patient is interested, I provided paper work and offered to discuss.

## 2019-10-24 ENCOUNTER — TELEPHONE (OUTPATIENT)
Dept: FAMILY MEDICINE | Facility: CLINIC | Age: 79
End: 2019-10-24

## 2019-10-24 NOTE — TELEPHONE ENCOUNTER
Received from pharmacy ([facility]) plan does not cover this medication for IPRATROPI/ALB 0.5/3 MG INH SL 30 X 3 ML.

## 2019-10-31 ENCOUNTER — EXTERNAL CHRONIC CARE MANAGEMENT (OUTPATIENT)
Dept: PRIMARY CARE CLINIC | Facility: CLINIC | Age: 79
End: 2019-10-31
Payer: MEDICARE

## 2019-10-31 PROCEDURE — 99490 PR CHRONIC CARE MGMT, 1ST 20 MIN: ICD-10-PCS | Mod: S$PBB,,, | Performed by: FAMILY MEDICINE

## 2019-10-31 PROCEDURE — 99490 CHRNC CARE MGMT STAFF 1ST 20: CPT | Mod: S$PBB,,, | Performed by: FAMILY MEDICINE

## 2019-10-31 PROCEDURE — 99490 CHRNC CARE MGMT STAFF 1ST 20: CPT | Mod: PBBFAC,PO | Performed by: FAMILY MEDICINE

## 2019-11-14 ENCOUNTER — CLINICAL SUPPORT (OUTPATIENT)
Dept: SMOKING CESSATION | Facility: CLINIC | Age: 79
End: 2019-11-14
Payer: COMMERCIAL

## 2019-11-14 DIAGNOSIS — F17.200 NICOTINE DEPENDENCE: Primary | ICD-10-CM

## 2019-11-14 PROCEDURE — 99407 PR TOBACCO USE CESSATION INTENSIVE >10 MINUTES: ICD-10-PCS | Mod: S$GLB,,,

## 2019-11-14 PROCEDURE — 99407 BEHAV CHNG SMOKING > 10 MIN: CPT | Mod: S$GLB,,,

## 2019-11-14 NOTE — PROGRESS NOTES
Successful contact with patient regarding tobacco cessation quit #2. Pt states, he was unable to become tobacco free, he currently smoke 7-9 cigarettes per day, and he's not ready to return to the program at this time.  Pt informed of his benefit status, future telephone follow ups, and contact information to schedule an appointment when he's ready. Will update the tobacco cessation smart form for 3-6 months on quit #2.

## 2019-11-30 ENCOUNTER — EXTERNAL CHRONIC CARE MANAGEMENT (OUTPATIENT)
Dept: PRIMARY CARE CLINIC | Facility: CLINIC | Age: 79
End: 2019-11-30
Payer: MEDICARE

## 2019-11-30 PROCEDURE — 99490 CHRNC CARE MGMT STAFF 1ST 20: CPT | Mod: S$PBB,,, | Performed by: FAMILY MEDICINE

## 2019-11-30 PROCEDURE — 99490 CHRNC CARE MGMT STAFF 1ST 20: CPT | Mod: PBBFAC,PO | Performed by: FAMILY MEDICINE

## 2019-11-30 PROCEDURE — 99490 PR CHRONIC CARE MGMT, 1ST 20 MIN: ICD-10-PCS | Mod: S$PBB,,, | Performed by: FAMILY MEDICINE

## 2019-12-02 DIAGNOSIS — I10 ESSENTIAL HYPERTENSION: Chronic | ICD-10-CM

## 2019-12-02 DIAGNOSIS — J44.9 CHRONIC OBSTRUCTIVE PULMONARY DISEASE, UNSPECIFIED COPD TYPE: Chronic | ICD-10-CM

## 2019-12-02 RX ORDER — FLUTICASONE PROPIONATE AND SALMETEROL 250; 50 UG/1; UG/1
1 POWDER RESPIRATORY (INHALATION) 2 TIMES DAILY
Qty: 180 EACH | Refills: 6 | Status: SHIPPED | OUTPATIENT
Start: 2019-12-02 | End: 2020-10-13

## 2019-12-02 RX ORDER — AMLODIPINE BESYLATE 10 MG/1
10 TABLET ORAL DAILY
Qty: 90 TABLET | Refills: 3 | Status: SHIPPED | OUTPATIENT
Start: 2019-12-02 | End: 2021-01-12

## 2019-12-02 RX ORDER — IPRATROPIUM BROMIDE AND ALBUTEROL SULFATE 2.5; .5 MG/3ML; MG/3ML
SOLUTION RESPIRATORY (INHALATION)
Qty: 1080 ML | Refills: 1 | Status: SHIPPED | OUTPATIENT
Start: 2019-12-02 | End: 2021-01-20 | Stop reason: SDUPTHER

## 2019-12-02 NOTE — TELEPHONE ENCOUNTER
----- Message from Doris Kelly sent at 12/2/2019 11:33 AM CST -----  Contact: pt  Can the clinic reply in [facility]: n       Please refill the medication(s) listed below. Please call the patient when the prescription(s) is ready for  at this phone number  [phone]      Medication #1 fluticasone-salmeterol 250-50 mcg/dose (ADVAIR DISKUS) 250-50 mcg/dose diskus inhaler    Medication #2 amLODIPine (NORVASC) 10 MG tablet    albuterol-ipratropium (DUO-NEB) 2.5 mg-0.5 mg/3 mL nebulizer solution         Preferred Pharmacy:  [id number] - [address]

## 2019-12-31 ENCOUNTER — EXTERNAL CHRONIC CARE MANAGEMENT (OUTPATIENT)
Dept: PRIMARY CARE CLINIC | Facility: CLINIC | Age: 79
End: 2019-12-31
Payer: MEDICARE

## 2019-12-31 PROCEDURE — 99490 CHRNC CARE MGMT STAFF 1ST 20: CPT | Mod: PBBFAC,PO | Performed by: FAMILY MEDICINE

## 2019-12-31 PROCEDURE — 99490 CHRNC CARE MGMT STAFF 1ST 20: CPT | Mod: S$PBB,,, | Performed by: FAMILY MEDICINE

## 2019-12-31 PROCEDURE — 99490 PR CHRONIC CARE MGMT, 1ST 20 MIN: ICD-10-PCS | Mod: S$PBB,,, | Performed by: FAMILY MEDICINE

## 2020-01-31 ENCOUNTER — EXTERNAL CHRONIC CARE MANAGEMENT (OUTPATIENT)
Dept: PRIMARY CARE CLINIC | Facility: CLINIC | Age: 80
End: 2020-01-31
Payer: MEDICARE

## 2020-01-31 PROCEDURE — 99490 CHRNC CARE MGMT STAFF 1ST 20: CPT | Mod: PBBFAC,PO | Performed by: FAMILY MEDICINE

## 2020-01-31 PROCEDURE — 99490 CHRNC CARE MGMT STAFF 1ST 20: CPT | Mod: S$GLB,,, | Performed by: FAMILY MEDICINE

## 2020-01-31 PROCEDURE — 99490 PR CHRONIC CARE MGMT, 1ST 20 MIN: ICD-10-PCS | Mod: S$GLB,,, | Performed by: FAMILY MEDICINE

## 2020-02-29 ENCOUNTER — EXTERNAL CHRONIC CARE MANAGEMENT (OUTPATIENT)
Dept: PRIMARY CARE CLINIC | Facility: CLINIC | Age: 80
End: 2020-02-29
Payer: MEDICARE

## 2020-02-29 PROCEDURE — 99490 PR CHRONIC CARE MGMT, 1ST 20 MIN: ICD-10-PCS | Mod: S$PBB,,, | Performed by: FAMILY MEDICINE

## 2020-02-29 PROCEDURE — 99490 CHRNC CARE MGMT STAFF 1ST 20: CPT | Mod: S$PBB,,, | Performed by: FAMILY MEDICINE

## 2020-03-31 ENCOUNTER — EXTERNAL CHRONIC CARE MANAGEMENT (OUTPATIENT)
Dept: PRIMARY CARE CLINIC | Facility: CLINIC | Age: 80
End: 2020-03-31
Payer: MEDICARE

## 2020-03-31 PROCEDURE — 99490 PR CHRONIC CARE MGMT, 1ST 20 MIN: ICD-10-PCS | Mod: S$PBB,,, | Performed by: FAMILY MEDICINE

## 2020-03-31 PROCEDURE — 99490 CHRNC CARE MGMT STAFF 1ST 20: CPT | Mod: S$PBB,,, | Performed by: FAMILY MEDICINE

## 2020-04-30 ENCOUNTER — EXTERNAL CHRONIC CARE MANAGEMENT (OUTPATIENT)
Dept: PRIMARY CARE CLINIC | Facility: CLINIC | Age: 80
End: 2020-04-30
Payer: MEDICARE

## 2020-04-30 PROCEDURE — 99490 CHRNC CARE MGMT STAFF 1ST 20: CPT | Mod: S$GLB,,, | Performed by: FAMILY MEDICINE

## 2020-04-30 PROCEDURE — 99490 PR CHRONIC CARE MGMT, 1ST 20 MIN: ICD-10-PCS | Mod: S$GLB,,, | Performed by: FAMILY MEDICINE

## 2020-05-12 ENCOUNTER — TELEPHONE (OUTPATIENT)
Dept: SMOKING CESSATION | Facility: CLINIC | Age: 80
End: 2020-05-12

## 2020-05-20 ENCOUNTER — TELEPHONE (OUTPATIENT)
Dept: SMOKING CESSATION | Facility: CLINIC | Age: 80
End: 2020-05-20

## 2020-05-25 ENCOUNTER — TELEPHONE (OUTPATIENT)
Dept: SMOKING CESSATION | Facility: CLINIC | Age: 80
End: 2020-05-25

## 2020-05-31 ENCOUNTER — EXTERNAL CHRONIC CARE MANAGEMENT (OUTPATIENT)
Dept: PRIMARY CARE CLINIC | Facility: CLINIC | Age: 80
End: 2020-05-31
Payer: MEDICARE

## 2020-05-31 PROCEDURE — 99490 PR CHRONIC CARE MGMT, 1ST 20 MIN: ICD-10-PCS | Mod: S$GLB,,, | Performed by: FAMILY MEDICINE

## 2020-05-31 PROCEDURE — 99490 CHRNC CARE MGMT STAFF 1ST 20: CPT | Mod: S$GLB,,, | Performed by: FAMILY MEDICINE

## 2020-06-12 DIAGNOSIS — I10 ESSENTIAL HYPERTENSION: ICD-10-CM

## 2020-06-30 ENCOUNTER — EXTERNAL CHRONIC CARE MANAGEMENT (OUTPATIENT)
Dept: PRIMARY CARE CLINIC | Facility: CLINIC | Age: 80
End: 2020-06-30
Payer: MEDICARE

## 2020-06-30 PROCEDURE — 99490 PR CHRONIC CARE MGMT, 1ST 20 MIN: ICD-10-PCS | Mod: S$GLB,,, | Performed by: FAMILY MEDICINE

## 2020-06-30 PROCEDURE — 99490 CHRNC CARE MGMT STAFF 1ST 20: CPT | Mod: S$GLB,,, | Performed by: FAMILY MEDICINE

## 2020-07-13 ENCOUNTER — OFFICE VISIT (OUTPATIENT)
Dept: FAMILY MEDICINE | Facility: CLINIC | Age: 80
End: 2020-07-13
Payer: MEDICARE

## 2020-07-13 VITALS
HEART RATE: 100 BPM | DIASTOLIC BLOOD PRESSURE: 62 MMHG | HEIGHT: 70 IN | WEIGHT: 110.25 LBS | TEMPERATURE: 98 F | OXYGEN SATURATION: 96 % | SYSTOLIC BLOOD PRESSURE: 112 MMHG | BODY MASS INDEX: 15.78 KG/M2

## 2020-07-13 DIAGNOSIS — I70.0 ATHEROSCLEROSIS OF AORTA: Chronic | ICD-10-CM

## 2020-07-13 DIAGNOSIS — Z72.0 TOBACCO ABUSE: Chronic | ICD-10-CM

## 2020-07-13 DIAGNOSIS — Z00.00 ENCOUNTER FOR PREVENTIVE HEALTH EXAMINATION: Primary | ICD-10-CM

## 2020-07-13 DIAGNOSIS — J96.10 CHRONIC RESPIRATORY FAILURE, UNSPECIFIED WHETHER WITH HYPOXIA OR HYPERCAPNIA: Chronic | ICD-10-CM

## 2020-07-13 DIAGNOSIS — I27.21 PULMONARY ARTERY HYPERTENSION: ICD-10-CM

## 2020-07-13 DIAGNOSIS — J44.9 CHRONIC OBSTRUCTIVE PULMONARY DISEASE, UNSPECIFIED COPD TYPE: Chronic | ICD-10-CM

## 2020-07-13 DIAGNOSIS — I10 ESSENTIAL HYPERTENSION: Chronic | ICD-10-CM

## 2020-07-13 DIAGNOSIS — E44.1 MILD MALNUTRITION: ICD-10-CM

## 2020-07-13 PROCEDURE — G0439 PR MEDICARE ANNUAL WELLNESS SUBSEQUENT VISIT: ICD-10-PCS | Mod: HCNC,S$GLB,, | Performed by: NURSE PRACTITIONER

## 2020-07-13 PROCEDURE — G0439 PPPS, SUBSEQ VISIT: HCPCS | Mod: HCNC,S$GLB,, | Performed by: NURSE PRACTITIONER

## 2020-07-13 PROCEDURE — 99999 PR PBB SHADOW E&M-EST. PATIENT-LVL IV: ICD-10-PCS | Mod: PBBFAC,HCNC,, | Performed by: NURSE PRACTITIONER

## 2020-07-13 PROCEDURE — 99499 UNLISTED E&M SERVICE: CPT | Mod: HCNC,S$GLB,, | Performed by: NURSE PRACTITIONER

## 2020-07-13 PROCEDURE — 99999 PR PBB SHADOW E&M-EST. PATIENT-LVL IV: CPT | Mod: PBBFAC,HCNC,, | Performed by: NURSE PRACTITIONER

## 2020-07-13 PROCEDURE — 3074F PR MOST RECENT SYSTOLIC BLOOD PRESSURE < 130 MM HG: ICD-10-PCS | Mod: HCNC,CPTII,S$GLB, | Performed by: NURSE PRACTITIONER

## 2020-07-13 PROCEDURE — 3078F PR MOST RECENT DIASTOLIC BLOOD PRESSURE < 80 MM HG: ICD-10-PCS | Mod: HCNC,CPTII,S$GLB, | Performed by: NURSE PRACTITIONER

## 2020-07-13 PROCEDURE — 99499 RISK ADDL DX/OHS AUDIT: ICD-10-PCS | Mod: HCNC,S$GLB,, | Performed by: NURSE PRACTITIONER

## 2020-07-13 PROCEDURE — 3074F SYST BP LT 130 MM HG: CPT | Mod: HCNC,CPTII,S$GLB, | Performed by: NURSE PRACTITIONER

## 2020-07-13 PROCEDURE — 3078F DIAST BP <80 MM HG: CPT | Mod: HCNC,CPTII,S$GLB, | Performed by: NURSE PRACTITIONER

## 2020-07-13 NOTE — PROGRESS NOTES
"  Enzo Wilson presented for an initial Medicare AWV today. The following components were reviewed and updated:    · Medical history  · Family History  · Social history  · Allergies and Current Medications  · Health Risk Assessment  · Health Maintenance  · Care Team    **See Completed Assessments for Annual Wellness visit with in the encounter summary    The following assessments were completed:  · Depression Screening  · Cognitive function Screening  · Timed Get Up Test  · Whisper Test    Vitals:    07/13/20 0744   BP: 112/62   BP Location: Left arm   Patient Position: Sitting   BP Method: Small (Automatic)   Pulse: 100   Temp: 97.9 °F (36.6 °C)   TempSrc: Other (see comments)   SpO2: 96%   Weight: 50 kg (110 lb 3.7 oz)   Height: 5' 10" (1.778 m)     Body mass index is 15.82 kg/m².   ]          Diagnoses and health risks identified today and associated recommendations/orders:  1. Encounter for preventive health examination  Counseled on age appropriate medical preventative services including age appropriate cancer screenings, age appropriate eye and dental exams, over all nutritional health, need for a consistent exercise regimen, and an over all push towards maintaining a vigorous and active lifestyle.  Counseled on age appropriate vaccines and discussed upcoming health care needs based on age/gender. Discussed good sleep hygiene and stress management.       2. Pulmonary artery hypertension  The current medical regimen is effective;  continue present plan and medications.      3. Atherosclerosis of aorta  Stable, asymptomatic, monitor. Encouraged continued compliance with directives, diet and lifestyle modifications as recommended       4. Chronic obstructive pulmonary disease, unspecified COPD type  The current medical regimen is effective;  continue present plan and medications.      5. Chronic respiratory failure, unspecified whether with hypoxia or hypercapnia  The current medical regimen is effective;  " continue present plan and medications.      6. Essential hypertension  Discussed sodium restriction, maintaining ideal body weight and regular exercise program as physiologic means to achieve blood pressure control. The patient will strive towards this. The current medical regimen is effective; continue present plan and medications. Recommended patient to check home readings to monitor and see pcp for followup as scheduled or sooner as needed. Patient was educated that both decongestant and anti-inflammatory medication may raise blood pressure    7. Mild malnutrition  The current medical regimen is effective;  continue present plan and medications.      8. Tobacco abuse  The current medical regimen is effective;  continue present plan and medications. Currently enrolled in smoking cessation.         Provided [place] with a 5-10 year written screening schedule and personal prevention plan. Recommendations were developed using the USPSTF age appropriate recommendations. Education, counseling, and referrals were provided as needed.  After Visit Summary printed and given to patient which includes a list of additional screenings\tests needed.    Follow up in about 6 months (around 1/13/2021), or if symptoms worsen or fail to improve.      Blanca Sung NP

## 2020-07-13 NOTE — PATIENT INSTRUCTIONS
Counseling and Referral of Other Preventative  (Italic type indicates deductible and co-insurance are waived)    Patient Name: Enzo Wilson  Today's Date: 7/13/2020    Health Maintenance       Date Due Completion Date    Shingles Vaccine (1 of 2) 06/12/1991 ---    Influenza Vaccine (1) 09/01/2020 10/15/2019    Lipid Panel 05/01/2024 5/1/2019    TETANUS VACCINE 11/18/2025 11/18/2015 (Done)    Override on 11/18/2015: Done        No orders of the defined types were placed in this encounter.    The following information is provided to all patients.  This information is to help you find resources for any of the problems found today that may be affecting your health:                Living healthy guide: www.[facility].[place].gov      Understanding Diabetes: www.diabetes.org      Eating healthy: www.cdc.gov/healthyweight      [facility] home safety checklist: www.cdc.gov/steadi/patient.html      Agency on Aging: www.goea.[place].gov      Alcoholics anonymous (AA): www.aa.org      Physical Activity: www.nuzhat.nih.gov/gn0enyb      Tobacco use: www.quitwithusla.org

## 2020-07-31 ENCOUNTER — EXTERNAL CHRONIC CARE MANAGEMENT (OUTPATIENT)
Dept: PRIMARY CARE CLINIC | Facility: CLINIC | Age: 80
End: 2020-07-31
Payer: MEDICARE

## 2020-07-31 PROCEDURE — 99490 PR CHRONIC CARE MGMT, 1ST 20 MIN: ICD-10-PCS | Mod: S$GLB,,, | Performed by: FAMILY MEDICINE

## 2020-07-31 PROCEDURE — 99490 CHRNC CARE MGMT STAFF 1ST 20: CPT | Mod: S$GLB,,, | Performed by: FAMILY MEDICINE

## 2020-07-31 PROCEDURE — G2058 CCM ADD 20MIN: HCPCS | Mod: S$GLB,,, | Performed by: FAMILY MEDICINE

## 2020-07-31 PROCEDURE — G2058 PR CHRON CARE MGMT, EA ADDTL 20 MINS: ICD-10-PCS | Mod: S$GLB,,, | Performed by: FAMILY MEDICINE

## 2020-08-31 ENCOUNTER — EXTERNAL CHRONIC CARE MANAGEMENT (OUTPATIENT)
Dept: PRIMARY CARE CLINIC | Facility: CLINIC | Age: 80
End: 2020-08-31
Payer: MEDICARE

## 2020-08-31 PROCEDURE — 99490 PR CHRONIC CARE MGMT, 1ST 20 MIN: ICD-10-PCS | Mod: S$GLB,,, | Performed by: FAMILY MEDICINE

## 2020-08-31 PROCEDURE — 99490 CHRNC CARE MGMT STAFF 1ST 20: CPT | Mod: S$GLB,,, | Performed by: FAMILY MEDICINE

## 2020-09-12 ENCOUNTER — HOSPITAL ENCOUNTER (INPATIENT)
Facility: HOSPITAL | Age: 80
LOS: 4 days | Discharge: HOME HEALTH | DRG: 189 | End: 2020-09-16
Attending: EMERGENCY MEDICINE | Admitting: HOSPITALIST
Payer: MEDICARE

## 2020-09-12 DIAGNOSIS — J96.21 ACUTE ON CHRONIC RESPIRATORY FAILURE WITH HYPOXIA AND HYPERCAPNIA: ICD-10-CM

## 2020-09-12 DIAGNOSIS — R06.89 HYPERCARBIA: ICD-10-CM

## 2020-09-12 DIAGNOSIS — I27.20 PULMONARY HTN: ICD-10-CM

## 2020-09-12 DIAGNOSIS — R09.02 HYPOXIA: ICD-10-CM

## 2020-09-12 DIAGNOSIS — R06.02 SHORTNESS OF BREATH: ICD-10-CM

## 2020-09-12 DIAGNOSIS — Z51.5 PALLIATIVE CARE ENCOUNTER: ICD-10-CM

## 2020-09-12 DIAGNOSIS — J96.22 ACUTE ON CHRONIC RESPIRATORY FAILURE WITH HYPOXIA AND HYPERCAPNIA: ICD-10-CM

## 2020-09-12 DIAGNOSIS — R07.9 CHEST PAIN: ICD-10-CM

## 2020-09-12 DIAGNOSIS — J44.1 COPD EXACERBATION: Primary | ICD-10-CM

## 2020-09-12 DIAGNOSIS — J18.9 COMMUNITY ACQUIRED PNEUMONIA, UNSPECIFIED LATERALITY: ICD-10-CM

## 2020-09-12 PROBLEM — J96.02 ACUTE HYPERCAPNIC RESPIRATORY FAILURE: Status: ACTIVE | Noted: 2020-09-12

## 2020-09-12 LAB
ALBUMIN SERPL BCP-MCNC: 3.3 G/DL (ref 3.5–5.2)
ALLENS TEST: ABNORMAL
ALP SERPL-CCNC: 52 U/L (ref 55–135)
ALT SERPL W/O P-5'-P-CCNC: 11 U/L (ref 10–44)
ANION GAP SERPL CALC-SCNC: 11 MMOL/L (ref 8–16)
AST SERPL-CCNC: 28 U/L (ref 10–40)
BASOPHILS # BLD AUTO: 0.02 K/UL (ref 0–0.2)
BASOPHILS NFR BLD: 0.3 % (ref 0–1.9)
BILIRUB SERPL-MCNC: 0.5 MG/DL (ref 0.1–1)
BNP SERPL-MCNC: 192 PG/ML (ref 0–99)
BUN SERPL-MCNC: 27 MG/DL (ref 8–23)
CALCIUM SERPL-MCNC: 9.4 MG/DL (ref 8.7–10.5)
CHLORIDE SERPL-SCNC: 97 MMOL/L (ref 95–110)
CK SERPL-CCNC: 105 U/L (ref 20–200)
CO2 SERPL-SCNC: 39 MMOL/L (ref 23–29)
CREAT SERPL-MCNC: 0.9 MG/DL (ref 0.5–1.4)
CRP SERPL-MCNC: 28.6 MG/L (ref 0–8.2)
CTP QC/QA: YES
DELSYS: ABNORMAL
DIFFERENTIAL METHOD: ABNORMAL
EOSINOPHIL # BLD AUTO: 0.1 K/UL (ref 0–0.5)
EOSINOPHIL NFR BLD: 1.1 % (ref 0–8)
ERYTHROCYTE [DISTWIDTH] IN BLOOD BY AUTOMATED COUNT: 15.9 % (ref 11.5–14.5)
EST. GFR  (AFRICAN AMERICAN): >60 ML/MIN/1.73 M^2
EST. GFR  (NON AFRICAN AMERICAN): >60 ML/MIN/1.73 M^2
FERRITIN SERPL-MCNC: 207 NG/ML (ref 20–300)
FLOW: 15
GLUCOSE SERPL-MCNC: 103 MG/DL (ref 70–110)
HCO3 UR-SCNC: 45.2 MMOL/L (ref 24–28)
HCT VFR BLD AUTO: 43.7 % (ref 40–54)
HGB BLD-MCNC: 12.4 G/DL (ref 14–18)
IMM GRANULOCYTES # BLD AUTO: 0.04 K/UL (ref 0–0.04)
IMM GRANULOCYTES NFR BLD AUTO: 0.6 % (ref 0–0.5)
LACTATE SERPL-SCNC: 0.7 MMOL/L (ref 0.5–2.2)
LDH SERPL L TO P-CCNC: 181 U/L (ref 110–260)
LYMPHOCYTES # BLD AUTO: 1.1 K/UL (ref 1–4.8)
LYMPHOCYTES NFR BLD: 17.8 % (ref 18–48)
MCH RBC QN AUTO: 26.9 PG (ref 27–31)
MCHC RBC AUTO-ENTMCNC: 28.4 G/DL (ref 32–36)
MCV RBC AUTO: 95 FL (ref 82–98)
MODE: ABNORMAL
MONOCYTES # BLD AUTO: 0.4 K/UL (ref 0.3–1)
MONOCYTES NFR BLD: 6.7 % (ref 4–15)
NEUTROPHILS # BLD AUTO: 4.6 K/UL (ref 1.8–7.7)
NEUTROPHILS NFR BLD: 73.5 % (ref 38–73)
NRBC BLD-RTO: 0 /100 WBC
PCO2 BLDA: 99.1 MMHG (ref 35–45)
PH SMN: 7.27 [PH] (ref 7.35–7.45)
PLATELET # BLD AUTO: 154 K/UL (ref 150–350)
PMV BLD AUTO: 9 FL (ref 9.2–12.9)
PO2 BLDA: 62 MMHG (ref 80–100)
POC BE: 13 MMOL/L
POC SATURATED O2: 85 % (ref 95–100)
POC TCO2: 48 MMOL/L (ref 23–27)
POTASSIUM SERPL-SCNC: 4.2 MMOL/L (ref 3.5–5.1)
PROT SERPL-MCNC: 7.4 G/DL (ref 6–8.4)
RBC # BLD AUTO: 4.61 M/UL (ref 4.6–6.2)
SAMPLE: ABNORMAL
SARS-COV-2 RDRP RESP QL NAA+PROBE: NEGATIVE
SITE: ABNORMAL
SODIUM SERPL-SCNC: 147 MMOL/L (ref 136–145)
TROPONIN I SERPL DL<=0.01 NG/ML-MCNC: 0.02 NG/ML (ref 0–0.03)
WBC # BLD AUTO: 6.24 K/UL (ref 3.9–12.7)

## 2020-09-12 PROCEDURE — 94761 N-INVAS EAR/PLS OXIMETRY MLT: CPT | Mod: HCNC

## 2020-09-12 PROCEDURE — 27000190 HC CPAP FULL FACE MASK W/VALVE: Mod: HCNC

## 2020-09-12 PROCEDURE — 12000002 HC ACUTE/MED SURGE SEMI-PRIVATE ROOM: Mod: HCNC

## 2020-09-12 PROCEDURE — U0002 COVID-19 LAB TEST NON-CDC: HCPCS | Mod: HCNC | Performed by: EMERGENCY MEDICINE

## 2020-09-12 PROCEDURE — 93010 ELECTROCARDIOGRAM REPORT: CPT | Mod: HCNC,,, | Performed by: INTERNAL MEDICINE

## 2020-09-12 PROCEDURE — 85025 COMPLETE CBC W/AUTO DIFF WBC: CPT | Mod: HCNC

## 2020-09-12 PROCEDURE — 96365 THER/PROPH/DIAG IV INF INIT: CPT | Mod: HCNC

## 2020-09-12 PROCEDURE — 93005 ELECTROCARDIOGRAM TRACING: CPT | Mod: HCNC

## 2020-09-12 PROCEDURE — 96368 THER/DIAG CONCURRENT INF: CPT | Mod: HCNC

## 2020-09-12 PROCEDURE — 86140 C-REACTIVE PROTEIN: CPT | Mod: HCNC

## 2020-09-12 PROCEDURE — 63600175 PHARM REV CODE 636 W HCPCS: Mod: HCNC | Performed by: EMERGENCY MEDICINE

## 2020-09-12 PROCEDURE — 83615 LACTATE (LD) (LDH) ENZYME: CPT | Mod: HCNC

## 2020-09-12 PROCEDURE — 82728 ASSAY OF FERRITIN: CPT | Mod: HCNC

## 2020-09-12 PROCEDURE — 99900035 HC TECH TIME PER 15 MIN (STAT): Mod: HCNC

## 2020-09-12 PROCEDURE — 83605 ASSAY OF LACTIC ACID: CPT | Mod: HCNC

## 2020-09-12 PROCEDURE — 25000242 PHARM REV CODE 250 ALT 637 W/ HCPCS: Mod: HCNC | Performed by: EMERGENCY MEDICINE

## 2020-09-12 PROCEDURE — 25000003 PHARM REV CODE 250: Mod: HCNC | Performed by: EMERGENCY MEDICINE

## 2020-09-12 PROCEDURE — 84484 ASSAY OF TROPONIN QUANT: CPT | Mod: HCNC

## 2020-09-12 PROCEDURE — 87040 BLOOD CULTURE FOR BACTERIA: CPT | Mod: 59,HCNC

## 2020-09-12 PROCEDURE — 83880 ASSAY OF NATRIURETIC PEPTIDE: CPT | Mod: HCNC

## 2020-09-12 PROCEDURE — 82550 ASSAY OF CK (CPK): CPT | Mod: HCNC

## 2020-09-12 PROCEDURE — 80053 COMPREHEN METABOLIC PANEL: CPT | Mod: HCNC

## 2020-09-12 PROCEDURE — 93010 EKG 12-LEAD: ICD-10-PCS | Mod: HCNC,,, | Performed by: INTERNAL MEDICINE

## 2020-09-12 PROCEDURE — 99291 CRITICAL CARE FIRST HOUR: CPT | Mod: 25,HCNC

## 2020-09-12 PROCEDURE — 94644 CONT INHLJ TX 1ST HOUR: CPT | Mod: HCNC

## 2020-09-12 PROCEDURE — 94660 CPAP INITIATION&MGMT: CPT | Mod: HCNC

## 2020-09-12 PROCEDURE — 27000221 HC OXYGEN, UP TO 24 HOURS: Mod: HCNC

## 2020-09-12 PROCEDURE — 94645 CONT INHLJ TX EACH ADDL HOUR: CPT | Mod: HCNC

## 2020-09-12 RX ORDER — ALBUTEROL SULFATE 2.5 MG/.5ML
10 SOLUTION RESPIRATORY (INHALATION)
Status: COMPLETED | OUTPATIENT
Start: 2020-09-12 | End: 2020-09-12

## 2020-09-12 RX ORDER — IPRATROPIUM BROMIDE 0.5 MG/2.5ML
1.5 SOLUTION RESPIRATORY (INHALATION)
Status: COMPLETED | OUTPATIENT
Start: 2020-09-12 | End: 2020-09-12

## 2020-09-12 RX ADMIN — IPRATROPIUM BROMIDE 1.5 MG: 0.5 SOLUTION RESPIRATORY (INHALATION) at 07:09

## 2020-09-12 RX ADMIN — AZITHROMYCIN 500 MG: 500 INJECTION, POWDER, LYOPHILIZED, FOR SOLUTION INTRAVENOUS at 09:09

## 2020-09-12 RX ADMIN — ALBUTEROL SULFATE 10 MG: 2.5 SOLUTION RESPIRATORY (INHALATION) at 07:09

## 2020-09-12 RX ADMIN — IPRATROPIUM BROMIDE 1.5 MG: 0.5 SOLUTION RESPIRATORY (INHALATION) at 09:09

## 2020-09-12 RX ADMIN — ALBUTEROL SULFATE 10 MG: 2.5 SOLUTION RESPIRATORY (INHALATION) at 09:09

## 2020-09-12 RX ADMIN — CEFTRIAXONE 1 G: 1 INJECTION, SOLUTION INTRAVENOUS at 09:09

## 2020-09-13 PROBLEM — J44.1 COPD EXACERBATION: Status: ACTIVE | Noted: 2020-09-13

## 2020-09-13 PROBLEM — Z71.89 GOALS OF CARE, COUNSELING/DISCUSSION: Status: ACTIVE | Noted: 2020-09-13

## 2020-09-13 LAB
ALLENS TEST: ABNORMAL
ANION GAP SERPL CALC-SCNC: 10 MMOL/L (ref 8–16)
BASOPHILS # BLD AUTO: 0 K/UL (ref 0–0.2)
BASOPHILS NFR BLD: 0 % (ref 0–1.9)
BUN SERPL-MCNC: 28 MG/DL (ref 8–23)
CALCIUM SERPL-MCNC: 9.2 MG/DL (ref 8.7–10.5)
CHLORIDE SERPL-SCNC: 95 MMOL/L (ref 95–110)
CO2 SERPL-SCNC: 39 MMOL/L (ref 23–29)
CREAT SERPL-MCNC: 0.9 MG/DL (ref 0.5–1.4)
DELSYS: ABNORMAL
DIFFERENTIAL METHOD: ABNORMAL
EOSINOPHIL # BLD AUTO: 0 K/UL (ref 0–0.5)
EOSINOPHIL NFR BLD: 0 % (ref 0–8)
EP: 5
ERYTHROCYTE [DISTWIDTH] IN BLOOD BY AUTOMATED COUNT: 15.9 % (ref 11.5–14.5)
ERYTHROCYTE [SEDIMENTATION RATE] IN BLOOD BY WESTERGREN METHOD: 10 MM/H
EST. GFR  (AFRICAN AMERICAN): >60 ML/MIN/1.73 M^2
EST. GFR  (NON AFRICAN AMERICAN): >60 ML/MIN/1.73 M^2
ESTIMATED AVG GLUCOSE: 108 MG/DL (ref 68–131)
FIO2: 40
GLUCOSE SERPL-MCNC: 144 MG/DL (ref 70–110)
HBA1C MFR BLD HPLC: 5.4 % (ref 4–5.6)
HCO3 UR-SCNC: 43.8 MMOL/L (ref 24–28)
HCT VFR BLD AUTO: 41.7 % (ref 40–54)
HGB BLD-MCNC: 12.1 G/DL (ref 14–18)
IMM GRANULOCYTES # BLD AUTO: 0.02 K/UL (ref 0–0.04)
IMM GRANULOCYTES NFR BLD AUTO: 0.4 % (ref 0–0.5)
IP: 20
LYMPHOCYTES # BLD AUTO: 0.3 K/UL (ref 1–4.8)
LYMPHOCYTES NFR BLD: 6.4 % (ref 18–48)
MAGNESIUM SERPL-MCNC: 2 MG/DL (ref 1.6–2.6)
MCH RBC QN AUTO: 27 PG (ref 27–31)
MCHC RBC AUTO-ENTMCNC: 29 G/DL (ref 32–36)
MCV RBC AUTO: 93 FL (ref 82–98)
MIN VOL: 13
MODE: ABNORMAL
MONOCYTES # BLD AUTO: 0.1 K/UL (ref 0.3–1)
MONOCYTES NFR BLD: 2.2 % (ref 4–15)
NEUTROPHILS # BLD AUTO: 4.1 K/UL (ref 1.8–7.7)
NEUTROPHILS NFR BLD: 91 % (ref 38–73)
NRBC BLD-RTO: 0 /100 WBC
PCO2 BLDA: 70.9 MMHG (ref 35–45)
PH SMN: 7.4 [PH] (ref 7.35–7.45)
PHOSPHATE SERPL-MCNC: 2.9 MG/DL (ref 2.7–4.5)
PLATELET # BLD AUTO: 169 K/UL (ref 150–350)
PMV BLD AUTO: 9.6 FL (ref 9.2–12.9)
PO2 BLDA: 52 MMHG (ref 80–100)
POC BE: 15 MMOL/L
POC SATURATED O2: 85 % (ref 95–100)
POC TCO2: 46 MMOL/L (ref 23–27)
POTASSIUM SERPL-SCNC: 4.2 MMOL/L (ref 3.5–5.1)
PROCALCITONIN SERPL IA-MCNC: 0.07 NG/ML
RBC # BLD AUTO: 4.48 M/UL (ref 4.6–6.2)
SAMPLE: ABNORMAL
SITE: ABNORMAL
SODIUM SERPL-SCNC: 144 MMOL/L (ref 136–145)
SP02: 95
SPONT RATE: 20
WBC # BLD AUTO: 4.53 K/UL (ref 3.9–12.7)

## 2020-09-13 PROCEDURE — 25000003 PHARM REV CODE 250: Mod: HCNC | Performed by: HOSPITALIST

## 2020-09-13 PROCEDURE — 20000000 HC ICU ROOM: Mod: HCNC

## 2020-09-13 PROCEDURE — 80048 BASIC METABOLIC PNL TOTAL CA: CPT | Mod: HCNC

## 2020-09-13 PROCEDURE — S4991 NICOTINE PATCH NONLEGEND: HCPCS | Mod: HCNC | Performed by: HOSPITALIST

## 2020-09-13 PROCEDURE — 99291 PR CRITICAL CARE, E/M 30-74 MINUTES: ICD-10-PCS | Mod: HCNC,,, | Performed by: INTERNAL MEDICINE

## 2020-09-13 PROCEDURE — 94660 CPAP INITIATION&MGMT: CPT | Mod: HCNC

## 2020-09-13 PROCEDURE — 99900035 HC TECH TIME PER 15 MIN (STAT): Mod: HCNC

## 2020-09-13 PROCEDURE — 63600175 PHARM REV CODE 636 W HCPCS: Mod: HCNC | Performed by: HOSPITALIST

## 2020-09-13 PROCEDURE — 84145 PROCALCITONIN (PCT): CPT | Mod: HCNC

## 2020-09-13 PROCEDURE — 83036 HEMOGLOBIN GLYCOSYLATED A1C: CPT | Mod: HCNC

## 2020-09-13 PROCEDURE — 94640 AIRWAY INHALATION TREATMENT: CPT | Mod: HCNC

## 2020-09-13 PROCEDURE — 94761 N-INVAS EAR/PLS OXIMETRY MLT: CPT | Mod: HCNC

## 2020-09-13 PROCEDURE — 82803 BLOOD GASES ANY COMBINATION: CPT | Mod: HCNC

## 2020-09-13 PROCEDURE — 85025 COMPLETE CBC W/AUTO DIFF WBC: CPT | Mod: HCNC

## 2020-09-13 PROCEDURE — 63600175 PHARM REV CODE 636 W HCPCS: Mod: HCNC | Performed by: INTERNAL MEDICINE

## 2020-09-13 PROCEDURE — 99291 CRITICAL CARE FIRST HOUR: CPT | Mod: HCNC,,, | Performed by: INTERNAL MEDICINE

## 2020-09-13 PROCEDURE — 25000242 PHARM REV CODE 250 ALT 637 W/ HCPCS: Mod: HCNC | Performed by: HOSPITALIST

## 2020-09-13 PROCEDURE — 27000221 HC OXYGEN, UP TO 24 HOURS: Mod: HCNC

## 2020-09-13 PROCEDURE — 83735 ASSAY OF MAGNESIUM: CPT | Mod: HCNC

## 2020-09-13 PROCEDURE — 84100 ASSAY OF PHOSPHORUS: CPT | Mod: HCNC

## 2020-09-13 RX ORDER — CLONIDINE HYDROCHLORIDE 0.1 MG/1
0.1 TABLET ORAL EVERY 6 HOURS PRN
Status: DISCONTINUED | OUTPATIENT
Start: 2020-09-13 | End: 2020-09-16 | Stop reason: HOSPADM

## 2020-09-13 RX ORDER — POLYETHYLENE GLYCOL 3350 17 G/17G
17 POWDER, FOR SOLUTION ORAL DAILY
Status: DISCONTINUED | OUTPATIENT
Start: 2020-09-13 | End: 2020-09-16 | Stop reason: HOSPADM

## 2020-09-13 RX ORDER — ENOXAPARIN SODIUM 100 MG/ML
40 INJECTION SUBCUTANEOUS EVERY 24 HOURS
Status: DISCONTINUED | OUTPATIENT
Start: 2020-09-13 | End: 2020-09-16 | Stop reason: HOSPADM

## 2020-09-13 RX ORDER — IBUPROFEN 200 MG
1 TABLET ORAL DAILY
Status: DISCONTINUED | OUTPATIENT
Start: 2020-09-13 | End: 2020-09-16 | Stop reason: HOSPADM

## 2020-09-13 RX ORDER — BISACODYL 10 MG
10 SUPPOSITORY, RECTAL RECTAL DAILY PRN
Status: DISCONTINUED | OUTPATIENT
Start: 2020-09-13 | End: 2020-09-16 | Stop reason: HOSPADM

## 2020-09-13 RX ORDER — METHYLPREDNISOLONE SOD SUCC 125 MG
80 VIAL (EA) INJECTION EVERY 8 HOURS
Status: DISCONTINUED | OUTPATIENT
Start: 2020-09-13 | End: 2020-09-15

## 2020-09-13 RX ORDER — AMOXICILLIN 250 MG
1 CAPSULE ORAL 2 TIMES DAILY
Status: DISCONTINUED | OUTPATIENT
Start: 2020-09-13 | End: 2020-09-16 | Stop reason: HOSPADM

## 2020-09-13 RX ORDER — METOPROLOL TARTRATE 1 MG/ML
5 INJECTION, SOLUTION INTRAVENOUS EVERY 5 MIN PRN
Status: DISCONTINUED | OUTPATIENT
Start: 2020-09-13 | End: 2020-09-16 | Stop reason: HOSPADM

## 2020-09-13 RX ORDER — FUROSEMIDE 10 MG/ML
40 INJECTION INTRAMUSCULAR; INTRAVENOUS ONCE
Status: COMPLETED | OUTPATIENT
Start: 2020-09-13 | End: 2020-09-13

## 2020-09-13 RX ORDER — ACETAMINOPHEN 325 MG/1
650 TABLET ORAL EVERY 4 HOURS PRN
Status: DISCONTINUED | OUTPATIENT
Start: 2020-09-13 | End: 2020-09-16 | Stop reason: HOSPADM

## 2020-09-13 RX ORDER — SODIUM CHLORIDE 0.9 % (FLUSH) 0.9 %
3 SYRINGE (ML) INJECTION
Status: DISCONTINUED | OUTPATIENT
Start: 2020-09-13 | End: 2020-09-16 | Stop reason: HOSPADM

## 2020-09-13 RX ORDER — LEVOFLOXACIN 5 MG/ML
750 INJECTION, SOLUTION INTRAVENOUS
Status: DISCONTINUED | OUTPATIENT
Start: 2020-09-13 | End: 2020-09-16 | Stop reason: HOSPADM

## 2020-09-13 RX ORDER — IPRATROPIUM BROMIDE AND ALBUTEROL SULFATE 2.5; .5 MG/3ML; MG/3ML
3 SOLUTION RESPIRATORY (INHALATION) EVERY 6 HOURS
Status: DISCONTINUED | OUTPATIENT
Start: 2020-09-13 | End: 2020-09-16 | Stop reason: HOSPADM

## 2020-09-13 RX ORDER — FAMOTIDINE 20 MG/1
20 TABLET, FILM COATED ORAL 2 TIMES DAILY
Status: DISCONTINUED | OUTPATIENT
Start: 2020-09-13 | End: 2020-09-16 | Stop reason: HOSPADM

## 2020-09-13 RX ORDER — FLUTICASONE FUROATE AND VILANTEROL 100; 25 UG/1; UG/1
1 POWDER RESPIRATORY (INHALATION) DAILY
Status: DISCONTINUED | OUTPATIENT
Start: 2020-09-13 | End: 2020-09-16 | Stop reason: HOSPADM

## 2020-09-13 RX ORDER — AMLODIPINE BESYLATE 5 MG/1
10 TABLET ORAL DAILY
Status: DISCONTINUED | OUTPATIENT
Start: 2020-09-13 | End: 2020-09-16 | Stop reason: HOSPADM

## 2020-09-13 RX ADMIN — METHYLPREDNISOLONE SODIUM SUCCINATE 80 MG: 125 INJECTION, POWDER, FOR SOLUTION INTRAMUSCULAR; INTRAVENOUS at 01:09

## 2020-09-13 RX ADMIN — FLUTICASONE FUROATE AND VILANTEROL TRIFENATATE 1 PUFF: 100; 25 POWDER RESPIRATORY (INHALATION) at 01:09

## 2020-09-13 RX ADMIN — ENOXAPARIN SODIUM 40 MG: 40 INJECTION SUBCUTANEOUS at 04:09

## 2020-09-13 RX ADMIN — NICOTINE 1 PATCH: 21 PATCH, EXTENDED RELEASE TRANSDERMAL at 09:09

## 2020-09-13 RX ADMIN — FUROSEMIDE 40 MG: 10 INJECTION, SOLUTION INTRAVENOUS at 01:09

## 2020-09-13 RX ADMIN — DOCUSATE SODIUM 50 MG AND SENNOSIDES 8.6 MG 1 TABLET: 8.6; 5 TABLET, FILM COATED ORAL at 09:09

## 2020-09-13 RX ADMIN — AMLODIPINE BESYLATE 10 MG: 5 TABLET ORAL at 09:09

## 2020-09-13 RX ADMIN — IPRATROPIUM BROMIDE AND ALBUTEROL SULFATE 3 ML: .5; 3 SOLUTION RESPIRATORY (INHALATION) at 01:09

## 2020-09-13 RX ADMIN — POLYETHYLENE GLYCOL 3350 17 G: 17 POWDER, FOR SOLUTION ORAL at 09:09

## 2020-09-13 RX ADMIN — FAMOTIDINE 20 MG: 20 TABLET ORAL at 09:09

## 2020-09-13 RX ADMIN — METHYLPREDNISOLONE SODIUM SUCCINATE 80 MG: 125 INJECTION, POWDER, FOR SOLUTION INTRAMUSCULAR; INTRAVENOUS at 09:09

## 2020-09-13 RX ADMIN — METHYLPREDNISOLONE SODIUM SUCCINATE 80 MG: 125 INJECTION, POWDER, FOR SOLUTION INTRAMUSCULAR; INTRAVENOUS at 05:09

## 2020-09-13 RX ADMIN — IPRATROPIUM BROMIDE AND ALBUTEROL SULFATE 3 ML: .5; 3 SOLUTION RESPIRATORY (INHALATION) at 07:09

## 2020-09-13 RX ADMIN — LEVOFLOXACIN 750 MG: 750 INJECTION, SOLUTION INTRAVENOUS at 04:09

## 2020-09-13 NOTE — ED NOTES
Pt starting to C/O L sided CP. Pt noted to have decreasing 02 sats after breathing txs complete. EKG done and shown to MD. 100% NRB started as ordered. Sp02 up to 96% on NRB. Meds given as ordered. Will continue to closely monitor.,

## 2020-09-13 NOTE — ED NOTES
No s/s of distress. No current C/O pain. VSS. C/o ongoing SOB/cough worsening over last 4 days. Pt notes some blood in phlegm over last 4 days. Pt denies CP/N/V. Will continue to closely monitor.

## 2020-09-13 NOTE — CARE UPDATE
Deterioration Alert Received:    False alarm. Patient still in ICU and is being treated.     GENNY Sandra MD

## 2020-09-13 NOTE — ED NOTES
Pt was going to go up stairs but pts room in ICU was taken by another pt. Pt notified. Awaiting further orders

## 2020-09-13 NOTE — ED NOTES
2nd IV started with blood drawn. VSS. No new complaints. Pt feels more comfortable on  Breathing tx. Will continue to closely monitor.

## 2020-09-13 NOTE — H&P
[facility] Hospital Medicine  History & Physical    Patient Name: Enzo Wilson  MRN: [id number]  Admission Date: 09/12/2020  Attending Physician: Wellington Aldana MD, MPH      PCP:     Lewis Self MD    CC:     Chief Complaint   Patient presents with    Cough     pt comes in via EMS from home with c/o productive cough x2 days. He states he felt too bad to do his home nebs. EMS reports crackles bilaterally with 80% SpO2 on his home 3L. He received one duoneb and 125mg IV solumedrol per EMS and improved to 100% on 3L. hx of COPD       HISTORY OF PRESENT ILLNESS:     Enzo Wilson is a 80 y.o. male that (in part)  has a past medical history of CHF (congestive heart failure), COPD (chronic obstructive pulmonary disease), Hypertension, Respiratory distress, Squamous cell cancer of epiglottis, and Vocal cord polyps.  has a past surgical history that includes Tonsillectomy and Excision of mass of back (N/A, 6/10/2019). Presents to [facility] Emergency Department complaining of cough associated with shortness of breath.  Subacute onset 2 days ago with progressive worsening.  Worsened with ambulation and minimal exertion.  Minimally relieved with rest.  On home oxygen with nasal cannula at 3 L. was given DuoNebs and supplemental oxygen by EMS with initial improvement in oxygenation followed by a subsequent decline again once he was in the emergency department.  Denies recent travel or sick contacts.  No COVID-19 exposure.  Smokes cigarettes.  History of COPD.  Reports compliance with home medication regimen.  Denies hemoptysis.  Positive for wheezing.  Denies stridor or night sweats.  Has dyspnea with exertion.  Moderate to severe intensity.  Daily frequency.  Constant duration.    In the emergency department routine laboratory studies, chest x-ray, EKG, cardiac enzymes were obtained.  Unable to maintain oxygenation with nasal cannula.  Non-rebreather given for short  time.  ABG performed which was concerning for pH of 7.2 and a pCO2 of 99.1, and PO2 of 62.  Was given empiric antibiotics, DuoNeb treatment, steroids, and started on BiPAP.  Being admitted for acute on chronic hypoxemic and hypercapnic respiratory failure secondary to COPD exacerbation.    Hospital medicine has been asked to admit to inpatient for further evaluation and treatment.       REVIEW OF SYSTEMS:     -- Constitutional: No fever or chills.  -- Eyes: No visual changes, diplopia, pain, tearing, blind spots, or discharge.   -- Ears, nose, mouth, throat, and face: No congestion, sore throat, epistaxis, d/c, bleeding gums, neck stiffness masses, or dental issues.  -- Respiratory:  As above in HPI.  -- Cardiovascular:  Dyspnea with exertion.  No chest pain,syncope, PND, edema, cyanosis, or palpitations.   -- Gastrointestinal: No vomiting, abdominal pain, hematemesis, melena, dyspepsia, or change in bowel habits.  -- Genitourinary: No hematuria, dysuria, frequency, urgency, nocturia, polyuria, stones, or incontinence.  -- Integument/breast: No rash, pruritis, pigmentation changes, dryness, or changes in hair.  -- Hematologic/lymphatic: No easy bruising or lymphadenopathy.   -- Musculoskeletal:  Chronic arthralgias.  No acute arthralgias, acute myalgias, joint swelling, acute limitations of ROM, or acute muscular weakness.  -- Neurological: No seizures, headaches, incoordination, paraesthesias, ataxia, vertigo, or tremors.  -- Behavioral/Psych: No auditory or visual hallucinations, depression, or suicidal/homicidal ideations.  -- Endocrine: No heat or cold intolerance, polydipsia, or unintentional weight gain / loss.  -- Allergy/Immunologic: No recurrent infections or adverse reaction to food, insects, or difficulty breathing.      PAST MEDICAL / SURGICAL HISTORY:     Past Medical History:   Diagnosis Date    CHF (congestive heart failure)     COPD (chronic obstructive pulmonary disease)     confirmed by PFT's  3/2007; oxygen dependent    Hypertension     Respiratory distress 10/28/2015    Squamous cell cancer of epiglottis 4/21/2014    Vocal cord polyps      Past Surgical History:   Procedure Laterality Date    EXCISION OF MASS OF BACK N/A 6/10/2019    Procedure: EXCISION, MASS, BACK;  Surgeon: Caenlo Lockwood MD;  Location: [facility] OR;  Service: General;  Laterality: N/A;    TONSILLECTOMY           FAMILY HISTORY:     Family History   Problem Relation Age of Onset    Heart disease Father     Hypertension Father     Hypertension Son     Heart disease Maternal Uncle     Hypertension Maternal Grandmother     Hypertension Maternal Grandfather     Kidney disease Mother          SOCIAL HISTORY:     Social History     Socioeconomic History    Marital status: Single     Spouse name: Not on file    Number of children: Not on file    Years of education: Not on file    Highest education level: Not on file   Occupational History    Not on file   Social Needs    Financial resource strain: Not on file    Food insecurity     Worry: Not on file     Inability: Not on file    Transportation needs     Medical: Not on file     Non-medical: Not on file   Tobacco Use    Smoking status: Current Some Day Smoker     Packs/day: 1.00     Years: 55.00     Pack years: 55.00    Smokeless tobacco: Never Used    Tobacco comment: has been weaning down   Substance and Sexual Activity    Alcohol use: Yes     Alcohol/week: 6.0 standard drinks     Types: 6 Cans of beer per week     Comment: 1-2  DRINKS A WEEK    Drug use: No    Sexual activity: Not Currently   Lifestyle    Physical activity     Days per week: Not on file     Minutes per session: Not on file    Stress: Not on file   Relationships    Social connections     Talks on phone: Not on file     Gets together: Not on file     Attends [employer] service: Not on file     Active member of club or organization: Not on file     Attends meetings of clubs or organizations: Not on  file     Relationship status: Not on file   Other Topics Concern    Not on file   Social History Narrative    Not on file         ALLERGIES:       Review of patient's allergies indicates:  No Known Allergies      HEALTH SCREENING:     Prevnar 13 pneumonia vaccine = no evidence of previous vaccination found in the medical record      HOME MEDICATIONS:     Prior to Admission medications    Medication Sig Start Date End Date Taking? Authorizing Provider   albuterol (PROVENTIL/VENTOLIN HFA) 90 mcg/actuation inhaler Inhale 1-2 puffs into the lungs every 6 (six) hours as needed for Wheezing. 11/26/18   Lewis Self MD   albuterol-ipratropium (DUO-NEB) 2.5 mg-0.5 mg/3 mL nebulizer solution USE 3 ML VIA NEBULIZER EVERY 6 HOURS AS NEEDED FOR WHEEZING OR SHORTNESS OF BREATH 12/2/19   Lewis Self MD   amLODIPine (NORVASC) 10 MG tablet Take 1 tablet (10 mg total) by mouth once daily. 12/2/19   Lewis Self MD   fluticasone-salmeterol diskus inhaler 250-50 mcg Inhale 1 puff into the lungs 2 (two) times daily. 12/2/19   Lewis Self MD   oxyCODONE-acetaminophen (PERCOCET) 5-325 mg per tablet Take 1 tablet by mouth every 4 (four) hours as needed for Pain.  Patient not taking: Reported on 10/15/2019 6/10/19   Canelo Lockwood MD          [facility] MEDICATIONS:     Scheduled Meds:    albuterol sulfate  10 mg Nebulization ED 1 Time    azithromycin  500 mg Intravenous ED 1 Time    ipratropium  1.5 mg Nebulization ED 1 Time     Continuous Infusions:   PRN Meds:       PHYSICAL EXAM:     Wt Readings from Last 1 Encounters:   09/12/20 1800 54.4 kg (120 lb)     Body mass index is 15 kg/m².  Vitals:    09/12/20 2102 09/12/20 2107 09/12/20 2131 09/12/20 2134   BP: 122/73 123/78 130/78    BP Location:  Right arm     Patient Position:  Lying     Pulse: 90 84 86 84   Resp: (!) 34 (!) 26 (!) 23 (!) 23   Temp:       TempSrc:       SpO2: (!) 85% 95% 100% 100%   Weight:       Height:              -- General appearance:   Chronically ill-appearing elderly male who is lying in bed.  No apparent distress.  well developed. appears stated age   -- Head: normocephalic, atraumatic   -- Eyes: conjunctivae clear. Extraocular muscles intact  -- Nose: Nares normal. Septum midline.   -- Mouth/Throat:  BiPAP in place.  Dry oral mucosa. no throat erythema.   -- Neck: NoJVD,  Supple, symmetrical, trachea midline, thyroid not grossly enlarged, appears symmetric  -- Lungs:  Decreased breath sounds to auscultation bilaterally with prolonged expiratory phase and poor air excursion consistent with long-term smoker.  Increased respiratory rate and minimally increased respiratory effort.  Speaking in short sentences.. No use of accessory muscles.   -- Chest wall: no tenderness. equal bilateral chest rise   -- Heart: regular rate and regular rhythm. S1, S2 normal.  no click, rub or gallop   -- Abdomen: soft, non-tender, non-distended, non-tympanic; bowel sounds normal; no masses  -- Extremities: no cyanosis, clubbing or edema.   -- Pulses: 2+ and symmetric   -- Skin:  Turgor normal. Color normal. Texture normal. No rashes or lesions.   -- Neurologic: Normal strength and tone. No focal numbness or weakness. CNII-XII intact. Carol coma scale: eyes open spontaneously-4, oriented & converses-5, obeys commands-6.      LABORATORY STUDIES:     Recent Results (from the past 36 hour(s))   CBC auto differential    Collection Time: 09/12/20  6:53 PM   Result Value Ref Range    WBC 6.24 3.90 - 12.70 K/uL    RBC 4.61 4.60 - 6.20 M/uL    Hemoglobin 12.4 (L) 14.0 - 18.0 g/dL    Hematocrit 43.7 40.0 - 54.0 %    Mean Corpuscular Volume 95 82 - 98 fL    Mean Corpuscular Hemoglobin 26.9 (L) 27.0 - 31.0 pg    Mean Corpuscular Hemoglobin Conc 28.4 (L) 32.0 - 36.0 g/dL    RDW 15.9 (H) 11.5 - 14.5 %    Platelets 154 150 - 350 K/uL    MPV 9.0 (L) 9.2 - 12.9 fL    Immature Granulocytes 0.6 (H) 0.0 - 0.5 %    Gran # (ANC) 4.6 1.8 - 7.7 K/uL    Immature Grans (Abs) 0.04 0.00 -  0.04 K/uL    Lymph # 1.1 1.0 - 4.8 K/uL    Mono # 0.4 0.3 - 1.0 K/uL    Eos # 0.1 0.0 - 0.5 K/uL    Baso # 0.02 0.00 - 0.20 K/uL    nRBC 0 0 /100 WBC    Gran% 73.5 (H) 38.0 - 73.0 %    Lymph% 17.8 (L) 18.0 - 48.0 %    Mono% 6.7 4.0 - 15.0 %    Eosinophil% 1.1 0.0 - 8.0 %    Basophil% 0.3 0.0 - 1.9 %    Differential Method Automated    Comprehensive metabolic panel    Collection Time: 09/12/20  6:53 PM   Result Value Ref Range    Sodium 147 (H) 136 - 145 mmol/L    Potassium 4.2 3.5 - 5.1 mmol/L    Chloride 97 95 - 110 mmol/L    CO2 39 (H) 23 - 29 mmol/L    Glucose 103 70 - 110 mg/dL    BUN, Bld 27 (H) 8 - 23 mg/dL    Creatinine 0.9 0.5 - 1.4 mg/dL    Calcium 9.4 8.7 - 10.5 mg/dL    Total Protein 7.4 6.0 - 8.4 g/dL    Albumin 3.3 (L) 3.5 - 5.2 g/dL    Total Bilirubin 0.5 0.1 - 1.0 mg/dL    Alkaline Phosphatase 52 (L) 55 - 135 U/L    AST 28 10 - 40 U/L    ALT 11 10 - 44 U/L    Anion Gap 11 8 - 16 mmol/L    eGFR if African American >60 >60 mL/min/1.73 m^2    eGFR if non African American >60 >60 mL/min/1.73 m^2   Brain natriuretic peptide    Collection Time: 09/12/20  6:53 PM   Result Value Ref Range     (H) 0 - 99 pg/mL   Troponin I    Collection Time: 09/12/20  6:53 PM   Result Value Ref Range    Troponin I 0.020 0.000 - 0.026 ng/mL   POCT COVID-19 Rapid Screening    Collection Time: 09/12/20  7:39 PM   Result Value Ref Range    POC Rapid COVID Negative Negative     Acceptable Yes    Lactic acid, plasma    Collection Time: 09/12/20  7:56 PM   Result Value Ref Range    Lactate (Lactic Acid) 0.7 0.5 - 2.2 mmol/L   Lactate Dehydrogenase    Collection Time: 09/12/20  7:56 PM   Result Value Ref Range     110 - 260 U/L   CK    Collection Time: 09/12/20  7:56 PM   Result Value Ref Range     20 - 200 U/L   ISTAT PROCEDURE    Collection Time: 09/12/20  9:19 PM   Result Value Ref Range    POC PH 7.267 (LL) 7.35 - 7.45    POC PCO2 99.1 (HH) 35 - 45 mmHg    POC PO2 62 (L) 80 - 100 mmHg    POC  HCO3 45.2 (H) 24 - 28 mmol/L    POC BE 13 -2 to 2 mmol/L    POC SATURATED O2 85 (L) 95 - 100 %    POC TCO2 48 (H) 23 - 27 mmol/L    Sample ARTERIAL     Site RR     Allens Test Pass     DelSys NRB     Mode SPONT     Flow 15        Lab Results   Component Value Date    INR 1.0 12/20/2016    INR 1.0 02/11/2016    INR 0.9 10/28/2015     Lab Results   Component Value Date    HGBA1C 5.3 05/01/2019     No results for input(s): POCTGLUCOSE in the last 72 hours.        MICROBIOLOGY DATA:     Urine Culture, Routine   Date Value Ref Range Status   05/01/2019   Final    Multiple organisms isolated. None in predominance.  Repeat if   05/01/2019 clinically necessary.  Final   02/11/2016 No growth  Final   10/21/2014 No growth  Final     AFB Culture & Smear   Date Value Ref Range Status   02/18/2016   Final    Results called to and read back by:Araceli Cota 03/11/2016  11:53   02/18/2016   Final    MYCOBACTERIUM AVIUM INTRACELLULARE COMPLEX  [employer]         Microbiology x 7d:   Microbiology Results (last 7 days)     Procedure Component Value Units Date/Time    Blood Culture #1 **CANNOT BE ORDERED STAT** [[id number]] Collected: 09/12/20 1956    Order Status: Sent Specimen: Blood from Peripheral, Hand, Right Updated: 09/12/20 2027    Blood Culture #2 **CANNOT BE ORDERED STAT** [[id number]] Collected: 09/12/20 1930    Order Status: Sent Specimen: Blood from Peripheral, Hand, Left Updated: 09/12/20 2027            IMAGING:     Imaging Results          X-Ray Chest AP Portable (Final result)  Result time 09/12/20 19:14:00    Final result by Arjun Spencer MD (09/12/20 19:14:00)                 Impression:      1. Mild cardiomegaly.  2. Bilateral interstitial parenchymal airspace disease in upper and lower lobes minimally increased from the prior study could represent chronic scarring or superimposed infiltrates.  Probable small pleural effusion on the right also.  Recommend follow-up.  3. COPD with emphysematous  changes.      Electronically signed by: Arjun Pittman  Date:    09/12/2020  Time:    19:14             Narrative:    EXAMINATION:  XR CHEST AP PORTABLE    CLINICAL HISTORY:  shortness of breath;    TECHNIQUE:  Single frontal view of the chest was performed.    COMPARISON:  10/18/2017    FINDINGS:  Cardiac silhouette is mildly enlarged.    Lungs appear mildly hyperexpanded with prominence of the interstitial changes likely associated with COPD.    Mild bibasilar atelectasis or infiltrate.  Probable small effusion on the right.  Mild upper lobe interstitial and alveolar changes bilaterally may be associated with mild infiltrate or scarring.    The bilateral interstitial changes are minimally increased from the prior study.    Probable emphysematous bleb formation at the left lung apex and anteriorly on the right in the right upper lobe also.    No evidence of pneumothorax.                                  CONSULTS:     IP CONSULT TO PULMONOLOGY       ASSESSMENT & PLAN:     Primary Diagnosis:  Acute on chronic respiratory failure with hypoxia and hypercapnia    Active Hospital Problems    Diagnosis  POA    *Acute on chronic respiratory failure with hypoxia and hypercapnia [J96.21, J96.22]  Yes     Priority: 1 - High     PH of 7.2.  PCO2 of 99      Chronic respiratory failure [J96.10]  Yes     Priority: 2      Chronic    COPD (chronic obstructive pulmonary disease) [J44.9]  Yes     Priority: 3      Chronic    Tobacco abuse [Z72.0]  Yes     Chronic    Pulmonary artery hypertension [I27.21]  Yes     Chronic    Essential hypertension [I10]  Yes     Chronic      Resolved Hospital Problems   No resolved problems to display.       Acute on chronic hypercapnic and hypoxemic respiratory failure secondary COPD exacerbation  · Scheduled nebulizer treatments (albuterol/atrovent)  · Initiate Solumedrol 125mg IV q8, then taper as clinical course improves; convert to PO taper as outpatient  · PPI or H2 blocker concomitantly  with steroids  · Unable to maintain saturation with supplemental oxygen by nasal cannula.  Initiated on BiPAP due to a pH of 7.2, pCO2 of 99.3  · Titrate O2 sats between 88 to 93%.  No supplemental 02 for 02 saturation greater than 93% due to V/Q mismatch  · Breo, or similar, while inpatient  · Empiric antibiotics  · Add budesonide/formoterol or salmeterol/fluticasone propionate - at or before discharge - (Advair 500/50mg, Spiriva 18mcg, or Daliresp 500mcg)   · Mucolytics  · Consult pulmonology for further optimization  · Tobacco cessation counseling    Tobacco abuse  · Chronic tobacco use  · Tobacco cessation counseling  · Nicotine patch offered; consider Wellbutrin or Chantix through PCP as an outpatient (will require closer monitoring)  · I discussed with the patient regarding the hazardous effects of smoking on increasing risk of heart attack and stroke, worsening lung functions, and increasing cancer risk.   Patient was urged to stop smoking now.  I also offered nicotine taper (such as nicotine patch and gum) to help ease the craving to smoke.    Essential Hypertension  · Goal while inpatient is a systolic blood pressure less than 160mmHg  · BP in acceptable range at this time  · Continue current home regimen with hold parameters  · PRN antihypertensives available    Compensated CHF   · Evidenced by history,   · No evidence of pulmonary edema, peripheral edema  ·   · Provide diuresis w/ by mouth medication  · Maintain w/ beta-blocker  · ACE inhibitor or ARB if GFR allows and remains stable  · Daily Weights  · Strict I/O  · Low-sodium cardiac diet  · Chest X-ray  · Check TSH, albumin, UA, and renal function  · Obtain 2D echo if <6 months   No results found for: EF  · EKG and cardiac enzymes PRN  · DVT prophylaxis w/ pharmacological and/or mechanical measures  · Oxygen supplementation support PRN    Instructions given to patient/family:  Monitor daily weight.  Regular activity within patient's  limitations.  Low salt, low fat and low choleterol diet and restrict fluid < 2L per day.  Call MD if SOB, chest pain, weight gain > 2-3 lbs per day and/or 5-6 lbs per week.   No smoking. Annual influenza vaccine required.          VTE Risk Mitigation (From admission, onward)    None       Lovenox ordered      Adult PRN medications available   DVT prophylaxis given       DISPOSITION:     Will admit to the Hospital Medicine service for further evaluation and treatment.    Chart reviewed and updated where applicable.    High Risk Conditions:  Patient has a condition that poses threat to life and bodily function:  Acute hypoxemic and hypercapnic respiratory failure      ===============================================================    Wellington Aldana MD, MPH  Department of Hospital Medicine   [facility]  [phone] pg  (7pm - 6am)          This note is dictated using Colingo voice recognition software.  There are word recognition mistakes that are occasionally missed on review.

## 2020-09-13 NOTE — CARE UPDATE
[facility]  ICU Shift Summary  Date: 9/13/2020      COVID Test: (--)  Isolation: No active isolations     Prehospitalization: Home  Admit Date / LOS : 9/12/2020/ 1 days    Diagnosis: Acute on chronic respiratory failure with hypoxia and hypercapnia    Consults:        Active: Pulm CC       Needed: N/A     Code Status: Full Code   Advanced Directive: <no information>    LDA: PIV       Central Lines/Site/Justification:Patient Does Not Have Central Line       Urinary Cath/Order/Justification:Patient Does Not Have Urinary Catheter    Vasopressors/Infusions:        GOALS: Volume/ Hemodynamic: N/A                     RASS: 0  alert and calm    Pain Management: none       Pain Controlled: not applicable     Rhythm: NSR    Respiratory Device: Nasal Cannula    Oxygen Concentration (%):  [] 40             Most Recent SBT/ SAT: Pass       MOVE Screen: PASS    VTE Prophylaxis: Pharm  Mobility: Bedrest  Stress Ulcer Prophylaxis: Yes    Dietary: PO  Tolerance: yes  /  Advancement: @ goal    I & O (24h):    Intake/Output Summary (Last 24 hours) at 9/13/2020 1828  Last data filed at 9/13/2020 1800  Gross per 24 hour   Intake 600 ml   Output 600 ml   Net 0 ml        Restraints: No    Significant Dates:  Post Op Date: N/A  Rescue Date: N/A  Imaging/ Diagnostics: N/A    Noteworthy Labs:  none    CBC/Anemia Labs: Coags:    Recent Labs   Lab 09/12/20 1853 09/12/20 1956 09/13/20  0537   WBC 6.24  --  4.53   HGB 12.4*  --  12.1*   HCT 43.7  --  41.7     --  169   MCV 95  --  93   RDW 15.9*  --  15.9*   FERRITIN  --  207  --     No results for input(s): PT, INR, APTT in the last 168 hours.     Chemistries:   Recent Labs   Lab 09/12/20 1853 09/13/20  0537   * 144   K 4.2 4.2   CL 97 95   CO2 39* 39*   BUN 27* 28*   CREATININE 0.9 0.9   CALCIUM 9.4 9.2   PROT 7.4  --    BILITOT 0.5  --    ALKPHOS 52*  --    ALT 11  --    AST 28  --    MG  --  2.0   PHOS  --  2.9        Cardiac Enzymes: Ejection  Fractions:    Recent Labs     09/12/20  1853 09/12/20 1956   CPK  --  105   TROPONINI 0.020  --     No results found for: EF     POCT Glucose: HbA1c:    No results for input(s): POCTGLUCOSE in the last 168 hours. Hemoglobin A1C   Date Value Ref Range Status   09/13/2020 5.4 4.0 - 5.6 % Final     Comment:     ADA Screening Guidelines:  5.7-6.4%  Consistent with prediabetes  >or=6.5%  Consistent with diabetes  High levels of fetal hemoglobin interfere with the HbA1C  assay. Heterozygous hemoglobin variants (HbS, HgC, etc)do  not significantly interfere with this assay.   However, presence of multiple variants may affect accuracy.             ICU LOS 7h  Level of Care: OK to Transfer    Shift Summary/Plan for the shift: none

## 2020-09-13 NOTE — PLAN OF CARE
09/13/20 0836   Discharge Assessment   Assessment Type Discharge Planning Assessment   Confirmed/corrected address and phone number on facesheet? Yes   Assessment information obtained from? Patient;Medical Record   Communicated expected length of stay with patient/caregiver no   Prior to hospitilization cognitive status: Alert/Oriented   Prior to hospitalization functional status: Independent;Assistive Equipment  (Nebulizer; O2)   Current cognitive status: Alert/Oriented   Current Functional Status: Independent;Assistive Equipment  (Nebulizer; O2)   Facility Arrived From: Home   Lives With sibling(s);other relative(s)   Able to Return to Prior Arrangements yes   Is patient able to care for self after discharge? Yes   Who are your caregiver(s) and their phone number(s)? Elisabeth-sister: [phone]; [phone]   Patient's perception of discharge disposition home or selfcare   Readmission Within the Last 30 Days no previous admission in last 30 days   Patient currently being followed by outpatient case management? No   Patient currently receives any other outside agency services? No   Equipment Currently Used at Home nebulizer;oxygen   Do you have any problems affording any of your prescribed medications? No   Is the patient taking medications as prescribed? yes   Does the patient have transportation home? Yes   Transportation Anticipated car, drives self;family or friend will provide   Does the patient receive services at the [facility]? No   Discharge Plan A Home with family   Discharge Plan B Other  (TBD)   DME Needed Upon Discharge  other (see comments)  (TBD)   Patient/Family in Agreement with Plan yes   SW Role explained to patient; two patient identifiers recognized; SW contact information placed on Communication board. Discussed patient managing health care at home; determined who would be helping patient at home with recovery: Sister, niece, and other family members will help with recovery.    PCP: Lewis MARTINEZ  MD Clair  No preference for appointment times    Extended Emergency Contact Information  Primary Emergency Contact: Jocelyn Pedro   [facility]  Home Phone: [phone]  Mobile Phone: [phone]  Relation: Daughter  Secondary Emergency Contact: Elisabeth Camacho  Address: [address] [place] [address] .           [place] [address] [facility]  Home Phone: [phone]  Mobile Phone: [phone]  Relation: Sister     [id number] - [place] - [address] AT [facility] & [place] [address] [facility]  [place] [address]  Phone: [phone] Fax: [phone]    [id number] - [place] - [address]  [address] [facility]  [place] [address]  Phone: [phone] Fax: [phone]     Payor: HU[employer] MANAGED MEDICARE / Plan: HU[employer] SNP (SPECIAL NEEDS PLAN) / Product Type: Medicare Advantage /

## 2020-09-13 NOTE — PLAN OF CARE
Pt has been using both Bipap and O2 at 4-5l/nc. Pt denies CP and SOB. Tachypnea noted at times with RR 30's-40. Pt has been in NSR thru out day. He has remained safe and free of injury today.

## 2020-09-13 NOTE — SUBJECTIVE & OBJECTIVE
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    Vocal cord polyps        Past Surgical History:   Procedure Laterality Date    EXCISION OF MASS OF BACK N/A 6/10/2019    Procedure: EXCISION, MASS, BACK;  Surgeon: Canelo Lockwood MD;  Location: [facility];  Service: General;  Laterality: N/A;    TONSILLECTOMY         Review of patient's allergies indicates:  No Known Allergies    Family History     Problem Relation (Age of Onset)    Heart disease Father, Maternal Uncle    Hypertension Father, Son, Maternal Grandmother, Maternal Grandfather    Kidney disease Mother        Tobacco Use    Smoking status: Current Some Day Smoker     Packs/day: 1.00     Years: 55.00     Pack years: 55.00    Smokeless tobacco: Never Used    Tobacco comment: has been weaning down   Substance and Sexual Activity    Alcohol use: Yes     Alcohol/week: 6.0 standard drinks     Types: 6 Cans of beer per week     Comment: 1-2  DRINKS A WEEK    Drug use: No    Sexual activity: Not Currently         Review of Systems   Constitutional:         no weight loss, no fever, no night sweat   HENT:        No sore throat, normal hearing acuity   Eyes:        No visual disturbance   Respiratory: Negative for apnea and choking.         Pulmonary related symptoms as per HPI.   Cardiovascular:        No chest pain, no orthopnea, no PND   Gastrointestinal:        No melena, no hematochezia, no diarhea, no constipation.   Endocrine:        No heat or cold intolerance.   Genitourinary:        No dysuria, no hematuria, no hesistancy, no dribbling   Skin:        No rash   Neurological:        No syncope, no vertigo, no tinitus   Psychiatric/Behavioral:        No homocide or suicide ideation; no depression.     Objective:     Vital  Signs (Most Recent):  Temp: 97.5 °F (36.4 °C) (09/13/20 0701)  Pulse: 98 (09/13/20 1100)  Resp: (!) 28 (09/13/20 1100)  BP: 124/73 (09/13/20 1100)  SpO2: (!) 94 % (09/13/20 1100) Vital Signs (24h Range):  Temp:  [97.5 °F (36.4 °C)-98.7 °F (37.1 °C)] 97.5 °F (36.4 °C)  Pulse:  [] 98  Resp:  [16-40] 28  SpO2:  [81 %-100 %] 94 %  BP: (108-172)/() 124/73     Weight: 54.4 kg (120 lb)  Body mass index is 15 kg/m².      Intake/Output Summary (Last 24 hours) at 9/13/2020 1157  Last data filed at 9/13/2020 1000  Gross per 24 hour   Intake 240 ml   Output 175 ml   Net 65 ml       Physical Exam  Constitutional:       Appearance: He is underweight. He is ill-appearing.   HENT:      Head: Normocephalic and atraumatic.      Nose: Nose normal.      Mouth/Throat:      Comments: Poor dentition  Eyes:      Extraocular Movements: Extraocular movements intact.      Pupils: Pupils are equal, round, and reactive to light.   Cardiovascular:      Rate and Rhythm: Normal rate.   Pulmonary:      Effort: Pulmonary effort is normal.      Comments: Distant breath sound  Musculoskeletal: Normal range of motion.   Neurological:      General: No focal deficit present.      Mental Status: He is oriented to person, place, and time.         Vents:  Oxygen Concentration (%): 40 (09/13/20 0500)    Lines/Drains/Airways     Peripheral Intravenous Line                 Peripheral IV - Single Lumen 10/18/17 1200 18 G Right Antecubital 1060 days         Peripheral IV - Single Lumen 09/12/20 18 G Left Antecubital 1 day         Peripheral IV - Single Lumen 09/13/20 0100 18 G Right Antecubital less than 1 day                Significant Labs:    CBC/Anemia Profile:  Recent Labs   Lab 09/12/20  1853 09/12/20  1956 09/13/20  0537   WBC 6.24  --  4.53   HGB 12.4*  --  12.1*   HCT 43.7  --  41.7     --  169   MCV 95  --  93   RDW 15.9*  --  15.9*   FERRITIN  --  207  --         Chemistries:  Recent Labs   Lab 09/12/20  1853 09/13/20  0537   NA  147* 144   K 4.2 4.2   CL 97 95   CO2 39* 39*   BUN 27* 28*   CREATININE 0.9 0.9   CALCIUM 9.4 9.2   ALBUMIN 3.3*  --    PROT 7.4  --    BILITOT 0.5  --    ALKPHOS 52*  --    ALT 11  --    AST 28  --    MG  --  2.0   PHOS  --  2.9     BNP  Recent Labs   Lab 09/12/20  1853   *      procal 0.7    ABGs:   Recent Labs   Lab 09/13/20  1125   PH 7.399   PCO2 70.9*   HCO3 43.8*   POCSATURATED 85*   BE 15     Pulmonary Functions Testing Results: 3/20/07 ratio 38%; fev1 25%; fvc 53%    Significant Imaging:   CXR: I have reviewed all pertinent results/findings within the past 24 hours and my personal findings are:  increase reticular markings.  hyperinflated.       CT chest 2/12/16 severe emphysematous changes.  Consolidation in zoila obscuring prior pet avid nodule.

## 2020-09-13 NOTE — ASSESSMENT & PLAN NOTE
On home oxygen.  Repeat vbg with compensated pH.  Suspect copd exacerbation +/- volume.  Physical exam not suggestive of volume overload.  Due elevated bnp and difficulty with maintaining adequate sat while on nasal canula, will give an empiric trial of lasix.  Will order echo to assess pasp.  Agree with steroid and nebs.  Recommend antibiotic de-escalation due to normal procal and wbc.

## 2020-09-13 NOTE — HPI
Patient is 80 y.o. male  has a past medical history of CHF (congestive heart failure), COPD (chronic obstructive pulmonary disease), Hypertension, Respiratory distress (10/28/2015), Squamous cell cancer of epiglottis (4/21/2014), and Vocal cord polyps. presented to [facility] on with worsening sob x 2 days.  ABG in ED with acute on chronic hypercapnic respiratory failure.  Patient was placed on BPAP along with nebs, steroid.  Patient was on nasal canula this am.  +choking episode while eating grits and sausage.  Oxygen sat drop into low 80s.  Placed back on bipap with improve oxygen sat.  Currenlty, patient denied chest pain.  No orthopnea.  No fever/chills.  No chest pain.   Patient is known to me from outpatient visit.   Patient smoke 1 ppd x 55 years.  Currently, smoke 1/3 ppd.  Patient is on 3 L nasal canula.  +carroll x few steps.    Mr. 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  He is completed 50 Gy followed by an 18 Gy (at 2.25 Gy/fx) boost on 6/19/14.    In October 20014, the patient was admitted for treatment of pneumonia.  The patient was treated and symptoms resolved.  Follow up CXR on 11/25/14 revealed an abnormal ovoid opacity projecting over the lateral aspect of the right upper lobe.  This was concerning for a solitary pulmonary nodule.  CT scan performed the same day confirmed a 1.7 cm indeterminate soft tissue nodular opacity in the lateral aspect of the right upper lobe.  PET on 1/7/15 confirmed the mass was hypermetabolic.  There was also additional nodularity identified along the right major fissure and subcentimeter nodule identified within the left lower lobe which was not hypermetabolic and too small to characterize.  Due to his severe copd/emphysema, patient is not a candidate for surgery or even CT guided biopsy.  after tumor conference, patient was referred to rad onc.

## 2020-09-13 NOTE — NURSING TRANSFER
Nursing Transfer Note      9/13/2020     Transfer transfer from room 262 to room 408 B     Transfer via wheelchair    Transfer with  to O2,   Transported by staff    Medicines sent: na    Chart send with patient: Yes    Notified: per pt    Patient reassessed at:  (date, time)    Upon arrival to floor: patient oriented to room, call bell in reach and bed in lowest position

## 2020-09-13 NOTE — HPI
Enzo Wilson is a 80 y.o. male that (in part)  has a past medical history of CHF (congestive heart failure), COPD (chronic obstructive pulmonary disease), Hypertension, Respiratory distress, Squamous cell cancer of epiglottis, and Vocal cord polyps.  has a past surgical history that includes Tonsillectomy and Excision of mass of back (N/A, 6/10/2019). Presents to [facility] complaining of cough associated with shortness of breath.  Subacute onset 2 days ago with progressive worsening.  Worsened with ambulation and minimal exertion.  Minimally relieved with rest.  On home oxygen with nasal cannula at 3 L. was given DuoNebs and supplemental oxygen by EMS with initial improvement in oxygenation followed by a subsequent decline again once he was in the emergency department.  Denies recent travel or sick contacts.  No COVID-19 exposure.  Smokes cigarettes.  History of COPD.  Reports compliance with home medication regimen.  Denies hemoptysis.  Positive for wheezing.  Denies stridor or night sweats.  Has dyspnea with exertion.  Moderate to severe intensity.  Daily frequency.  Constant duration.    In the emergency department routine laboratory studies, chest x-ray, EKG, cardiac enzymes were obtained.  Unable to maintain oxygenation with nasal cannula.  Non-rebreather given for short time.  ABG performed which was concerning for pH of 7.2 and a pCO2 of 99.1, and PO2 of 62.  Was given empiric antibiotics, DuoNeb treatment, steroids, and started on BiPAP.  Being admitted for acute on chronic hypoxemic and hypercapnic respiratory failure secondary to COPD exacerbation.    Hospital medicine has been asked to admit to inpatient for further evaluation and treatment.

## 2020-09-13 NOTE — CONSULTS
[facility]  Pulmonology  Consult Note    Patient Name: Enzo Wilson  MRN: [id number]  Admission Date: 9/12/2020  Hospital Length of Stay: 1 days  Code Status: Full Code  Attending Physician: Nyla Sandra MD  Primary Care Provider: Lewis Self MD   Principal Problem: Acute on chronic respiratory failure with hypoxia and hypercapnia    Inpatient consult to Pulmonology  Consult performed by: Rudy Hammer MD  Consult ordered by: Nyla Sandra MD        Subjective:     HPI:  Patient is 80 y.o. male  has a past medical history of CHF (congestive heart failure), COPD (chronic obstructive pulmonary disease), Hypertension, Respiratory distress (10/28/2015), Squamous cell cancer of epiglottis (4/21/2014), and Vocal cord polyps. presented to [facility] on with worsening sob x 2 days.  ABG in ED with acute on chronic hypercapnic respiratory failure.  Patient was placed on BPAP along with nebs, steroid.  Patient was on nasal canula this am.  +choking episode while eating grits and sausage.  Oxygen sat drop into low 80s.  Placed back on bipap with improve oxygen sat.  Currenlty, patient denied chest pain.  No orthopnea.  No fever/chills.  No chest pain.   Patient is known to me from outpatient visit.   Patient smoke 1 ppd x 55 years.  Currently, smoke 1/3 ppd.  Patient is on 3 L nasal canula.  +carroll x few steps.    Mr. 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  He is completed 50 Gy followed by an 18 Gy (at 2.25 Gy/fx) boost on 6/19/14.    In October 20014, the patient was admitted for treatment of pneumonia.  The patient was treated and symptoms resolved.  Follow up CXR on 11/25/14 revealed an abnormal ovoid opacity projecting over the lateral aspect of the right upper lobe.  This was concerning for a solitary pulmonary nodule.  CT scan performed the same day confirmed a 1.7 cm indeterminate soft tissue nodular opacity in the lateral aspect of the  right upper lobe.  PET on 1/7/15 confirmed the mass was hypermetabolic.  There was also additional nodularity identified along the right major fissure and subcentimeter nodule identified within the left lower lobe which was not hypermetabolic and too small to characterize.  Due to his severe copd/emphysema, patient is not a candidate for surgery or even CT guided biopsy.  after tumor conference, patient was referred to rad onc.      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    Vocal cord polyps        Past Surgical History:   Procedure Laterality Date    EXCISION OF MASS OF BACK N/A 6/10/2019    Procedure: EXCISION, MASS, BACK;  Surgeon: Canelo Lockwood MD;  Location: [facility];  Service: General;  Laterality: N/A;    TONSILLECTOMY         Review of patient's allergies indicates:  No Known Allergies    Family History     Problem Relation (Age of Onset)    Heart disease Father, Maternal Uncle    Hypertension Father, Son, Maternal Grandmother, Maternal Grandfather    Kidney disease Mother        Tobacco Use    Smoking status: Current Some Day Smoker     Packs/day: 1.00     Years: 55.00     Pack years: 55.00    Smokeless tobacco: Never Used    Tobacco comment: has been weaning down   Substance and Sexual Activity    Alcohol use: Yes     Alcohol/week: 6.0 standard drinks     Types: 6 Cans of beer per week     Comment: 1-2  DRINKS A WEEK    Drug use: No    Sexual activity: Not Currently         Review of Systems   Constitutional:         no weight loss, no fever, no night sweat   HENT:        No sore throat, normal hearing acuity   Eyes:        No visual disturbance   Respiratory: Negative for apnea and choking.         Pulmonary related symptoms as per HPI.   Cardiovascular:         No chest pain, no orthopnea, no PND   Gastrointestinal:        No melena, no hematochezia, no diarhea, no constipation.   Endocrine:        No heat or cold intolerance.   Genitourinary:        No dysuria, no hematuria, no hesistancy, no dribbling   Skin:        No rash   Neurological:        No syncope, no vertigo, no tinitus   Psychiatric/Behavioral:        No homocide or suicide ideation; no depression.     Objective:     Vital Signs (Most Recent):  Temp: 97.5 °F (36.4 °C) (09/13/20 0701)  Pulse: 98 (09/13/20 1100)  Resp: (!) 28 (09/13/20 1100)  BP: 124/73 (09/13/20 1100)  SpO2: (!) 94 % (09/13/20 1100) Vital Signs (24h Range):  Temp:  [97.5 °F (36.4 °C)-98.7 °F (37.1 °C)] 97.5 °F (36.4 °C)  Pulse:  [] 98  Resp:  [16-40] 28  SpO2:  [81 %-100 %] 94 %  BP: (108-172)/() 124/73     Weight: 54.4 kg (120 lb)  Body mass index is 15 kg/m².      Intake/Output Summary (Last 24 hours) at 9/13/2020 1157  Last data filed at 9/13/2020 1000  Gross per 24 hour   Intake 240 ml   Output 175 ml   Net 65 ml       Physical Exam  Constitutional:       Appearance: He is underweight. He is ill-appearing.   HENT:      Head: Normocephalic and atraumatic.      Nose: Nose normal.      Mouth/Throat:      Comments: Poor dentition  Eyes:      Extraocular Movements: Extraocular movements intact.      Pupils: Pupils are equal, round, and reactive to light.   Cardiovascular:      Rate and Rhythm: Normal rate.   Pulmonary:      Effort: Pulmonary effort is normal.      Comments: Distant breath sound  Musculoskeletal: Normal range of motion.   Neurological:      General: No focal deficit present.      Mental Status: He is oriented to person, place, and time.         Vents:  Oxygen Concentration (%): 40 (09/13/20 0500)    Lines/Drains/Airways     Peripheral Intravenous Line                 Peripheral IV - Single Lumen 10/18/17 1200 18 G Right Antecubital 1060 days         Peripheral IV - Single Lumen 09/12/20 18 G Left Antecubital 1 day          Peripheral IV - Single Lumen 09/13/20 0100 18 G Right Antecubital less than 1 day                Significant Labs:    CBC/Anemia Profile:  Recent Labs   Lab 09/12/20 1853 09/12/20 1956 09/13/20  0537   WBC 6.24  --  4.53   HGB 12.4*  --  12.1*   HCT 43.7  --  41.7     --  169   MCV 95  --  93   RDW 15.9*  --  15.9*   FERRITIN  --  207  --         Chemistries:  Recent Labs   Lab 09/12/20 1853 09/13/20  0537   * 144   K 4.2 4.2   CL 97 95   CO2 39* 39*   BUN 27* 28*   CREATININE 0.9 0.9   CALCIUM 9.4 9.2   ALBUMIN 3.3*  --    PROT 7.4  --    BILITOT 0.5  --    ALKPHOS 52*  --    ALT 11  --    AST 28  --    MG  --  2.0   PHOS  --  2.9     BNP  Recent Labs   Lab 09/12/20 1853   *      procal 0.7    ABGs:   Recent Labs   Lab 09/13/20  1125   PH 7.399   PCO2 70.9*   HCO3 43.8*   POCSATURATED 85*   BE 15     Pulmonary Functions Testing Results: 3/20/07 ratio 38%; fev1 25%; fvc 53%    Significant Imaging:   CXR: I have reviewed all pertinent results/findings within the past 24 hours and my personal findings are:  increase reticular markings.  hyperinflated.       CT chest 2/12/16 severe emphysematous changes.  Consolidation in zoila obscuring prior pet avid nodule.      Assessment/Plan:     * Acute on chronic respiratory failure with hypoxia and hypercapnia  On home oxygen.  Repeat vbg with compensated pH.  Suspect copd exacerbation +/- volume.  Physical exam not suggestive of volume overload.  Due elevated bnp and difficulty with maintaining adequate sat while on nasal canula, will give an empiric trial of lasix.  Will order echo to assess pasp.  Agree with steroid and nebs.  Recommend antibiotic de-escalation due to normal procal and wbc.      Goals of care, counseling/discussion  Doing surprisingly well with such poor lung function.  It is reasonable to have palliative care on board to help establish goals of care.      Tobacco abuse  Encourage smoking cessation.      Pulmonary nodule  rul  nodule noted in 2014 with pet avidity.  Not candidate for ct guided biopsy due to emphysematous disease and high risk for pneumon.  Was followed by Dr. Gil.  Appeared to be lost to follow up.  Current cxr with increase reticular markings.  Will send for ct of chest for better evaluation.      Pulmonary artery hypertension  Await echo.  supect group 3.      COPD (chronic obstructive pulmonary disease)  fev1 25% in 2007.  Unclear of home regimen.  Should be on laba/ics + lama +/- daliresp.          Thank you for your consult. I will follow-up with patient. Please contact us if you have any additional questions.     Rudy Hammer MD  Pulmonology  [facility]    Critical Care Time: 45  minutes  Critical secondary to respiratory  failure     Critical care was time spent personally by me on the following activities: development of treatment plan with patient or surrogate and bedside caregivers, discussions with consultants, evaluation of patient's response to treatment, examination of patient, ordering and performing treatments and interventions, ordering and review of laboratory studies, ordering and review of radiographic studies, pulse oximetry, re-evaluation of patient's condition.  This critical care time did not overlap with that of any other provider or involve time for any procedures.

## 2020-09-13 NOTE — ED PROVIDER NOTES
Encounter Date: 9/12/2020       History     Chief Complaint   Patient presents with    Cough     pt comes in via EMS from home with c/o productive cough x2 days. He states he felt too bad to do his home nebs. EMS reports crackles bilaterally with 80% SpO2 on his home 3L. He received one duoneb and 125mg IV solumedrol per EMS and improved to 100% on 3L. hx of COPD     80-year-old male presents via [facility] with shortness of breath.  Patient reports he has been increasingly short of breath over the last few days.  He also has a cough productive of brown and finally blood-tinged sputum.  No fevers.  Patient was too weak today to take his own breathing treatment.  The paramedics found him with pulse ox 77% on his usual 3 L home O2.  Paramedics administered Solu-Medrol 125 mg IV and DuoNeb EN route to hospital and placed patient on NRB with improvement in sats.      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    Vocal cord polyps      Past Surgical History:   Procedure Laterality Date    EXCISION OF MASS OF BACK N/A 6/10/2019    Procedure: EXCISION, MASS, BACK;  Surgeon: Canelo Lockwood MD;  Location: [facility];  Service: General;  Laterality: N/A;    TONSILLECTOMY       Family History   Problem Relation Age of Onset    Heart disease Father     Hypertension Father     Hypertension Son     Heart disease Maternal Uncle     Hypertension Maternal Grandmother     Hypertension Maternal Grandfather     Kidney disease Mother      Social  History     Tobacco Use    Smoking status: Current Some Day Smoker     Packs/day: 1.00     Years: 55.00     Pack years: 55.00    Smokeless tobacco: Never Used    Tobacco comment: has been weaning down   Substance Use Topics    Alcohol use: Yes     Alcohol/week: 6.0 standard drinks     Types: 6 Cans of beer per week     Comment: 1-2  DRINKS A WEEK    Drug use: No     Review of Systems   Constitutional: Negative for fever.   HENT: Negative for sore throat.    Eyes: Negative for photophobia.   Respiratory: Positive for cough and shortness of breath.    Cardiovascular: Negative for chest pain.   Gastrointestinal: Negative for abdominal pain.   Genitourinary: Negative for dysuria.   Musculoskeletal: Negative for neck pain.   Skin: Negative for rash.   Neurological: Negative for headaches.       Physical Exam     Initial Vitals [09/12/20 1800]   BP Pulse Resp Temp SpO2   132/71 86 (!) 24 98 °F (36.7 °C) 100 %      MAP       --         Physical Exam    Nursing note and vitals reviewed.  Constitutional: He appears well-developed and well-nourished. He is not diaphoretic.   Cachectic older male, awake/alert.   HENT:   Head: Normocephalic and atraumatic.   Eyes: Conjunctivae and EOM are normal. Pupils are equal, round, and reactive to light.   Neck: Normal range of motion. Neck supple.   Cardiovascular: Normal rate, regular rhythm, normal heart sounds and intact distal pulses.   No murmur heard.  Pulmonary/Chest: He is in respiratory distress. He has wheezes (rare). He has rhonchi (RLL). He has no rales.   Very diminished breath sounds throughout.   Abdominal: Soft. There is no abdominal tenderness.   Musculoskeletal: Normal range of motion. No tenderness or edema.   Neurological: He is alert and oriented to person, place, and time. He has normal strength.   Moving all extremities   Skin: Skin is warm and dry.   Psychiatric: He has a normal mood and affect.         ED Course   Critical Care    Date/Time: 9/12/2020 11:00  PM  Performed by: Lidya Kraus MD  Authorized by: Lidya Kraus MD   Direct patient critical care time: 12 minutes  Additional history critical care time: 8 minutes  Ordering / reviewing critical care time: 8 minutes  Documentation critical care time: 10 minutes  Consulting other physicians critical care time: 8 minutes  Consult with family critical care time: 4 minutes  Total critical care time (exclusive of procedural time) : 50 minutes        Labs Reviewed   CBC W/ AUTO DIFFERENTIAL - Abnormal; Notable for the following components:       Result Value    Hemoglobin 12.4 (*)     Mean Corpuscular Hemoglobin 26.9 (*)     Mean Corpuscular Hemoglobin Conc 28.4 (*)     RDW 15.9 (*)     MPV 9.0 (*)     Immature Granulocytes 0.6 (*)     Gran% 73.5 (*)     Lymph% 17.8 (*)     All other components within normal limits   COMPREHENSIVE METABOLIC PANEL - Abnormal; Notable for the following components:    Sodium 147 (*)     CO2 39 (*)     BUN, Bld 27 (*)     Albumin 3.3 (*)     Alkaline Phosphatase 52 (*)     All other components within normal limits   B-TYPE NATRIURETIC PEPTIDE - Abnormal; Notable for the following components:     (*)     All other components within normal limits   C-REACTIVE PROTEIN - Abnormal; Notable for the following components:    CRP 28.6 (*)     All other components within normal limits   ISTAT PROCEDURE - Abnormal; Notable for the following components:    POC PH 7.267 (*)     POC PCO2 99.1 (*)     POC PO2 62 (*)     POC HCO3 45.2 (*)     POC SATURATED O2 85 (*)     POC TCO2 48 (*)     All other components within normal limits   CULTURE, BLOOD   CULTURE, BLOOD   TROPONIN I   LACTIC ACID, PLASMA   FERRITIN   LACTATE DEHYDROGENASE   CK   SARS-COV-2 RDRP GENE     EKG Readings: (Independently Interpreted)   18:41: NSR, HR 97. L axis. No ectopy. No STEMI.   20:58: NSR, HR 94. L axis. No ectopy. No STEMI.        Imaging Results          X-Ray Chest AP Portable (Final result)  Result time  09/12/20 19:14:00    Final result by Arjun Spencer MD (09/12/20 19:14:00)                 Impression:      1. Mild cardiomegaly.  2. Bilateral interstitial parenchymal airspace disease in upper and lower lobes minimally increased from the prior study could represent chronic scarring or superimposed infiltrates.  Probable small pleural effusion on the right also.  Recommend follow-up.  3. COPD with emphysematous changes.      Electronically signed by: Arjun Spencer  Date:    09/12/2020  Time:    19:14             Narrative:    EXAMINATION:  XR CHEST AP PORTABLE    CLINICAL HISTORY:  shortness of breath;    TECHNIQUE:  Single frontal view of the chest was performed.    COMPARISON:  10/18/2017    FINDINGS:  Cardiac silhouette is mildly enlarged.    Lungs appear mildly hyperexpanded with prominence of the interstitial changes likely associated with COPD.    Mild bibasilar atelectasis or infiltrate.  Probable small effusion on the right.  Mild upper lobe interstitial and alveolar changes bilaterally may be associated with mild infiltrate or scarring.    The bilateral interstitial changes are minimally increased from the prior study.    Probable emphysematous bleb formation at the left lung apex and anteriorly on the right in the right upper lobe also.    No evidence of pneumothorax.                              X-Rays:   Independently Interpreted Readings:   Other Readings:  CXR: Bilateral interstitial parenchymal airspace disease     Medical Decision Making:   History:   I obtained history from: EMS provider.  Old Medical Records: I decided to obtain old medical records.  Old Records Summarized: records from previous admission(s).  Initial Assessment:   80 y.o. male with COPD and CHF here with SOB. 77% on home O2.  Differential Diagnosis:   Ddx includes COPD exacerbation, CHF exacerbation, COVID-19, bacterial PNA, ACS, PE, other.  Independently Interpreted Test(s):   I have ordered and independently interpreted  "X-rays - see prior notes.  I have ordered and independently interpreted EKG Reading(s) - see prior notes  Clinical Tests:   Lab Tests: Ordered and Reviewed  Radiological Study: Ordered and Reviewed  Medical Tests: Ordered and Reviewed  ED Management:  EKG no STEMI.    CXR: "Bilateral interstitial parenchymal airspace disease," worsening of chronic lung disease vs new pneumonia.    Labs: COVID-19 negative. Troponin negative. , minimally elevated. CBC with normal WBC, Hgb. Renal function stable.    Patient had albuterol 10mg / ipratropium 1.5mg continuous neb here. Sats 100% during tx but 84-85% on NC at 4L after.    Respiratory gas on NRB at 15L: pH 7.26, PaO2 62, PaCO2 99.     Respiratory placed patient on BIPAP 20 / 5 with additional nebs (albuterol 10mg / ipratropium 1.5mg).     Patient remains awake/alert despite ABG findings.     Patient had Azithromycin/Rocephin here for CAP. As noted, he had solumedrol PTA.    Patient requires admission for hypercapnia and hypoxia, likely secondary to COPD exacerbation, questionably with superimposed infectious process.  I have updated the patient as well as his family member via telephone.  I have discussed this case with nocturnist for hospital medicine, Dr. Wellington Aldana, who has written courtesy orders.  Other:   I have discussed this case with another health care provider.                             Clinical Impression:       ICD-10-CM ICD-9-CM   1. COPD exacerbation  J44.1 491.21   2. Shortness of breath  R06.02 786.05   3. Hypercarbia  R06.89 786.09   4. Hypoxia  R09.02 799.02   5. Community acquired pneumonia, unspecified laterality  J18.9 486             ED Disposition Condition    Admit                             Lidya Kraus MD  09/13/20 0000    "

## 2020-09-13 NOTE — ASSESSMENT & PLAN NOTE
rul nodule noted in 2014 with pet avidity.  Not candidate for ct guided biopsy due to emphysematous disease and high risk for pneumon.  Was followed by Dr. Gil.  Appeared to be lost to follow up.  Current cxr with increase reticular markings.  Will send for ct of chest for better evaluation.

## 2020-09-13 NOTE — PLAN OF CARE
Pt received on Bipap from ER, stable  without distress on the following settings;  20/5  8RR 40% FI02.  RT will continue to monitor and titrate as tolerated

## 2020-09-13 NOTE — CARE UPDATE
Received a deterioration alert on patient. Alert likely generated secondary to RR of 34. Discussed with Dr. Sandra at 11:21am. She will send rapid nurse to evaluate.

## 2020-09-13 NOTE — PROGRESS NOTES
Pt eating breakfast and was coughing. Sats 83 %. Dr. Sandra and Dr. Hammer informed. Pt placed back on Bipap and transfer on hold at this time. Marnie notified that pt transfer on hold.

## 2020-09-13 NOTE — NURSING
Pt arrived to unit. CR monitor on. Pt oriented to room and unit and instructed on POC. Pt verb understanding.

## 2020-09-14 LAB
ANION GAP SERPL CALC-SCNC: 7 MMOL/L (ref 8–16)
AORTIC ROOT ANNULUS: 3.03 CM
AORTIC VALVE CUSP SEPERATION: 1.8 CM
AV INDEX (PROSTH): 0.72
AV MEAN GRADIENT: 7 MMHG
AV PEAK GRADIENT: 13 MMHG
AV VALVE AREA: 2.31 CM2
AV VELOCITY RATIO: 0.49
BASOPHILS # BLD AUTO: 0 K/UL (ref 0–0.2)
BASOPHILS NFR BLD: 0 % (ref 0–1.9)
BSA FOR ECHO PROCEDURE: 1.7 M2
BUN SERPL-MCNC: 28 MG/DL (ref 8–23)
CALCIUM SERPL-MCNC: 9.8 MG/DL (ref 8.7–10.5)
CHLORIDE SERPL-SCNC: 93 MMOL/L (ref 95–110)
CO2 SERPL-SCNC: 44 MMOL/L (ref 23–29)
CREAT SERPL-MCNC: 0.8 MG/DL (ref 0.5–1.4)
CV ECHO LV RWT: 0.41 CM
DIFFERENTIAL METHOD: ABNORMAL
DOP CALC AO PEAK VEL: 1.82 M/S
DOP CALC AO VTI: 22.46 CM
DOP CALC LVOT AREA: 3.2 CM2
DOP CALC LVOT DIAMETER: 2.02 CM
DOP CALC LVOT PEAK VEL: 0.9 M/S
DOP CALC LVOT STROKE VOLUME: 51.89 CM3
DOP CALCLVOT PEAK VEL VTI: 16.2 CM
E WAVE DECELERATION TIME: 165.79 MSEC
E/A RATIO: 0.87
E/E' RATIO: 7.79 M/S
ECHO LV POSTERIOR WALL: 0.94 CM (ref 0.6–1.1)
EOSINOPHIL # BLD AUTO: 0.2 K/UL (ref 0–0.5)
EOSINOPHIL NFR BLD: 3.9 % (ref 0–8)
ERYTHROCYTE [DISTWIDTH] IN BLOOD BY AUTOMATED COUNT: 15.7 % (ref 11.5–14.5)
EST. GFR  (AFRICAN AMERICAN): >60 ML/MIN/1.73 M^2
EST. GFR  (NON AFRICAN AMERICAN): >60 ML/MIN/1.73 M^2
FRACTIONAL SHORTENING: 33 % (ref 28–44)
GLUCOSE SERPL-MCNC: 137 MG/DL (ref 70–110)
HCT VFR BLD AUTO: 43.5 % (ref 40–54)
HGB BLD-MCNC: 12.7 G/DL (ref 14–18)
IMM GRANULOCYTES # BLD AUTO: 0.02 K/UL (ref 0–0.04)
IMM GRANULOCYTES NFR BLD AUTO: 0.3 % (ref 0–0.5)
INTERVENTRICULAR SEPTUM: 0.8 CM (ref 0.6–1.1)
IVRT: 95.15 MSEC
LA MAJOR: 5.35 CM
LEFT ATRIUM SIZE: 2.95 CM
LEFT INTERNAL DIMENSION IN SYSTOLE: 3.08 CM (ref 2.1–4)
LEFT VENTRICLE DIASTOLIC VOLUME INDEX: 54.79 ML/M2
LEFT VENTRICLE DIASTOLIC VOLUME: 96.74 ML
LEFT VENTRICLE MASS INDEX: 74 G/M2
LEFT VENTRICLE SYSTOLIC VOLUME INDEX: 21.1 ML/M2
LEFT VENTRICLE SYSTOLIC VOLUME: 37.27 ML
LEFT VENTRICULAR INTERNAL DIMENSION IN DIASTOLE: 4.59 CM (ref 3.5–6)
LEFT VENTRICULAR MASS: 131.17 G
LV LATERAL E/E' RATIO: 6.17 M/S
LV SEPTAL E/E' RATIO: 10.57 M/S
LYMPHOCYTES # BLD AUTO: 0.3 K/UL (ref 1–4.8)
LYMPHOCYTES NFR BLD: 4.3 % (ref 18–48)
MAGNESIUM SERPL-MCNC: 1.9 MG/DL (ref 1.6–2.6)
MCH RBC QN AUTO: 27 PG (ref 27–31)
MCHC RBC AUTO-ENTMCNC: 29.2 G/DL (ref 32–36)
MCV RBC AUTO: 92 FL (ref 82–98)
MONOCYTES # BLD AUTO: 0.3 K/UL (ref 0.3–1)
MONOCYTES NFR BLD: 5.6 % (ref 4–15)
MV PEAK A VEL: 0.85 M/S
MV PEAK E VEL: 0.74 M/S
MV STENOSIS PRESSURE HALF TIME: 67.36 MS
MV VALVE AREA P 1/2 METHOD: 3.27 CM2
NEUTROPHILS # BLD AUTO: 5.2 K/UL (ref 1.8–7.7)
NEUTROPHILS NFR BLD: 85.9 % (ref 38–73)
NRBC BLD-RTO: 0 /100 WBC
PHOSPHATE SERPL-MCNC: 2.2 MG/DL (ref 2.7–4.5)
PISA TR MAX VEL: 4.51 M/S
PLATELET # BLD AUTO: 169 K/UL (ref 150–350)
PMV BLD AUTO: 9.6 FL (ref 9.2–12.9)
POTASSIUM SERPL-SCNC: 3.8 MMOL/L (ref 3.5–5.1)
PV PEAK VELOCITY: 0.89 CM/S
RA MAJOR: 4.67 CM
RA PRESSURE: 8 MMHG
RA WIDTH: 3.81 CM
RBC # BLD AUTO: 4.71 M/UL (ref 4.6–6.2)
RIGHT VENTRICULAR END-DIASTOLIC DIMENSION: 3.83 CM
SODIUM SERPL-SCNC: 144 MMOL/L (ref 136–145)
TDI LATERAL: 0.12 M/S
TDI SEPTAL: 0.07 M/S
TDI: 0.1 M/S
TR MAX PG: 81 MMHG
TRICUSPID ANNULAR PLANE SYSTOLIC EXCURSION: 1.95 CM
TV REST PULMONARY ARTERY PRESSURE: 89 MMHG
WBC # BLD AUTO: 6.08 K/UL (ref 3.9–12.7)

## 2020-09-14 PROCEDURE — S4991 NICOTINE PATCH NONLEGEND: HCPCS | Mod: HCNC | Performed by: HOSPITALIST

## 2020-09-14 PROCEDURE — 84100 ASSAY OF PHOSPHORUS: CPT | Mod: HCNC

## 2020-09-14 PROCEDURE — 85025 COMPLETE CBC W/AUTO DIFF WBC: CPT | Mod: HCNC

## 2020-09-14 PROCEDURE — 63600175 PHARM REV CODE 636 W HCPCS: Mod: HCNC | Performed by: HOSPITALIST

## 2020-09-14 PROCEDURE — 97110 THERAPEUTIC EXERCISES: CPT | Mod: HCNC

## 2020-09-14 PROCEDURE — 94640 AIRWAY INHALATION TREATMENT: CPT | Mod: HCNC

## 2020-09-14 PROCEDURE — 11000001 HC ACUTE MED/SURG PRIVATE ROOM: Mod: HCNC

## 2020-09-14 PROCEDURE — 94761 N-INVAS EAR/PLS OXIMETRY MLT: CPT | Mod: HCNC

## 2020-09-14 PROCEDURE — 97162 PT EVAL MOD COMPLEX 30 MIN: CPT | Mod: HCNC

## 2020-09-14 PROCEDURE — 99233 SBSQ HOSP IP/OBS HIGH 50: CPT | Mod: HCNC,GC,, | Performed by: INTERNAL MEDICINE

## 2020-09-14 PROCEDURE — 83735 ASSAY OF MAGNESIUM: CPT | Mod: HCNC

## 2020-09-14 PROCEDURE — 25000242 PHARM REV CODE 250 ALT 637 W/ HCPCS: Mod: HCNC | Performed by: HOSPITALIST

## 2020-09-14 PROCEDURE — 27000221 HC OXYGEN, UP TO 24 HOURS: Mod: HCNC

## 2020-09-14 PROCEDURE — 25000003 PHARM REV CODE 250: Mod: HCNC | Performed by: HOSPITALIST

## 2020-09-14 PROCEDURE — 99233 PR SUBSEQUENT HOSPITAL CARE,LEVL III: ICD-10-PCS | Mod: HCNC,GC,, | Performed by: INTERNAL MEDICINE

## 2020-09-14 PROCEDURE — 36415 COLL VENOUS BLD VENIPUNCTURE: CPT | Mod: HCNC

## 2020-09-14 PROCEDURE — 80048 BASIC METABOLIC PNL TOTAL CA: CPT | Mod: HCNC

## 2020-09-14 PROCEDURE — 97165 OT EVAL LOW COMPLEX 30 MIN: CPT | Mod: HCNC

## 2020-09-14 RX ADMIN — POTASSIUM PHOSPHATE, MONOBASIC AND POTASSIUM PHOSPHATE, DIBASIC 15 MMOL: 224; 236 INJECTION, SOLUTION INTRAVENOUS at 10:09

## 2020-09-14 RX ADMIN — LEVOFLOXACIN 750 MG: 750 INJECTION, SOLUTION INTRAVENOUS at 03:09

## 2020-09-14 RX ADMIN — FLUTICASONE FUROATE AND VILANTEROL TRIFENATATE 1 PUFF: 100; 25 POWDER RESPIRATORY (INHALATION) at 07:09

## 2020-09-14 RX ADMIN — METHYLPREDNISOLONE SODIUM SUCCINATE 80 MG: 125 INJECTION, POWDER, FOR SOLUTION INTRAMUSCULAR; INTRAVENOUS at 09:09

## 2020-09-14 RX ADMIN — METHYLPREDNISOLONE SODIUM SUCCINATE 80 MG: 125 INJECTION, POWDER, FOR SOLUTION INTRAMUSCULAR; INTRAVENOUS at 02:09

## 2020-09-14 RX ADMIN — AMLODIPINE BESYLATE 10 MG: 5 TABLET ORAL at 09:09

## 2020-09-14 RX ADMIN — ENOXAPARIN SODIUM 40 MG: 40 INJECTION SUBCUTANEOUS at 05:09

## 2020-09-14 RX ADMIN — IPRATROPIUM BROMIDE AND ALBUTEROL SULFATE 3 ML: .5; 3 SOLUTION RESPIRATORY (INHALATION) at 08:09

## 2020-09-14 RX ADMIN — DOCUSATE SODIUM 50 MG AND SENNOSIDES 8.6 MG 1 TABLET: 8.6; 5 TABLET, FILM COATED ORAL at 09:09

## 2020-09-14 RX ADMIN — FAMOTIDINE 20 MG: 20 TABLET ORAL at 09:09

## 2020-09-14 RX ADMIN — IPRATROPIUM BROMIDE AND ALBUTEROL SULFATE 3 ML: .5; 3 SOLUTION RESPIRATORY (INHALATION) at 07:09

## 2020-09-14 RX ADMIN — IPRATROPIUM BROMIDE AND ALBUTEROL SULFATE 3 ML: .5; 3 SOLUTION RESPIRATORY (INHALATION) at 01:09

## 2020-09-14 RX ADMIN — IPRATROPIUM BROMIDE AND ALBUTEROL SULFATE 3 ML: .5; 3 SOLUTION RESPIRATORY (INHALATION) at 12:09

## 2020-09-14 RX ADMIN — POLYETHYLENE GLYCOL 3350 17 G: 17 POWDER, FOR SOLUTION ORAL at 09:09

## 2020-09-14 RX ADMIN — METHYLPREDNISOLONE SODIUM SUCCINATE 80 MG: 125 INJECTION, POWDER, FOR SOLUTION INTRAMUSCULAR; INTRAVENOUS at 06:09

## 2020-09-14 RX ADMIN — NICOTINE 1 PATCH: 21 PATCH, EXTENDED RELEASE TRANSDERMAL at 09:09

## 2020-09-14 NOTE — ASSESSMENT & PLAN NOTE
Chronically on O2 at 3L via NC  Acute decompensation due to COPD exacerbation  Patient was taken off BIPAP this morning temporarily, but had to be placed back on BIPAP on 9/13  Continued IV steroids, NEBS and BIPAP support  Weaned off BIPAP overnight and currently doing well on 4L via NC  Will begin weaning down steroids today   Stable for step down today  Begin mobilization with PT/OT and  for discharge planning  Discharge pending clinical progress and rehab recommendations

## 2020-09-14 NOTE — PLAN OF CARE
Problem: Physical Therapy Goal  Goal: Physical Therapy Goal  Description: Goals to be met by:     Patient will increase functional independence with mobility by performin. Supine to sit with Modified Jerome  2. Sit to supine with Modified Jerome  3. Sit to stand transfer with Modified Jerome with or without RW.  4. Bed to chair transfer with Modified Jerome using with or without Rolling Walker and oxygen.  5. Gait  x 250 feet with Modified Jerome using with or without Rolling Walker and oxygen.  6. Ascend/descend 1 flight of stairs with bilateral Handrails Modified Jerome and oxygen.  7. Lower extremity exercise program x20 reps per handout, with independence    Outcome: Ongoing, Progressing     Recommendation TBD once pt is able to further participate.

## 2020-09-14 NOTE — NURSING
0950: PT returned from CT no falls or injuries. PT belongings including clothes and wallet/money placed in belongings bag and placed in bedside drawers.

## 2020-09-14 NOTE — NURSING TRANSFER
Nursing Transfer Note      9/14/2020     Transfer To: [id number]    Transfer via wheelchair    Transfer with  to O2 and belongings     Transported by RN and Transport     Medicines sent: YES - K Phos     Chart send with patient: Yes    Notified: Per patient will notify family member     Patient reassessed at: 9/14/2 1315 (date, time)    Upon arrival to floor: patient oriented to room, call bell in reach and bed in lowest position. Pt transferred with belongings including wallet/money, phone and  and clothes.

## 2020-09-14 NOTE — ASSESSMENT & PLAN NOTE
fev1 25% in 2007.  Unclear of home regimen.  Should be on laba/ics + lama +/- daliresp(can be started outpatient).  -Breo + Spiriva. Discharge with Breo + Spiriva or Trelegy) Needs triple therapy.

## 2020-09-14 NOTE — HOSPITAL COURSE
Enzo Wilson is a 80 y.o. male that (in part)  has a past medical history of  COPD (chronic obstructive pulmonary disease), Hypertension, Respiratory distress, Squamous cell cancer of epiglottis, and Vocal cord polyps.  has a past surgical history that includes Tonsillectomy and Excision of mass of back (N/A, 6/10/2019). Presents to [facility] complaining of cough associated with shortness of breath.  Subacute onset 2 days ago with progressive worsening.  Worsened with ambulation and minimal exertion.  Minimally relieved with rest.  On home oxygen with nasal cannula at 3 L. was given DuoNebs and supplemental oxygen by EMS with initial improvement in oxygenation followed by a subsequent decline again once he was in the emergency department.  Denies recent travel or sick contacts.  No COVID-19 exposure.  Smokes cigarettes.  History of COPD.  Reports compliance with home medication regimen.  Denies hemoptysis.  Positive for wheezing.  Denies stridor or night sweats.  Has dyspnea with exertion.  Moderate to severe intensity.  Daily frequency.  Constant duration.  In the emergency department routine laboratory studies, chest x-ray, EKG, cardiac enzymes were obtained.  Unable to maintain oxygenation with nasal cannula.  Non-rebreather given for short time.  ABG performed which was concerning for pH of 7.2 and a pCO2 of 99.1, and PO2 of 62.  Was given empiric antibiotics, DuoNeb treatment, steroids, and started on BiPAP.  Was  admitted to ICU for acute on chronic hypoxemic and hypercapnic respiratory failure secondary to COPD exacerbation.  He required  BIPAP support. Treatment initiated with IV steroids, NEBS, O2, and empiric Levaquin. Pulmonary was consulted.his symptoms improved,he was transfered to floor and remains stable,did PT,OT and HH with DME at DC time is arranged.palliative care was following patient,he was agree with DNR status,no intubation,no CPR no  shock,nurse baron witnessed,DNR order is placed.  At DC time patient was on baseline on 3 liter oxygen,alert with no SOB.he was discharged home with HH and DME and follow up with PCP as out patient.

## 2020-09-14 NOTE — NURSING
[facility]  ICU Shift Summary  Date: 9/14/2020      COVID Test: (--)  Isolation: No active isolations     Prehospitalization: Home  Admit Date / LOS : 9/12/2020/ 2 days    Diagnosis: Acute on chronic respiratory failure with hypoxia and hypercapnia    Consults:        Active: Pulm CC       Needed: N/A     Code Status: Full Code   Advanced Directive: <no information>    LDA: PIV       Central Lines/Site/Justification:Patient Does Not Have Central Line       Urinary Cath/Order/Justification:Patient Does Not Have Urinary Catheter    Vasopressors/Infusions:        GOALS: Volume/ Hemodynamic: HR <                     RASS: 0  alert and calm    Pain Management: none       Pain Controlled: not applicable     Rhythm: ST    Respiratory Device: Nasal Cannula and Bipap    Oxygen Concentration (%):  [40] 40             Most Recent SBT/ SAT: N/A      VTE Prophylaxis: Mechanical  Mobility: Ambulatory  Stress Ulcer Prophylaxis: No    Dietary: PO  Tolerance: yes  /  Advancement: no    I & O (24h):    Intake/Output Summary (Last 24 hours) at 9/14/2020 0535  Last data filed at 9/14/2020 0500  Gross per 24 hour   Intake 1110 ml   Output 1800 ml   Net -690 ml        Restraints: No    Significant Dates:  Post Op Date: N/A  Rescue Date: N/A  Imaging/ Diagnostics: N/A    Noteworthy Labs:  CO2 44    CBC/Anemia Labs: Coags:    Recent Labs   Lab 09/12/20 1956 09/13/20 0537 09/14/20  0356   WBC  --  4.53 6.08   HGB  --  12.1* 12.7*   HCT  --  41.7 43.5   PLT  --  169 169   MCV  --  93 92   RDW  --  15.9* 15.7*   FERRITIN 207  --   --     No results for input(s): PT, INR, APTT in the last 168 hours.     Chemistries:   Recent Labs   Lab 09/12/20 1853 09/13/20  0537 09/14/20  0356   * 144 144   K 4.2 4.2 3.8   CL 97 95 93*   CO2 39* 39* 44*   BUN 27* 28* 28*   CREATININE 0.9 0.9 0.8   CALCIUM 9.4 9.2 9.8   PROT 7.4  --   --    BILITOT 0.5  --   --    ALKPHOS 52*  --   --    ALT 11  --   --    AST 28  --   --    MG  --   2.0 1.9   PHOS  --  2.9 2.2*        Cardiac Enzymes: Ejection Fractions:    Recent Labs     09/12/20  1853 09/12/20 1956   CPK  --  105   TROPONINI 0.020  --     No results found for: EF     POCT Glucose: HbA1c:    No results for input(s): POCTGLUCOSE in the last 168 hours. Hemoglobin A1C   Date Value Ref Range Status   09/13/2020 5.4 4.0 - 5.6 % Final     Comment:     ADA Screening Guidelines:  5.7-6.4%  Consistent with prediabetes  >or=6.5%  Consistent with diabetes  High levels of fetal hemoglobin interfere with the HbA1C  assay. Heterozygous hemoglobin variants (HbS, HgC, etc)do  not significantly interfere with this assay.   However, presence of multiple variants may affect accuracy.             ICU LOS 18h  Level of Care: OK to Transfer    Shift Summary/Plan for the shift: none  Assessment remains unchanged

## 2020-09-14 NOTE — ASSESSMENT & PLAN NOTE
Chronically on O2 at 3L via NC  Acute decompensation due to COPD exacerbation  Patient was taken off BIPAP this morning temporarily, but had to be placed back on BIPAP  Pt had worsening hypoxia after eating  Continue IV steroids, NEBS and BIPAP support  Will attempt to wean as tolerated

## 2020-09-14 NOTE — PROGRESS NOTES
[facility] Hospital Medicine  Progress Note    Patient Name: Enzo Wilson  MRN: [id number]  Patient Class: IP- Inpatient   Admission Date: 9/12/2020  Length of Stay: 1 days  Attending Physician: Nyla Sandra MD  Primary Care Provider: Lewis Self MD        Subjective:     Principal Problem:Acute on chronic respiratory failure with hypoxia and hypercapnia        HPI:  Enzo Wilson is a 80 y.o. male that (in part)  has a past medical history of CHF (congestive heart failure), COPD (chronic obstructive pulmonary disease), Hypertension, Respiratory distress, Squamous cell cancer of epiglottis, and Vocal cord polyps.  has a past surgical history that includes Tonsillectomy and Excision of mass of back (N/A, 6/10/2019). Presents to [facility] Emergency Department complaining of cough associated with shortness of breath.  Subacute onset 2 days ago with progressive worsening.  Worsened with ambulation and minimal exertion.  Minimally relieved with rest.  On home oxygen with nasal cannula at 3 L. was given DuoNebs and supplemental oxygen by EMS with initial improvement in oxygenation followed by a subsequent decline again once he was in the emergency department.  Denies recent travel or sick contacts.  No COVID-19 exposure.  Smokes cigarettes.  History of COPD.  Reports compliance with home medication regimen.  Denies hemoptysis.  Positive for wheezing.  Denies stridor or night sweats.  Has dyspnea with exertion.  Moderate to severe intensity.  Daily frequency.  Constant duration.    In the emergency department routine laboratory studies, chest x-ray, EKG, cardiac enzymes were obtained.  Unable to maintain oxygenation with nasal cannula.  Non-rebreather given for short time.  ABG performed which was concerning for pH of 7.2 and a pCO2 of 99.1, and PO2 of 62.  Was given empiric antibiotics, DuoNeb treatment, steroids, and started on BiPAP.  Being admitted for acute on  chronic hypoxemic and hypercapnic respiratory failure secondary to COPD exacerbation.    Hospital medicine has been asked to admit to inpatient for further evaluation and treatment.     Overview/Hospital Course:  Mr. Wilson presented with worsening SOB. Admitted to ICU for acute on chronic respiratory failure secondary to COPD exacerbation with need for BIPAP support. Treatment initiated with IV steroids, NEBS, O2, and empiric Levaquin.     Interval History: No acute events overnight, reported breathing much easier and wanted to eat.    Review of Systems   Constitutional: Negative for chills and fever.   Respiratory: Positive for shortness of breath.    Cardiovascular: Negative for chest pain.     Objective:     Vital Signs (Most Recent):  Temp: 97.8 °F (36.6 °C) (09/13/20 1501)  Pulse: 91 (09/13/20 2100)  Resp: (!) 26 (09/13/20 2100)  BP: 128/74 (09/13/20 2100)  SpO2: 98 % (09/13/20 2100) Vital Signs (24h Range):  Temp:  [97.5 °F (36.4 °C)-98.7 °F (37.1 °C)] 97.8 °F (36.6 °C)  Pulse:  [] 91  Resp:  [15-38] 26  SpO2:  [75 %-100 %] 98 %  BP: (104-172)/() 128/74     Weight: 54.4 kg (119 lb 14.9 oz)  Body mass index is 14.99 kg/m².    Intake/Output Summary (Last 24 hours) at 9/13/2020 2244  Last data filed at 9/13/2020 2100  Gross per 24 hour   Intake 600 ml   Output 1000 ml   Net -400 ml      Physical Exam  Constitutional:       Appearance: He is underweight. He is ill-appearing.      Comments: Cachetic and chronically ill appearing   HENT:      Head: Normocephalic and atraumatic.      Nose: Nose normal.      Mouth/Throat:      Comments: Poor dentition  Eyes:      Extraocular Movements: Extraocular movements intact.      Pupils: Pupils are equal, round, and reactive to light.   Cardiovascular:      Rate and Rhythm: Normal rate and regular rhythm.   Pulmonary:      Effort: Pulmonary effort is normal.      Breath sounds: Wheezing present.      Comments: Distant breath sound, with mild expiratory wheezing,  but able to be off BIPAP on NC this am  Abdominal:      General: Abdomen is flat. There is no distension.      Palpations: Abdomen is soft.      Tenderness: There is no abdominal tenderness. There is no guarding.   Musculoskeletal: Normal range of motion.   Skin:     General: Skin is warm and dry.      Capillary Refill: Capillary refill takes 2 to 3 seconds.   Neurological:      General: No focal deficit present.      Mental Status: He is oriented to person, place, and time.   Psychiatric:         Mood and Affect: Mood normal.         Thought Content: Thought content normal.         Significant Labs: All pertinent labs within the past 24 hours have been reviewed.    Significant Imaging: I have reviewed and interpreted all pertinent imaging results/findings within the past 24 hours.      Assessment/Plan:      * Acute on chronic respiratory failure with hypoxia and hypercapnia  Chronically on O2 at 3L via NC  Acute decompensation due to COPD exacerbation  Patient was taken off BIPAP this morning temporarily, but had to be placed back on BIPAP  Pt had worsening hypoxia after eating  Continue IV steroids, NEBS and BIPAP support  Will attempt to wean as tolerated    COPD exacerbation  As discussed above    Goals of care, counseling/discussion  Advance Care Planning   Will consult palliative care for goals of care discussions      Chronic respiratory failure  As discussed above    Tobacco abuse  Smoking cessation counseling after acute issues addressed    Pulmonary nodule  Noted on previous exam and is chronic  Will need follow up     Mild malnutrition  Supplement with Boost    Pulmonary artery hypertension  Noted     Essential hypertension  Continue amlodipine    COPD (chronic obstructive pulmonary disease)  As discussed above      VTE Risk Mitigation (From admission, onward)         Ordered     enoxaparin injection 40 mg  Every 24 hours      09/13/20 0401     IP VTE HIGH RISK PATIENT  Once      09/13/20 0401     Place  sequential compression device  Until discontinued      09/13/20 0400                Critical care time spent on the evaluation and treatment of severe organ dysfunction, review of pertinent labs and imaging studies, discussions with consulting providers and discussions with patient/family: 45 minutes.      Nyla Sandra MD  Department of Hospital Medicine   [facility]

## 2020-09-14 NOTE — SUBJECTIVE & OBJECTIVE
Interval History: No acute events overnight, reported breathing much easier. He has been stable on 4L NC. Tired and feels weak.    Review of Systems   Constitutional: Negative for chills and fever.   Respiratory: Positive for shortness of breath.    Cardiovascular: Negative for chest pain.     Objective:     Vital Signs (Most Recent):  Temp: 97.6 °F (36.4 °C) (09/14/20 1542)  Pulse: 93 (09/14/20 1542)  Resp: 20 (09/14/20 1542)  BP: 124/74 (09/14/20 1542)  SpO2: 96 % (09/14/20 1542) Vital Signs (24h Range):  Temp:  [96.3 °F (35.7 °C)-98 °F (36.7 °C)] 97.6 °F (36.4 °C)  Pulse:  [] 93  Resp:  [18-52] 20  SpO2:  [75 %-100 %] 96 %  BP: (111-149)/(58-89) 124/74     Weight: 54.4 kg (119 lb 14.9 oz)  Body mass index is 14.99 kg/m².    Intake/Output Summary (Last 24 hours) at 9/14/2020 1554  Last data filed at 9/14/2020 1100  Gross per 24 hour   Intake 1100 ml   Output 1650 ml   Net -550 ml      Physical Exam  Constitutional:       Appearance: He is underweight. He is ill-appearing.      Comments: Cachetic and chronically ill appearing   HENT:      Head: Normocephalic and atraumatic.      Nose: Nose normal.      Mouth/Throat:      Comments: Poor dentition  Eyes:      Extraocular Movements: Extraocular movements intact.      Pupils: Pupils are equal, round, and reactive to light.   Cardiovascular:      Rate and Rhythm: Normal rate and regular rhythm.   Pulmonary:      Effort: Pulmonary effort is normal.      Breath sounds: Wheezing present.      Comments: Distant breath sound, with mild expiratory wheezing, but able to be off BIPAP on NC this am  Abdominal:      General: Abdomen is flat. There is no distension.      Palpations: Abdomen is soft.      Tenderness: There is no abdominal tenderness. There is no guarding.   Musculoskeletal: Normal range of motion.   Skin:     General: Skin is warm and dry.      Capillary Refill: Capillary refill takes 2 to 3 seconds.   Neurological:      General: No focal deficit present.       Mental Status: He is oriented to person, place, and time.   Psychiatric:         Mood and Affect: Mood normal.         Thought Content: Thought content normal.         Significant Labs: All pertinent labs within the past 24 hours have been reviewed.    Significant Imaging: I have reviewed and interpreted all pertinent imaging results/findings within the past 24 hours.

## 2020-09-14 NOTE — PROGRESS NOTES
[facility]  Pulmonology  Progress Note    Patient Name: Enzo Wilson  MRN: [id number]  Admission Date: 9/12/2020  Hospital Length of Stay: 2 days  Code Status: Full Code  Attending Provider: Nyla Sandra MD  Primary Care Provider: Lewis Self MD   Principal Problem: Acute on chronic respiratory failure with hypoxia and hypercapnia    Subjective:     Interval History: No acute events overnight. On NC this morning.     Review of Systems   Constitutional: Negative for chills and fever.   Respiratory: Positive for shortness of breath.    Cardiovascular: Negative for chest pain.     Objective:     Vital Signs (Most Recent):  Temp: 96.3 °F (35.7 °C) (09/14/20 0730)  Pulse: 103 (09/14/20 0915)  Resp: (!) 27 (09/14/20 0915)  BP: 126/72 (09/14/20 0922)  SpO2: (!) 90 % (09/14/20 0915) Vital Signs (24h Range):  Temp:  [96.3 °F (35.7 °C)-98 °F (36.7 °C)] 96.3 °F (35.7 °C)  Pulse:  [] 103  Resp:  [15-52] 27  SpO2:  [75 %-100 %] 90 %  BP: (104-172)/(56-92) 126/72     Weight: 54.4 kg (119 lb 14.9 oz)  Body mass index is 14.99 kg/m².    Intake/Output Summary (Last 24 hours) at 9/14/2020 1006  Last data filed at 9/14/2020 0900  Gross per 24 hour   Intake 970 ml   Output 1625 ml   Net -655 ml      Physical Exam  Constitutional:       Appearance: He is underweight. He is ill-appearing.      Comments: Cachetic and chronically ill appearing   HENT:      Head: Normocephalic and atraumatic.      Nose: Nose normal.      Mouth/Throat:      Comments: Poor dentition  Eyes:      Extraocular Movements: Extraocular movements intact.      Pupils: Pupils are equal, round, and reactive to light.   Cardiovascular:      Rate and Rhythm: Normal rate and regular rhythm.   Pulmonary:      Effort: Pulmonary effort is normal.      Breath sounds: Wheezing present.      Comments: Distant breath sound, with mild expiratory wheezing, but able to be off BIPAP on NC this am  Abdominal:      General: Abdomen is flat. There is no  distension.      Palpations: Abdomen is soft.      Tenderness: There is no abdominal tenderness. There is no guarding.   Musculoskeletal: Normal range of motion.   Skin:     General: Skin is warm and dry.      Capillary Refill: Capillary refill takes 2 to 3 seconds.   Neurological:      General: No focal deficit present.      Mental Status: He is oriented to person, place, and time.   Psychiatric:         Mood and Affect: Mood normal.         Thought Content: Thought content normal.         Significant Labs: All pertinent labs within the past 24 hours have been reviewed.    Significant Imaging: I have reviewed and interpreted all pertinent imaging results/findings within the past 24 hours.    Assessment/Plan:     * Acute on chronic respiratory failure with hypoxia and hypercapnia  On home oxygen.  Repeat vbg with compensated pH.  Suspect copd exacerbation.  Wean steroids.     Goals of care, counseling/discussion  Doing surprisingly well with such poor lung function.  It is reasonable to have palliative care on board to help establish goals of care.      Tobacco abuse  Encourage smoking cessation.      Pulmonary nodule  Patient had repeat CT chest. Long history with extremely poor lung function.     Pulmonary artery hypertension  Suspect secondary to severe COPD. Awaiting Echo.      COPD (chronic obstructive pulmonary disease)  fev1 25% in 2007.  Unclear of home regimen.  Should be on laba/ics + lama +/- daliresp(can be started outpatient).  -Breo + Spiriva. Discharge with Breo + Spiriva or Trelegy) Needs triple therapy.       Patient needs outpatient pulmonary follow-up. He has significant COPD and was not a candidate for surgery or biopsy. Followed with Rad/Oncology.        Jluis Roe MD  Pulmonology  [facility]

## 2020-09-14 NOTE — SUBJECTIVE & OBJECTIVE
Interval History: No acute events overnight, reported breathing much easier and wanted to eat.    Review of Systems   Constitutional: Negative for chills and fever.   Respiratory: Positive for shortness of breath.    Cardiovascular: Negative for chest pain.     Objective:     Vital Signs (Most Recent):  Temp: 97.8 °F (36.6 °C) (09/13/20 1501)  Pulse: 91 (09/13/20 2100)  Resp: (!) 26 (09/13/20 2100)  BP: 128/74 (09/13/20 2100)  SpO2: 98 % (09/13/20 2100) Vital Signs (24h Range):  Temp:  [97.5 °F (36.4 °C)-98.7 °F (37.1 °C)] 97.8 °F (36.6 °C)  Pulse:  [] 91  Resp:  [15-38] 26  SpO2:  [75 %-100 %] 98 %  BP: (104-172)/() 128/74     Weight: 54.4 kg (119 lb 14.9 oz)  Body mass index is 14.99 kg/m².    Intake/Output Summary (Last 24 hours) at 9/13/2020 2244  Last data filed at 9/13/2020 2100  Gross per 24 hour   Intake 600 ml   Output 1000 ml   Net -400 ml      Physical Exam  Constitutional:       Appearance: He is underweight. He is ill-appearing.      Comments: Cachetic and chronically ill appearing   HENT:      Head: Normocephalic and atraumatic.      Nose: Nose normal.      Mouth/Throat:      Comments: Poor dentition  Eyes:      Extraocular Movements: Extraocular movements intact.      Pupils: Pupils are equal, round, and reactive to light.   Cardiovascular:      Rate and Rhythm: Normal rate and regular rhythm.   Pulmonary:      Effort: Pulmonary effort is normal.      Breath sounds: Wheezing present.      Comments: Distant breath sound, with mild expiratory wheezing, but able to be off BIPAP on NC this am  Abdominal:      General: Abdomen is flat. There is no distension.      Palpations: Abdomen is soft.      Tenderness: There is no abdominal tenderness. There is no guarding.   Musculoskeletal: Normal range of motion.   Skin:     General: Skin is warm and dry.      Capillary Refill: Capillary refill takes 2 to 3 seconds.   Neurological:      General: No focal deficit present.      Mental Status: He is  oriented to person, place, and time.   Psychiatric:         Mood and Affect: Mood normal.         Thought Content: Thought content normal.         Significant Labs: All pertinent labs within the past 24 hours have been reviewed.    Significant Imaging: I have reviewed and interpreted all pertinent imaging results/findings within the past 24 hours.

## 2020-09-14 NOTE — PROGRESS NOTES
[facility] Hospital Medicine  Progress Note    Patient Name: Enzo Wilson  MRN: [id number]  Patient Class: IP- Inpatient   Admission Date: 9/12/2020  Length of Stay: 2 days  Attending Physician: Nyla Sandra MD  Primary Care Provider: Lewis Self MD        Subjective:     Principal Problem:Acute on chronic respiratory failure with hypoxia and hypercapnia        HPI:  Enzo Wilson is a 80 y.o. male that (in part)  has a past medical history of CHF (congestive heart failure), COPD (chronic obstructive pulmonary disease), Hypertension, Respiratory distress, Squamous cell cancer of epiglottis, and Vocal cord polyps.  has a past surgical history that includes Tonsillectomy and Excision of mass of back (N/A, 6/10/2019). Presents to [facility] Emergency Department complaining of cough associated with shortness of breath.  Subacute onset 2 days ago with progressive worsening.  Worsened with ambulation and minimal exertion.  Minimally relieved with rest.  On home oxygen with nasal cannula at 3 L. was given DuoNebs and supplemental oxygen by EMS with initial improvement in oxygenation followed by a subsequent decline again once he was in the emergency department.  Denies recent travel or sick contacts.  No COVID-19 exposure.  Smokes cigarettes.  History of COPD.  Reports compliance with home medication regimen.  Denies hemoptysis.  Positive for wheezing.  Denies stridor or night sweats.  Has dyspnea with exertion.  Moderate to severe intensity.  Daily frequency.  Constant duration.    In the emergency department routine laboratory studies, chest x-ray, EKG, cardiac enzymes were obtained.  Unable to maintain oxygenation with nasal cannula.  Non-rebreather given for short time.  ABG performed which was concerning for pH of 7.2 and a pCO2 of 99.1, and PO2 of 62.  Was given empiric antibiotics, DuoNeb treatment, steroids, and started on BiPAP.  Being admitted for acute on  chronic hypoxemic and hypercapnic respiratory failure secondary to COPD exacerbation.    Hospital medicine has been asked to admit to inpatient for further evaluation and treatment.     Overview/Hospital Course:  Mr. Wilson presented with worsening SOB. Admitted to ICU for acute on chronic respiratory failure secondary to COPD exacerbation with need for BIPAP support. Treatment initiated with IV steroids, NEBS, O2, and empiric Levaquin. Pulmonary consulted.    Interval History: No acute events overnight, reported breathing much easier. He has been stable on 4L NC. Tired and feels weak.    Review of Systems   Constitutional: Negative for chills and fever.   Respiratory: Positive for shortness of breath.    Cardiovascular: Negative for chest pain.     Objective:     Vital Signs (Most Recent):  Temp: 97.6 °F (36.4 °C) (09/14/20 1542)  Pulse: 93 (09/14/20 1542)  Resp: 20 (09/14/20 1542)  BP: 124/74 (09/14/20 1542)  SpO2: 96 % (09/14/20 1542) Vital Signs (24h Range):  Temp:  [96.3 °F (35.7 °C)-98 °F (36.7 °C)] 97.6 °F (36.4 °C)  Pulse:  [] 93  Resp:  [18-52] 20  SpO2:  [75 %-100 %] 96 %  BP: (111-149)/(58-89) 124/74     Weight: 54.4 kg (119 lb 14.9 oz)  Body mass index is 14.99 kg/m².    Intake/Output Summary (Last 24 hours) at 9/14/2020 1554  Last data filed at 9/14/2020 1100  Gross per 24 hour   Intake 1100 ml   Output 1650 ml   Net -550 ml      Physical Exam  Constitutional:       Appearance: He is underweight. He is ill-appearing.      Comments: Cachetic and chronically ill appearing   HENT:      Head: Normocephalic and atraumatic.      Nose: Nose normal.      Mouth/Throat:      Comments: Poor dentition  Eyes:      Extraocular Movements: Extraocular movements intact.      Pupils: Pupils are equal, round, and reactive to light.   Cardiovascular:      Rate and Rhythm: Normal rate and regular rhythm.   Pulmonary:      Effort: Pulmonary effort is normal.      Breath sounds: Wheezing present.      Comments: Distant  breath sound, with mild expiratory wheezing, but able to be off BIPAP on NC this am  Abdominal:      General: Abdomen is flat. There is no distension.      Palpations: Abdomen is soft.      Tenderness: There is no abdominal tenderness. There is no guarding.   Musculoskeletal: Normal range of motion.   Skin:     General: Skin is warm and dry.      Capillary Refill: Capillary refill takes 2 to 3 seconds.   Neurological:      General: No focal deficit present.      Mental Status: He is oriented to person, place, and time.   Psychiatric:         Mood and Affect: Mood normal.         Thought Content: Thought content normal.         Significant Labs: All pertinent labs within the past 24 hours have been reviewed.    Significant Imaging: I have reviewed and interpreted all pertinent imaging results/findings within the past 24 hours.      Assessment/Plan:      * Acute on chronic respiratory failure with hypoxia and hypercapnia  Chronically on O2 at 3L via NC  Acute decompensation due to COPD exacerbation  Patient was taken off BIPAP this morning temporarily, but had to be placed back on BIPAP on 9/13  Continued IV steroids, NEBS and BIPAP support  Weaned off BIPAP overnight and currently doing well on 4L via NC  Will begin weaning down steroids today   Stable for step down today  Begin mobilization with PT/OT and  for discharge planning  Discharge pending clinical progress and rehab recommendations    COPD exacerbation  As discussed above    Goals of care, counseling/discussion  Advance Care Planning   Will consult palliative care for goals of care discussions      Chronic respiratory failure  As discussed above    Tobacco abuse  Smoking cessation counseling after acute issues addressed    Pulmonary nodule  Noted on previous exam and is chronic  Will need follow up     Mild malnutrition  Supplement with Boost    Pulmonary artery hypertension  Noted     Essential hypertension  Continue amlodipine    COPD  (chronic obstructive pulmonary disease)  As discussed above      VTE Risk Mitigation (From admission, onward)         Ordered     enoxaparin injection 40 mg  Every 24 hours      09/13/20 0401     IP VTE HIGH RISK PATIENT  Once      09/13/20 0401     Place sequential compression device  Until discontinued      09/13/20 0401                Critical care time spent on the evaluation and treatment of severe organ dysfunction, review of pertinent labs and imaging studies, discussions with consulting providers and discussions with patient/family: 35  minutes.      Nyla Sandra MD  Department of Hospital Medicine   [facility]

## 2020-09-14 NOTE — PLAN OF CARE
Problem: Occupational Therapy Goal  Goal: Occupational Therapy Goal  Description: Goals to be met by: 09/28/20     Patient will increase functional independence with ADLs by performing:    UE Dressing with Set-up Assistance.  LE Dressing with Supervision.  Grooming while standing at sink with Supervision.  Toileting from bedside commode with Supervision for hygiene and clothing management.   Supine to sit with Supervision.  Step transfer with Supervision  Toilet transfer to bedside commode with Supervision.  Upper extremity exercise program x12 reps per handout, with supervision.    Outcome: Ongoing, Progressing     SBA bed mobility; MIN A with LOB x3 with sidesteps L<>R with no DME. SpO2 seated EOB 87-90% on 3L- nurse gave OK for O2 NC from 3-5L. Increased to 4L: spO2 86-92% with short mobility. OT d/c rec pending ongoing further pt participation.

## 2020-09-14 NOTE — PT/OT/SLP EVAL
Physical Therapy Evaluation    Patient Name:  Enzo Wilson   MRN:  [id number]    Recommendations:     Discharge Recommendations:  (TBD)   Discharge Equipment Recommendations: walker, rolling, bedside commode   Barriers to discharge home: decreased functional mobility, pt currently in ICU, inaccessibility for entrance into home    Assessment:     Enzo Wilson is a 80 y.o. male admitted with a medical diagnosis of Acute on chronic respiratory failure with hypoxia and hypercapnia.  He presents with the following impairments/functional limitations:  weakness, gait instability, impaired cardiopulmonary response to activity, decreased lower extremity function, impaired balance, impaired endurance, decreased safety awareness, decreased coordination, impaired functional mobilty.    Pt is pleasant and motivated to work with PT. Pt required max VC for purse lip breathing during PT/OT treatment to increase SpO2 levels with activity.    Rehab Prognosis: Good; patient would benefit from acute skilled PT services to address these deficits and reach maximum level of function.    Recent Surgery: * No surgery found *      Plan:     During this hospitalization, patient to be seen 5 x/week to address the identified rehab impairments via gait training, therapeutic activities, therapeutic exercises, neuromuscular re-education and progress toward the following goals:    · Plan of Care Expires:  09/28/20    Subjective     Chief Complaint: none  Patient/Family Comments/goals: Pt states he uses 3.5L oxygen at home.  Pain/Comfort:  Pain Rating 1: 0/10    Patients cultural, spiritual, Mosque conflicts given the current situation:      Living Environment:  Pt lives with his sister in a 2SH with the bedrooms on the 1st floor. Pt states he has 9 ERIKA home with (B) handrails. Pt appears mildly confused with home accessibility questions and unclear of the validity of the number of stairs to enter home.  Prior to admission, patients  level of function was modified independent with no AD or equipment. Pt primarily walks in his home. Pt still currently drives and does not work. Pt states he uses 3.5L oxygen at home.  Equipment used at home: oxygen.  DME owned (not currently used): none.  Upon discharge, patient will have assistance from his self and sister.    Objective:     Communicated with ILDA Wynn prior to session.  Patient found HOB elevated with pulse ox (continuous), peripheral IV, 3L oxygen, blood pressure cuff  upon PT entry to room.    General Precautions: Standard, respiratory, fall   Orthopedic Precautions:N/A   Braces: N/A     Exams:  · Cognitive Exam: Patient is oriented to Person, Place, Time and Situation  · Postural Exam: Patient presented with the following abnormalities:    · -       No postural abnormalities identified  · Sensation:    · -       Intact light/touch (B) LE  · Skin Integrity/Edema:      · -       Skin integrity: Visible skin intact  · -       Edema: None noted (B) LE  · (B) LE ROM: WNL  · (B) LE Strength:  · Hip flexion: 5/5  · Knee extension/flexion: 5/5  · Ankle PF/DF: 5/5    Functional Mobility:  · Bed Mobility:     · Scooting: stand by assistance  · Supine to Sit: stand by assistance  · Transfers:     · Sit to Stand: contact guard assistance with no AD  · Bed to Chair: contact guard assistance with no AD using Step Transfer  · Gait: Pt ambulated 6 side steps to the right, 6 side steps to the left, and 5 feet forward CGA-Yuliana with no AD. Pt demonstrated reciprocal gait with a narrow SAMIA, decreased foot clearance, decreased weight shifting, and decreased step length. Pt had 2 LOB with side stepping to the left and 1 LOB with side stepping to the right. Pt required Yuliana to recover. Will assess gait with RW when able.  · Balance: Good static sitting balance, Fair (+) static standing balance, Fair (-) dynamic standing balance with min LOB as stated in gait assessment.     Vitals pre-treatment:  HR: 114 bpm  BP:  138/72 mmHg (supine)  SpO2: 92% on 3L oxygen  RR: 29 bpm    Vitals during treatment:  HR: 105 bpm  BP: 138/94 mmHg (seated)  SpO2: 86-88% on 3L oxygen. RN notified and ok'd PT to increase oxygen level to 4L. SpO2 increased to 90-93% on 4L oxygen. Oxygen decreased back to 3L post treatment with SpO2 range WNL.  RR: 36 bpm      Therapeutic Activities and Exercises:  Pt performed supine (B) LE therex: ankle pumps AROM x10, heel slides AROM x10, and hip abduction AROM x10.    AM-PAC 6 CLICK MOBILITY  Total Score:20     Patient left reclined up in chair with 3L NC oxygen, all lines intact, call button in reach and RN Tianna notified.    GOALS:   Multidisciplinary Problems     Physical Therapy Goals        Problem: Physical Therapy Goal    Goal Priority Disciplines Outcome Goal Variances Interventions   Physical Therapy Goal     PT, PT/OT Ongoing, Progressing     Description: Goals to be met by:     Patient will increase functional independence with mobility by performin. Supine to sit with Modified [place]  2. Sit to supine with Modified [place]  3. Sit to stand transfer with Modified [place] with or without RW.  4. Bed to chair transfer with Modified [place] using with or without Rolling Walker and oxygen.  5. Gait  x 250 feet with Modified [place] using with or without Rolling Walker and oxygen.  6. Ascend/descend 1 flight of stairs with bilateral Handrails Modified [place] and oxygen.  7. Lower extremity exercise program x20 reps per handout, with independence                     History:     Past Medical History:   Diagnosis Date    CHF (congestive heart failure)     COPD (chronic obstructive pulmonary disease)     confirmed by PFT's 3/2007; oxygen dependent    Hypertension     Respiratory distress 10/28/2015    Squamous cell cancer of epiglottis 2014    Vocal cord polyps        Past Surgical History:   Procedure Laterality Date    EXCISION OF MASS OF BACK N/A  6/10/2019    Procedure: EXCISION, MASS, BACK;  Surgeon: Canelo Lockwood MD;  Location: [facility];  Service: General;  Laterality: N/A;    TONSILLECTOMY         Time Tracking:     PT Received On: 09/14/20  PT Start Time: 1033     PT Stop Time: 1056  PT Total Time (min): 23 min     Billable Minutes: Evaluation 15 and Therapeutic Exercise 8      Nadine Farooq, LAURA  09/14/2020

## 2020-09-14 NOTE — NURSING
Pt transferred to room with ICU nurse Tianna at bedside with belongings. On 3L O2. NAD, call light in reach. Will continue to monitor.

## 2020-09-14 NOTE — ASSESSMENT & PLAN NOTE
Doing surprisingly well with such poor lung function.  It is reasonable to have palliative care on board to help establish goals of care.

## 2020-09-14 NOTE — PLAN OF CARE
Pt free from falls/injury. AAOx4, NAD, no complaints of pain. Remains on 3L O2. Pt repositions with no assistance. Call light in reach. Will continue to monitor.     Problem: Fall Injury Risk  Goal: Absence of Fall and Fall-Related Injury  Outcome: Ongoing, Progressing     Problem: Adjustment to Illness COPD (Chronic Obstructive Pulmonary Disease)  Goal: Optimal Chronic Illness Coping  Outcome: Ongoing, Progressing     Problem: Functional Ability Impaired COPD (Chronic Obstructive Pulmonary Disease)  Goal: Optimal Level of Functional [place]  Outcome: Ongoing, Progressing     Problem: Oral Intake Inadequate COPD (Chronic Obstructive Pulmonary Disease)  Goal: Improved Nutrition Intake  Outcome: Ongoing, Progressing     Problem: Infection COPD (Chronic Obstructive Pulmonary Disease)  Goal: Absence of Infection Signs/Symptoms  Outcome: Ongoing, Progressing     Problem: Respiratory Compromise COPD (Chronic Obstructive Pulmonary Disease)  Goal: Effective Oxygenation and Ventilation  Outcome: Ongoing, Progressing     Problem: Adult Inpatient Plan of Care  Goal: Plan of Care Review  Outcome: Ongoing, Progressing  Goal: Patient-Specific Goal (Individualization)  Outcome: Ongoing, Progressing  Goal: Absence of Hospital-Acquired Illness or Injury  Outcome: Ongoing, Progressing  Goal: Optimal Comfort and Wellbeing  Outcome: Ongoing, Progressing  Goal: Readiness for Transition of Care  Outcome: Ongoing, Progressing  Goal: Rounds/Family Conference  Outcome: Ongoing, Progressing     Problem: Skin Injury Risk Increased  Goal: Skin Health and Integrity  Outcome: Ongoing, Progressing

## 2020-09-14 NOTE — PT/OT/SLP EVAL
"Occupational Therapy   Evaluation    Name: Enzo Wilson  MRN: [id number]  Admitting Diagnosis:  Acute on chronic respiratory failure with hypoxia and hypercapnia      Recommendations:     Discharge Recommendations: (ongoing assessment pending further pt participation)  Discharge Equipment Recommendations:  bedside commode, bath bench(pulse oximeter, likely a RW)  Barriers to discharge:  (spO2 desaturates with minimal EOB/OOB activity; LOB x3 with no DME and sidesteps today- first time OOB in 3 days)    Assessment:     Enzo Wilson is a 80 y.o. male with a medical diagnosis of Acute on chronic respiratory failure with hypoxia and hypercapnia.  Performance deficits affecting function: weakness, impaired endurance, decreased coordination, impaired self care skills, impaired functional mobilty, gait instability, decreased safety awareness, impaired cardiopulmonary response to activity, impaired balance, decreased lower extremity function.      SBA bed mobility; MIN A with LOB x3 with sidesteps L<>R with no DME. SpO2 seated EOB 87-90% on 3L- nurse gave OK for O2 NC from 3-5L. Increased to 4L: spO2 86-92% with short mobility. OT d/c rec pending ongoing further pt participation    Rehab Prognosis: Fair +; patient would benefit from acute skilled OT services to address these deficits and reach maximum level of function.       Plan:     Patient to be seen (5-6x/week) to address the above listed problems via self-care/home management, therapeutic activities, therapeutic exercises  · Plan of Care Expires: 09/28/20  · Plan of Care Reviewed with: patient    Subjective     Chief Complaint: "I haven't walked in 3 days"  Patient/Family Comments/goals: happy to sit up in the chair     Occupational Profile:   Living Environment: Pt lives with his sister in a 2 story home with ~9 steps to enter and B/L HR. Pt's bed and bath are on the first level. Bathroom set-up: tub/shower combo.   Previous level of function: Pt was " independent with functional mobility and ADLs. Pt reports 0 falls within the last 3 months. Pt wears 3L O2 at baseline.   Roles and Routines: drives, likes to sit on the porch   Equipment Used at Home:  oxygen  Assistance upon Discharge: sister?     Pain/Comfort:  · Pain Rating 1: 0/10    Patients cultural, spiritual, Hindu conflicts given the current situation: no    Objective:     Communicated with: nurseTianna, prior to session.  Patient found HOB elevated with blood pressure cuff, peripheral IV, oxygen, telemetry, pulse ox (continuous) upon OT entry to room.    General Precautions: Standard, fall, respiratory   Orthopedic Precautions:N/A   Braces: N/A     Occupational Performance:    Bed Mobility:    · Patient completed Scooting/Bridging with stand by assistance  · Patient completed Supine to Sit with stand by assistance    Functional Mobility/Transfers:  · Patient completed Sit <> Stand Transfer with contact guard assistance  with  no assistive device   · Patient completed Bed <> Chair Transfer using Step Transfer technique with minimum assistance with no assistive device  · Functional Mobility: Pt took sidesteps L to R with no DME- 2 LOB to the L and 1 LOB to the R with MIN A to recover. Pt then took steps forward with CGA/MIN A for transfer into the chair.     Vitals:   · Supine: 101 bpm, 138/72, 92% on 3L   · Seated EOB: spO2 seated EOB 87-90% on 3L; Increased to 4L: spO2 86-92% with short mobility  · Seated in the chair: 94%,102 bpm (doing BLE exercises with PT)    Activities of Daily Living:  · Grooming: set-up with kleenex while seated EOB    · Lower Body Dressing: stand by assistance for dynamic sitting balance while pt changed out of his socks into the hospital socks at EOB    Cognitive/Visual Perceptual:  Cognitive/Psychosocial Skills:     -       Oriented to: Person, Place and Time   -       Follows Commands/attention:Follows most simple commands  -       Communication: clear/fluent  -        Memory: presented with some poor immediate recall  -       Safety awareness/insight to disability: impaired   -       Mood/Affect/Coping skills/emotional control: Cooperative and Pleasant  Visual/Perceptual:      -Intact  R/L discrimination      Physical Exam:  Balance:    -       seated: SBA; static stand: SBA; dynamic stand: MIN A  Postural examination/scapula alignment:    -       Rounded shoulders  Skin integrity: Visible skin intact  Edema:  None noted  Sensation:    -       Intact  light/touch BUE  Dominant hand:    -       Right  Upper Extremity Range of Motion:     -       Right Upper Extremity: WFL  -       Left Upper Extremity: WFL  Upper Extremity Strength:    -       Right Upper Extremity: WFL  -       Left Upper Extremity: WFL   Strength:    -       Right Upper Extremity: WFL  -       Left Upper Extremity: WFL  Fine Motor Coordination:    -       Intact  Left hand, manipulation of objects and Right hand, manipulation of objects  Gross motor coordination:   impaired 2* weakness    AMPAC 6 Click ADL:  AMPAC Total Score: 21    Treatment & Education:  · Pt educated on OT role/POC.   · Importance of OOB activity with staff assistance.  · Encouraged pt to get up to the chair throughout the day, daily with staff assist and especially for meals   · edu with demo on pursed lip breathing   · edu pt to do 3x7 reps of elbow flex/ext daily and ankle pumps throughout the day   · Safety during functional t/f and mobility   · Multiple self-care tasks/functional mobility completed- assistance level noted above   · All questions/concerns answered within OT scope of practice     Education:    Patient left up in the chair reclined with BLE offloaded with all lines intact, call button in reach, nurseTianna, notified and PT present. Pt left on 4L and nurse notified.     GOALS:   Multidisciplinary Problems     Occupational Therapy Goals        Problem: Occupational Therapy Goal    Goal Priority Disciplines Outcome  Interventions   Occupational Therapy Goal     OT, PT/OT Ongoing, Progressing    Description: Goals to be met by: 09/28/20     Patient will increase functional independence with ADLs by performing:    UE Dressing with Set-up Assistance.  LE Dressing with Supervision.  Grooming while standing at sink with Supervision.  Toileting from bedside commode with Supervision for hygiene and clothing management.   Supine to sit with Supervision.  Step transfer with Supervision  Toilet transfer to bedside commode with Supervision.  Upper extremity exercise program x12 reps per handout, with supervision.                     History:     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    Vocal cord polyps        Past Surgical History:   Procedure Laterality Date    EXCISION OF MASS OF BACK N/A 6/10/2019    Procedure: EXCISION, MASS, BACK;  Surgeon: Canelo Lockwood MD;  [facility];  Service: General;  Laterality: N/A;    TONSILLECTOMY         Time Tracking:     OT Date of Treatment: 09/14/20  OT Start Time: 1031  OT Stop Time: 1051  OT Total Time (min): 20 min    Billable Minutes:Evaluation 15 min  Total Time 20 min (co-eval with PT)    Stefanie Portillo OT  9/14/2020

## 2020-09-14 NOTE — SUBJECTIVE & OBJECTIVE
Interval History: No acute events overnight. On NC this morning.     Review of Systems   Constitutional: Negative for chills and fever.   Respiratory: Positive for shortness of breath.    Cardiovascular: Negative for chest pain.     Objective:     Vital Signs (Most Recent):  Temp: 96.3 °F (35.7 °C) (09/14/20 0730)  Pulse: 103 (09/14/20 0915)  Resp: (!) 27 (09/14/20 0915)  BP: 126/72 (09/14/20 0922)  SpO2: (!) 90 % (09/14/20 0915) Vital Signs (24h Range):  Temp:  [96.3 °F (35.7 °C)-98 °F (36.7 °C)] 96.3 °F (35.7 °C)  Pulse:  [] 103  Resp:  [15-52] 27  SpO2:  [75 %-100 %] 90 %  BP: (104-172)/(56-92) 126/72     Weight: 54.4 kg (119 lb 14.9 oz)  Body mass index is 14.99 kg/m².    Intake/Output Summary (Last 24 hours) at 9/14/2020 1006  Last data filed at 9/14/2020 0900  Gross per 24 hour   Intake 970 ml   Output 1625 ml   Net -655 ml      Physical Exam  Constitutional:       Appearance: He is underweight. He is ill-appearing.      Comments: Cachetic and chronically ill appearing   HENT:      Head: Normocephalic and atraumatic.      Nose: Nose normal.      Mouth/Throat:      Comments: Poor dentition  Eyes:      Extraocular Movements: Extraocular movements intact.      Pupils: Pupils are equal, round, and reactive to light.   Cardiovascular:      Rate and Rhythm: Normal rate and regular rhythm.   Pulmonary:      Effort: Pulmonary effort is normal.      Breath sounds: Wheezing present.      Comments: Distant breath sound, with mild expiratory wheezing, but able to be off BIPAP on NC this am  Abdominal:      General: Abdomen is flat. There is no distension.      Palpations: Abdomen is soft.      Tenderness: There is no abdominal tenderness. There is no guarding.   Musculoskeletal: Normal range of motion.   Skin:     General: Skin is warm and dry.      Capillary Refill: Capillary refill takes 2 to 3 seconds.   Neurological:      General: No focal deficit present.      Mental Status: He is oriented to person, place, and  time.   Psychiatric:         Mood and Affect: Mood normal.         Thought Content: Thought content normal.         Significant Labs: All pertinent labs within the past 24 hours have been reviewed.    Significant Imaging: I have reviewed and interpreted all pertinent imaging results/findings within the past 24 hours.

## 2020-09-14 NOTE — PLAN OF CARE
Received resting AOx's 3 denies any discomforts able to tolerate nasal canula without being placed on Bipap. Respirations unlabored voiding without problems taking fluids .

## 2020-09-15 PROBLEM — Z51.5 PALLIATIVE CARE ENCOUNTER: Status: ACTIVE | Noted: 2020-09-13

## 2020-09-15 LAB
ANION GAP SERPL CALC-SCNC: 5 MMOL/L (ref 8–16)
BASOPHILS # BLD AUTO: 0 K/UL (ref 0–0.2)
BASOPHILS NFR BLD: 0 % (ref 0–1.9)
BUN SERPL-MCNC: 30 MG/DL (ref 8–23)
CALCIUM SERPL-MCNC: 9 MG/DL (ref 8.7–10.5)
CHLORIDE SERPL-SCNC: 90 MMOL/L (ref 95–110)
CO2 SERPL-SCNC: 44 MMOL/L (ref 23–29)
CREAT SERPL-MCNC: 0.8 MG/DL (ref 0.5–1.4)
DIFFERENTIAL METHOD: ABNORMAL
EOSINOPHIL # BLD AUTO: 0 K/UL (ref 0–0.5)
EOSINOPHIL NFR BLD: 0 % (ref 0–8)
ERYTHROCYTE [DISTWIDTH] IN BLOOD BY AUTOMATED COUNT: 15.8 % (ref 11.5–14.5)
EST. GFR  (AFRICAN AMERICAN): >60 ML/MIN/1.73 M^2
EST. GFR  (NON AFRICAN AMERICAN): >60 ML/MIN/1.73 M^2
GLUCOSE SERPL-MCNC: 102 MG/DL (ref 70–110)
HCT VFR BLD AUTO: 41.2 % (ref 40–54)
HGB BLD-MCNC: 12.5 G/DL (ref 14–18)
IMM GRANULOCYTES # BLD AUTO: 0.04 K/UL (ref 0–0.04)
IMM GRANULOCYTES NFR BLD AUTO: 0.5 % (ref 0–0.5)
LYMPHOCYTES # BLD AUTO: 0.3 K/UL (ref 1–4.8)
LYMPHOCYTES NFR BLD: 3.3 % (ref 18–48)
MAGNESIUM SERPL-MCNC: 2.1 MG/DL (ref 1.6–2.6)
MCH RBC QN AUTO: 27.1 PG (ref 27–31)
MCHC RBC AUTO-ENTMCNC: 30.3 G/DL (ref 32–36)
MCV RBC AUTO: 89 FL (ref 82–98)
MONOCYTES # BLD AUTO: 0.4 K/UL (ref 0.3–1)
MONOCYTES NFR BLD: 5.1 % (ref 4–15)
NEUTROPHILS # BLD AUTO: 6.8 K/UL (ref 1.8–7.7)
NEUTROPHILS NFR BLD: 91.1 % (ref 38–73)
NRBC BLD-RTO: 0 /100 WBC
PHOSPHATE SERPL-MCNC: 2 MG/DL (ref 2.7–4.5)
PLATELET # BLD AUTO: 169 K/UL (ref 150–350)
PMV BLD AUTO: 9.4 FL (ref 9.2–12.9)
POTASSIUM SERPL-SCNC: 4.2 MMOL/L (ref 3.5–5.1)
RBC # BLD AUTO: 4.61 M/UL (ref 4.6–6.2)
SODIUM SERPL-SCNC: 139 MMOL/L (ref 136–145)
WBC # BLD AUTO: 7.47 K/UL (ref 3.9–12.7)

## 2020-09-15 PROCEDURE — 94761 N-INVAS EAR/PLS OXIMETRY MLT: CPT | Mod: HCNC

## 2020-09-15 PROCEDURE — 25000242 PHARM REV CODE 250 ALT 637 W/ HCPCS: Mod: HCNC | Performed by: HOSPITALIST

## 2020-09-15 PROCEDURE — 27000221 HC OXYGEN, UP TO 24 HOURS: Mod: HCNC

## 2020-09-15 PROCEDURE — 25000003 PHARM REV CODE 250: Mod: HCNC | Performed by: HOSPITALIST

## 2020-09-15 PROCEDURE — 94640 AIRWAY INHALATION TREATMENT: CPT | Mod: HCNC

## 2020-09-15 PROCEDURE — 85025 COMPLETE CBC W/AUTO DIFF WBC: CPT | Mod: HCNC

## 2020-09-15 PROCEDURE — 97530 THERAPEUTIC ACTIVITIES: CPT | Mod: HCNC

## 2020-09-15 PROCEDURE — 63600175 PHARM REV CODE 636 W HCPCS: Mod: HCNC | Performed by: HOSPITALIST

## 2020-09-15 PROCEDURE — 97116 GAIT TRAINING THERAPY: CPT | Mod: HCNC

## 2020-09-15 PROCEDURE — 97110 THERAPEUTIC EXERCISES: CPT | Mod: HCNC

## 2020-09-15 PROCEDURE — 11000001 HC ACUTE MED/SURG PRIVATE ROOM: Mod: HCNC

## 2020-09-15 PROCEDURE — 99497 PR ADVNCD CARE PLAN 30 MIN: ICD-10-PCS | Mod: HCNC,,, | Performed by: NURSE PRACTITIONER

## 2020-09-15 PROCEDURE — S4991 NICOTINE PATCH NONLEGEND: HCPCS | Mod: HCNC | Performed by: HOSPITALIST

## 2020-09-15 PROCEDURE — 80048 BASIC METABOLIC PNL TOTAL CA: CPT | Mod: HCNC

## 2020-09-15 PROCEDURE — 84100 ASSAY OF PHOSPHORUS: CPT | Mod: HCNC

## 2020-09-15 PROCEDURE — 99497 ADVNCD CARE PLAN 30 MIN: CPT | Mod: HCNC,,, | Performed by: NURSE PRACTITIONER

## 2020-09-15 PROCEDURE — 83735 ASSAY OF MAGNESIUM: CPT | Mod: HCNC

## 2020-09-15 PROCEDURE — 36415 COLL VENOUS BLD VENIPUNCTURE: CPT | Mod: HCNC

## 2020-09-15 RX ORDER — METHYLPREDNISOLONE SOD SUCC 125 MG
80 VIAL (EA) INJECTION EVERY 12 HOURS
Status: DISCONTINUED | OUTPATIENT
Start: 2020-09-15 | End: 2020-09-16

## 2020-09-15 RX ADMIN — POTASSIUM PHOSPHATE, MONOBASIC 500 MG: 500 TABLET, SOLUBLE ORAL at 11:09

## 2020-09-15 RX ADMIN — IPRATROPIUM BROMIDE AND ALBUTEROL SULFATE 3 ML: .5; 3 SOLUTION RESPIRATORY (INHALATION) at 01:09

## 2020-09-15 RX ADMIN — METHYLPREDNISOLONE SODIUM SUCCINATE 80 MG: 125 INJECTION, POWDER, FOR SOLUTION INTRAMUSCULAR; INTRAVENOUS at 09:09

## 2020-09-15 RX ADMIN — IPRATROPIUM BROMIDE AND ALBUTEROL SULFATE 3 ML: .5; 3 SOLUTION RESPIRATORY (INHALATION) at 08:09

## 2020-09-15 RX ADMIN — DOCUSATE SODIUM 50 MG AND SENNOSIDES 8.6 MG 1 TABLET: 8.6; 5 TABLET, FILM COATED ORAL at 09:09

## 2020-09-15 RX ADMIN — FAMOTIDINE 20 MG: 20 TABLET ORAL at 09:09

## 2020-09-15 RX ADMIN — ENOXAPARIN SODIUM 40 MG: 40 INJECTION SUBCUTANEOUS at 06:09

## 2020-09-15 RX ADMIN — NICOTINE 1 PATCH: 21 PATCH, EXTENDED RELEASE TRANSDERMAL at 09:09

## 2020-09-15 RX ADMIN — AMLODIPINE BESYLATE 10 MG: 5 TABLET ORAL at 09:09

## 2020-09-15 RX ADMIN — LEVOFLOXACIN 750 MG: 750 INJECTION, SOLUTION INTRAVENOUS at 05:09

## 2020-09-15 NOTE — NURSING
"MEWS Monitoring: Chart check completed, mews 3 due to level of consciousness. "Responds to voice" charted by RT. Rounded at 2145, pt alert and appropriate. MEWS now 2.    "

## 2020-09-15 NOTE — PROGRESS NOTES
"To patient's room to discuss patient managing h__ care at home.      TN Role Explained.  Patient identified by using 2 identifiers:  Name and date of birth    Patient stated that niece Gisell WILL HELP AT HOME WITH him  RECOVERY.       TN reviewed with patient contents of "[employer] Packet".      TN name and contact info placed on the communication board along with disposition, approximate date of DC, next steps and emergency contact    Preferred Pharmacy:  [id number] - [place] [place]  [address] [place] [address]  [place] [address]  Phone: [phone] Fax: [phone]    [id number]  [place]  [address]  [place] [address]  Phone: [phone] Fax: [phone]      Preferred appointment time: morning  "

## 2020-09-15 NOTE — CONSULTS
[facility]  Palliative Medicine  Consult Note    Patient Name: Enzo Wilson  MRN: [id number]  Admission Date: 9/12/2020  Hospital Length of Stay: 3 days  Code Status: Full Code   Attending Provider: Cem Raymond MD  Consulting Provider: Christy Cotto NP  Primary Care Physician: Lewis Self MD  Principal Problem:Acute on chronic respiratory failure with hypoxia and hypercapnia    Patient information was obtained from patient, past medical records and ER records.      Inpatient consult to Palliative Care  Consult performed by: Christy Cotto NP  Consult ordered by: Nyla Sandra MD  Reason for consult: ACP/AD        Assessment/Plan:     See note below    Plan:  -continue current treatment  -confirmed Full code  -completed new HPOA and Living Will as the previous ones were invalid (in blue folder to be scanned in Epic)    Thank you for your consult. I will follow-up with patient. Please contact us if you have any additional questions.    Subjective:     HPI:   Principal Problem:Acute on chronic respiratory failure with hypoxia and hypercapnia           HPI:  Enzo Wilson is a 80 y.o. male that (in part)  has a past medical history of CHF (congestive heart failure), COPD (chronic obstructive pulmonary disease), Hypertension, Respiratory distress, Squamous cell cancer of epiglottis, and Vocal cord polyps.  has a past surgical history that includes Tonsillectomy and Excision of mass of back (N/A, 6/10/2019). Presents to [facility] Emergency Department complaining of cough associated with shortness of breath.  Subacute onset 2 days ago with progressive worsening.  Worsened with ambulation and minimal exertion.  Minimally relieved with rest.  On home oxygen with nasal cannula at 3 L. was given DuoNebs and supplemental oxygen by EMS with initial improvement in oxygenation followed by a subsequent decline again once he was in the emergency department.   Denies recent travel or sick contacts.  No COVID-19 exposure.  Smokes cigarettes.  History of COPD.  Reports compliance with home medication regimen.  Denies hemoptysis.  Positive for wheezing.  Denies stridor or night sweats.  Has dyspnea with exertion.  Moderate to severe intensity.  Daily frequency.  Constant duration.     In the emergency department routine laboratory studies, chest x-ray, EKG, cardiac enzymes were obtained.  Unable to maintain oxygenation with nasal cannula.  Non-rebreather given for short time.  ABG performed which was concerning for pH of 7.2 and a pCO2 of 99.1, and PO2 of 62.  Was given empiric antibiotics, DuoNeb treatment, steroids, and started on BiPAP.  Being admitted for acute on chronic hypoxemic and hypercapnic respiratory failure secondary to COPD exacerbation.     Hospital medicine has been asked to admit to inpatient for further evaluation and treatment.      Overview/Hospital Course:  Mr. Wilson presented with worsening SOB. Admitted to ICU for acute on chronic respiratory failure secondary to COPD exacerbation with need for BIPAP support. Treatment initiated with IV steroids, NEBS, O2, and empiric Levaquin. Pulmonary consulted.     Interval History: No acute events overnight, reported breathing much easier. He has been stable on 4L NC. Tired and feels weak.    Hospital Course:  No notes on file    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    Vocal cord polyps        Past Surgical History:   Procedure Laterality Date    EXCISION OF MASS OF BACK N/A 6/10/2019    Procedure: EXCISION, MASS, BACK;  Surgeon: Canelo Lockwood MD;  Location: [facility];  Service: General;  Laterality:  N/A;    TONSILLECTOMY         Review of patient's allergies indicates:  No Known Allergies    Medications:  Continuous Infusions:  Scheduled Meds:   albuterol-ipratropium  3 mL Nebulization Q6H    amLODIPine  10 mg Oral Daily    enoxaparin  40 mg Subcutaneous Q24H    famotidine  20 mg Oral BID    fluticasone furoate-vilanteroL  1 puff Inhalation Daily    levoFLOXacin  750 mg Intravenous Q24H    methylPREDNISolone sodium succinate  80 mg Intravenous Q12H    nicotine  1 patch Transdermal Daily    polyethylene glycol  17 g Oral Daily    potassium phosphate (monobasic)  500 mg Oral Daily    senna-docusate 8.6-50 mg  1 tablet Oral BID     PRN Meds:acetaminophen, bisacodyL, cloNIDine, metoprolol, sodium chloride 0.9%    Family History     Problem Relation (Age of Onset)    Heart disease Father, Maternal Uncle    Hypertension Father, Son, Maternal Grandmother, Maternal Grandfather    Kidney disease Mother        Tobacco Use    Smoking status: Current Some Day Smoker     Packs/day: 1.00     Years: 55.00     Pack years: 55.00    Smokeless tobacco: Never Used    Tobacco comment: has been weaning down   Substance and Sexual Activity    Alcohol use: Yes     Alcohol/week: 6.0 standard drinks     Types: 6 Cans of beer per week     Comment: 1-2  DRINKS A WEEK    Drug use: No    Sexual activity: Not Currently       Review of Systems   Constitutional: Positive for activity change.   Respiratory: Negative.    Cardiovascular: Negative.    Gastrointestinal: Negative.    Musculoskeletal: Positive for arthralgias.   Neurological: Negative.      Objective:     Vital Signs (Most Recent):  Temp: 97.6 °F (36.4 °C) (09/15/20 1110)  Pulse: 88 (09/15/20 1110)  Resp: 19 (09/15/20 1110)  BP: 121/76 (09/15/20 1110)  SpO2: (!) 92 % (09/15/20 1110) Vital Signs (24h Range):  Temp:  [97.6 °F (36.4 °C)-98 °F (36.7 °C)] 97.6 °F (36.4 °C)  Pulse:  [] 88  Resp:  [16-20] 19  SpO2:  [91 %-98 %] 92 %  BP: (115-141)/(73-82) 121/76      Weight: 54.4 kg (119 lb 14.9 oz)  Body mass index is 14.99 kg/m².    Physical Exam  Vitals signs and nursing note reviewed.   Constitutional:       Appearance: He is cachectic. He is ill-appearing.      Interventions: Nasal cannula in place.   Cardiovascular:      Rate and Rhythm: Normal rate and regular rhythm.      Pulses: Normal pulses.      Heart sounds: Normal heart sounds.   Pulmonary:      Effort: Pulmonary effort is normal.      Breath sounds: Decreased breath sounds present.   Neurological:      Mental Status: He is alert.   Psychiatric:         Behavior: Behavior is cooperative.         Review of Symptoms    Symptom Assessment (ESAS 0-10 Scale)  Pain:  0  Dyspnea:  0  Anxiety:  0  Nausea:  0  Depression:  0  Anorexia:  0  Fatigue:  0  Insomnia:  0  Restlessness:  0  Agitation:  0     CAM / Delirium:  Negative  Constipation:  Negative  Diarrhea:  Negative    Comments:  States he sometimes has pain which he attributes to athritis but takesTylenol and it helps.          Performance Status:  70    Living Arrangements:  Lives with family      Advance Care Planning        Significant Labs: All pertinent labs within the past 24 hours have been reviewed.  CBC:   Recent Labs   Lab 09/15/20  0525   WBC 7.47   HGB 12.5*   HCT 41.2   MCV 89        BMP:  Recent Labs   Lab 09/15/20  0525         K 4.2   CL 90*   CO2 44*   BUN 30*   CREATININE 0.8   CALCIUM 9.0   MG 2.1     LFT:  Lab Results   Component Value Date    AST 28 09/12/2020    ALKPHOS 52 (L) 09/12/2020    BILITOT 0.5 09/12/2020     Albumin:   Albumin   Date Value Ref Range Status   09/12/2020 3.3 (L) 3.5 - 5.2 g/dL Final     Protein:   Total Protein   Date Value Ref Range Status   09/12/2020 7.4 6.0 - 8.4 g/dL Final     Lactic acid:   Lab Results   Component Value Date    LACTATE 0.7 09/12/2020    LACTATE 1.1 02/11/2016       Significant Imaging: I have reviewed all pertinent imaging results/findings within the past 24 hours.        Advance Care Planning        Previous AD on file but were invalid due to signed by his sister and then witnessed by family    Power of   Patient remembered having these documents but explained they were not completed correctly. The patient has previously appointed a HCPOA but documents were invalid because patient had not signed himself and the witnesses were family. After our discussion, the patient has decided to complete a new HCPOA to make it valid and has appointed his sister Elisabeth Camacho (cell) [phone] and his niece Gisell Camacho (H) [phone] same as before and both of whom he lives with. Document completed and placed in his blue chart to be scanned in [employer]    Living Will  During this visit, I engaged the patient  in the advance care planning process.  The patient and I reviewed the role for advance directives and their purpose in directing future healthcare if the patient's unable to speak for him/herself.  At this point in time, the patient does have full decision-making capacity.  We discussed different extreme health states that he could experience, and reviewed what kind of medical care he would want in those situations.  The patient communicated that if he were comatose and had little chance of a meaningful recovery, he would not want machines/life-sustaining treatments used. The patient has completed a living will to reflect these preferences.     Will notify sister Elisabeth Camacho to let her know we completed new documents and why and that the people he has chosen are the same as on the invalid documents      I spent a total of 23 minutes engaging the patient in this advance care planning discussion.                   Christy Cotto, NP  Palliative Medicine  [facility]

## 2020-09-15 NOTE — PLAN OF CARE
Problem: Occupational Therapy Goal  Goal: Occupational Therapy Goal  Description: Goals to be met by: 09/28/20     Patient will increase functional independence with ADLs by performing:    UE Dressing with Set-up Assistance.  LE Dressing with Supervision.  Grooming while standing at sink with Supervision.  Toileting from bedside commode with Supervision for hygiene and clothing management.   Supine to sit with Supervision.  Step transfer with Supervision  Toilet transfer to bedside commode with Supervision.  Upper extremity exercise program x12 reps per handout, with supervision.  Bed <> wheelchair stand pivot transfer with Supervision.     Outcome: Ongoing, Progressing     SBA for bed mobility and a few steps with RW bed>chair. OT rec home with HHOT and family assist/supervision with wheelchair, BSC, TTB, and pulse oximeter.

## 2020-09-15 NOTE — ASSESSMENT & PLAN NOTE
Chronically on O2 at 3L via NC  Acute decompensation due to COPD exacerbation  Patient was taken off BIPAP this morning temporarily, but had to be placed back on BIPAP on 9/13  Continued IV steroids, NEBS and BIPAP support  Weaned off BIPAP overnight and currently doing well on 4L via NC  Will begin weaning down steroids today   Stable for step down today  Begin mobilization with PT/OT and  for discharge planning  Discharge pending clinical progress and rehab recommendations,PT,OT in progress.

## 2020-09-15 NOTE — PT/OT/SLP PROGRESS
Physical Therapy Treatment    Patient Name:  Enzo Wilson   MRN:  [id number]    Recommendations:     Discharge Recommendations:  home health PT(with family supervision/assistance)   Discharge Equipment Recommendations: wheelchair, bedside commode, bath bench   Barriers to discharge: Inaccessible home    Assessment:     Enzo Wilson is a 80 y.o. male admitted with a medical diagnosis of Acute on chronic respiratory failure with hypoxia and hypercapnia.  He presents with the following impairments/functional limitations:  weakness, impaired endurance, impaired functional mobilty, gait instability, impaired balance, decreased lower extremity function, decreased safety awareness, impaired cardiopulmonary response to activity.    Rehab Prognosis: Fair; patient would benefit from acute skilled PT services to address these deficits and reach maximum level of function.    Recent Surgery: * No surgery found *      Plan:     During this hospitalization, patient to be seen 5 x/week to address the identified rehab impairments via gait training, therapeutic activities, therapeutic exercises, neuromuscular re-education and progress toward the following goals:    · Plan of Care Expires:  09/28/20    Subjective     Chief Complaint: N/A  Patient/Family Comments/goals: Pt reported that he's close to baseline.    Pain/Comfort:  Pain Rating 1: 0/10      Objective:     Patient found HOB elevated with bed alarm, oxygen 3L, peripheral IV, telemetry upon PT entry to room.     General Precautions: Standard, respiratory, fall   Orthopedic Precautions:N/A   Braces: N/A    Functional Mobility:  Pt reported no SOB during activities, tolerated seated activities well on 3L O2 NC.  However, pt required 4L O2 NC during standing activities and ambulation.  Over time, pt required more rest time to recover.    · Bed Mobility:     · Scooting: stand by assistance  · Supine to Sit: stand by assistance with HOB elevated  · Sit to Supine: stand by  assistance with HOB elevated  · Transfers:     · Sit to Stand:  stand by assistance and contact guard assistance with no AD x3 trials   · Gait: Pt ambulated ~60 ft and ~200 ft with CGA using 4L O2 NC and no AD.  Pt with narrow SAMIA, mild unsteadiness, decreased step length, and decreased nichole.  Pt with 1 LOB, required min A to recover.  spO2 on 4L ~85-92% and HR ~100 bpm.    · Balance: Pt with fair dynamic standing balance.       AM-PAC 6 CLICK MOBILITY  Turning over in bed (including adjusting bedclothes, sheets and blankets)?: 4  Sitting down on and standing up from a chair with arms (e.g., wheelchair, bedside commode, etc.): 4  Moving from lying on back to sitting on the side of the bed?: 4  Moving to and from a bed to a chair (including a wheelchair)?: 3  Need to walk in hospital room?: 3  Climbing 3-5 steps with a railing?: 3  Basic Mobility Total Score: 21       Therapeutic Activities and Exercises:  BLE seated therex x20 reps: AP, LAQ, and hip flex; spO2 on 3L ~92% and HR ~100 bpm    BLE standing therex x10 reps with HHA: marches and calf raises; spO2 on 4L ~85-92% and HR ~100 bpm    Patient left HOB elevated with all lines intact, call button in reach and bed alarm on.  Pt placed back on 3L O2 NC.     GOALS:   Multidisciplinary Problems     Physical Therapy Goals        Problem: Physical Therapy Goal    Goal Priority Disciplines Outcome Goal Variances Interventions   Physical Therapy Goal     PT, PT/OT Ongoing, Progressing     Description: Goals to be met by:     Patient will increase functional independence with mobility by performin. Supine to sit with Modified [place]  2. Sit to supine with Modified [place]  3. Sit to stand transfer with Modified [place] with or without RW.  4. Bed to chair transfer with Modified [place] using with or without Rolling Walker and oxygen.  5. Gait  x 250 feet with Modified [place] using with or without Rolling Walker and oxygen.  6.  Ascend/descend 1 flight of stairs with bilateral Handrails Modified [place] and oxygen.  7. Lower extremity exercise program x20 reps per handout, with independence                     Time Tracking:     PT Received On: 09/15/20  PT Start Time: 1610     PT Stop Time: 1635  PT Total Time (min): 25 min     Billable Minutes: Gait Training 15 min and Therapeutic Exercise 10 min             PTA Visit Number: 0     Radha U Roxy, PT  09/15/2020

## 2020-09-15 NOTE — SUBJECTIVE & OBJECTIVE
Interval History: No acute events overnight, reported breathing much easier. He has been stable on 4L NC. Tired and feels weak.    Review of Systems   Constitutional: Negative for chills and fever.   Respiratory: Positive for shortness of breath.    Cardiovascular: Negative for chest pain.     Objective:     Vital Signs (Most Recent):  Temp: 98 °F (36.7 °C) (09/15/20 0708)  Pulse: 81 (09/15/20 0829)  Resp: 18 (09/15/20 0829)  BP: 115/77 (09/15/20 0708)  SpO2: (!) 92 % (09/15/20 0708) Vital Signs (24h Range):  Temp:  [97 °F (36.1 °C)-98 °F (36.7 °C)] 98 °F (36.7 °C)  Pulse:  [] 81  Resp:  [16-20] 18  SpO2:  [91 %-98 %] 92 %  BP: (115-141)/(73-85) 115/77     Weight: 54.4 kg (119 lb 14.9 oz)  Body mass index is 14.99 kg/m².    Intake/Output Summary (Last 24 hours) at 9/15/2020 1040  Last data filed at 9/15/2020 0822  Gross per 24 hour   Intake 600 ml   Output 1200 ml   Net -600 ml      Physical Exam  Constitutional:       Appearance: He is underweight. He is ill-appearing.      Comments: Cachetic and chronically ill appearing   HENT:      Head: Normocephalic and atraumatic.      Nose: Nose normal.      Mouth/Throat:      Comments: Poor dentition  Eyes:      Extraocular Movements: Extraocular movements intact.      Pupils: Pupils are equal, round, and reactive to light.   Cardiovascular:      Rate and Rhythm: Normal rate and regular rhythm.   Pulmonary:      Effort: Pulmonary effort is normal.      Breath sounds: Wheezing present.      Comments: Distant breath sound, with mild expiratory wheezing, but able to be off BIPAP on NC this am  Abdominal:      General: Abdomen is flat. There is no distension.      Palpations: Abdomen is soft.      Tenderness: There is no abdominal tenderness. There is no guarding.   Musculoskeletal: Normal range of motion.   Skin:     General: Skin is warm and dry.      Capillary Refill: Capillary refill takes 2 to 3 seconds.   Neurological:      General: No focal deficit present.       Mental Status: He is oriented to person, place, and time.   Psychiatric:         Mood and Affect: Mood normal.         Thought Content: Thought content normal.         Significant Labs: All pertinent labs within the past 24 hours have been reviewed.    Significant Imaging: I have reviewed and interpreted all pertinent imaging results/findings within the past 24 hours.

## 2020-09-15 NOTE — PT/OT/SLP PROGRESS
Occupational Therapy   Treatment    Name: Enzo Wilson  MRN: [id number]  Admitting Diagnosis:  Acute on chronic respiratory failure with hypoxia and hypercapnia       Recommendations:     Discharge Recommendations: home health OT(with 24 hour assist/supervision)  Discharge Equipment Recommendations:  wheelchair, bedside commode, bath bench(pulse oximeter, long-handled sponge)  Barriers to discharge:  (spO2 desaturates with minimal EOB/OOB activity)    Assessment:     Enzo Wilson is a 80 y.o. male with a medical diagnosis of Acute on chronic respiratory failure with hypoxia and hypercapnia. Performance deficits affecting function are weakness, impaired cardiopulmonary response to activity, decreased lower extremity function, impaired endurance, impaired self care skills, impaired balance, decreased safety awareness, gait instability, impaired functional mobilty.     SBA for bed mobility and a few steps with RW bed>chair.     Pt clarified more PLOF today. Pt lives with his sister (who has a history of CVA and walks with a RW) and his 62 yo niece. The niece (in good health) does not work and helps with all home maintenance tasks. Pt has 6 adult children, 4 local.     Nurse gave the OK for O2 3-5L during session:   · HOB elevated at rest: 80% on 3L   · Bed in chair positioned repositioned: 85% on 3L; increased to 4L: 87%  · Seated EOB at rest: 87% on 4L; increased to 4.5L: 90%   · After few steps bed>chair: 84% on 4.5L and unable to recover; increased to 5L: 92% with cueing for pursed lip breathing and increased time. NurseColin, present once in the chair- decreased to 4L: 91%    Rehab Prognosis:  Fair; patient would benefit from acute skilled OT services to address these deficits and reach maximum level of function.       Plan:     Patient to be seen (5-6x/week) to address the above listed problems via self-care/home management, therapeutic activities, therapeutic exercises  · Plan of Care Expires:  09/28/20  · Plan of Care Reviewed with: patient    Subjective     Chief complaint: none reported  Patient's comments/goals: happy to sit up in the chair     Pain/Comfort:  · Pain Rating 1: 0/10    Objective:     Communicated with: nurseColin, prior to session.  Patient found HOB elevated with peripheral IV, oxygen upon OT entry to room.    General Precautions: Standard, fall, respiratory   Orthopedic Precautions:N/A   Braces: N/A     Occupational Performance:     Bed Mobility:    · Patient completed Scooting/Bridging with stand by assistance  · Patient completed Supine to Sit with stand by assistance with bed in chair position    Functional Mobility/Transfers:  · Patient completed Sit <> Stand Transfer with stand by assistance  with  rolling walker   · Patient completed Bed <> Chair Transfer using Step Transfer technique with stand by assistance with rolling walker  · Functional Mobility: Session limited to only a few steps bed>chair d/t spO2 levels as described above.     Activities of Daily Living:  · Upper Body Dressing: set-up with donning back gown while seated EOB        [facility] 6 Click ADL: 22    Treatment & Education:  · Pt re-educated on OT role/POC.   · Importance of OOB activity with staff assistance.  · Pt reports that he ambulated with nursing staff to the bathroom; OT encouraged pt and PCT to go to the BSC while monitoring spO2  · Safety during functional t/f and mobility   · OT demo use of RW for functional mobility d/t LOB yesterday; pt demo'd back well   · Prolonged time with transitioning and seated implementing pursed lip breathing while seated in upright posture   · Encouraged pt to continue ankle pumps regularly and BUE AROM elbow flex/ext as described yesterday   · White board updated   · Multiple self-care tasks/functional mobility completed- assistance level noted above   · All questions/concerns answered within OT scope of practice       Patient left up in chair with all lines intact, call  button in reach, chair alarm on, PCT, Yael, notified and nurse, Colin, presentEducation:      GOALS:   Multidisciplinary Problems     Occupational Therapy Goals        Problem: Occupational Therapy Goal    Goal Priority Disciplines Outcome Interventions   Occupational Therapy Goal     OT, PT/OT Ongoing, Progressing    Description: Goals to be met by: 09/28/20     Patient will increase functional independence with ADLs by performing:    UE Dressing with Set-up Assistance.  LE Dressing with Supervision.  Grooming while standing at sink with Supervision.  Toileting from bedside commode with Supervision for hygiene and clothing management.   Supine to sit with Supervision.  Step transfer with Supervision  Toilet transfer to bedside commode with Supervision.  Upper extremity exercise program x12 reps per handout, with supervision.  Bed <> wheelchair stand pivot transfer with Supervision.                      Time Tracking:     OT Date of Treatment: 09/15/20  OT Start Time: 1129  OT Stop Time: 1155  OT Total Time (min): 26 min    Billable Minutes:Therapeutic Activity 26 min    Stefanie Portillo OT  9/15/2020

## 2020-09-15 NOTE — SUBJECTIVE & OBJECTIVE
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    Vocal cord polyps        Past Surgical History:   Procedure Laterality Date    EXCISION OF MASS OF BACK N/A 6/10/2019    Procedure: EXCISION, MASS, BACK;  Surgeon: Canelo Lockwood MD;  Location: [facility];  Service: General;  Laterality: N/A;    TONSILLECTOMY         Review of patient's allergies indicates:  No Known Allergies    Medications:  Continuous Infusions:  Scheduled Meds:   albuterol-ipratropium  3 mL Nebulization Q6H    amLODIPine  10 mg Oral Daily    enoxaparin  40 mg Subcutaneous Q24H    famotidine  20 mg Oral BID    fluticasone furoate-vilanteroL  1 puff Inhalation Daily    levoFLOXacin  750 mg Intravenous Q24H    methylPREDNISolone sodium succinate  80 mg Intravenous Q12H    nicotine  1 patch Transdermal Daily    polyethylene glycol  17 g Oral Daily    potassium phosphate (monobasic)  500 mg Oral Daily    senna-docusate 8.6-50 mg  1 tablet Oral BID     PRN Meds:acetaminophen, bisacodyL, cloNIDine, metoprolol, sodium chloride 0.9%    Family History     Problem Relation (Age of Onset)    Heart disease Father, Maternal Uncle    Hypertension Father, Son, Maternal Grandmother, Maternal Grandfather    Kidney disease Mother        Tobacco Use    Smoking status: Current Some Day Smoker     Packs/day: 1.00     Years: 55.00     Pack years: 55.00    Smokeless tobacco: Never Used    Tobacco comment: has been weaning down   Substance and Sexual Activity    Alcohol use: Yes     Alcohol/week: 6.0 standard drinks     Types: 6 Cans of beer per week     Comment: 1-2  DRINKS A WEEK    Drug use: No    Sexual activity: Not Currently       Review of Systems    Constitutional: Positive for activity change.   Respiratory: Negative.    Cardiovascular: Negative.    Gastrointestinal: Negative.    Musculoskeletal: Positive for arthralgias.   Neurological: Negative.      Objective:     Vital Signs (Most Recent):  Temp: 97.6 °F (36.4 °C) (09/15/20 1110)  Pulse: 88 (09/15/20 1110)  Resp: 19 (09/15/20 1110)  BP: 121/76 (09/15/20 1110)  SpO2: (!) 92 % (09/15/20 1110) Vital Signs (24h Range):  Temp:  [97.6 °F (36.4 °C)-98 °F (36.7 °C)] 97.6 °F (36.4 °C)  Pulse:  [] 88  Resp:  [16-20] 19  SpO2:  [91 %-98 %] 92 %  BP: (115-141)/(73-82) 121/76     Weight: 54.4 kg (119 lb 14.9 oz)  Body mass index is 14.99 kg/m².    Physical Exam  Vitals signs and nursing note reviewed.   Constitutional:       Appearance: He is cachectic. He is ill-appearing.      Interventions: Nasal cannula in place.   Cardiovascular:      Rate and Rhythm: Normal rate and regular rhythm.      Pulses: Normal pulses.      Heart sounds: Normal heart sounds.   Pulmonary:      Effort: Pulmonary effort is normal.      Breath sounds: Decreased breath sounds present.   Neurological:      Mental Status: He is alert.   Psychiatric:         Behavior: Behavior is cooperative.         Review of Symptoms    Symptom Assessment (ESAS 0-10 Scale)  Pain:  0  Dyspnea:  0  Anxiety:  0  Nausea:  0  Depression:  0  Anorexia:  0  Fatigue:  0  Insomnia:  0  Restlessness:  0  Agitation:  0     CAM / Delirium:  Negative  Constipation:  Negative  Diarrhea:  Negative    Comments:  States he sometimes has pain which he attributes to athritis but takesTylenol and it helps.          Performance Status:  70    Living Arrangements:  Lives with family      Advance Care Planning        Significant Labs: All pertinent labs within the past 24 hours have been reviewed.  CBC:   Recent Labs   Lab 09/15/20  0525   WBC 7.47   HGB 12.5*   HCT 41.2   MCV 89        BMP:  Recent Labs   Lab 09/15/20  0525         K 4.2   CL 90*   CO2 44*    BUN 30*   CREATININE 0.8   CALCIUM 9.0   MG 2.1     LFT:  Lab Results   Component Value Date    AST 28 09/12/2020    ALKPHOS 52 (L) 09/12/2020    BILITOT 0.5 09/12/2020     Albumin:   Albumin   Date Value Ref Range Status   09/12/2020 3.3 (L) 3.5 - 5.2 g/dL Final     Protein:   Total Protein   Date Value Ref Range Status   09/12/2020 7.4 6.0 - 8.4 g/dL Final     Lactic acid:   Lab Results   Component Value Date    LACTATE 0.7 09/12/2020    LACTATE 1.1 02/11/2016       Significant Imaging: I have reviewed all pertinent imaging results/findings within the past 24 hours.       Advance Care Planning        Previous AD on file but were invalid due to signed by his sister and then witnessed by family    Power of   Patient remembered having these documents but explained they were not completed correctly. The patient has previously appointed a HCPOA but documents were invalid because patient had not signed himself and the witnesses were family. After our discussion, the patient has decided to complete a new HCPOA to make it valid and has appointed his sister Elisbaeth Camacho (cell) [phone] and his niece Gisell Camacho (H) [phone] same as before and both of whom he lives with. Document completed and placed in his blue chart to be scanned in [facility]    Living Will  During this visit, I engaged the patient  in the advance care planning process.  The patient and I reviewed the role for advance directives and their purpose in directing future healthcare if the patient's unable to speak for him/herself.  At this point in time, the patient does have full decision-making capacity.  We discussed different extreme health states that he could experience, and reviewed what kind of medical care he would want in those situations.  The patient communicated that if he were comatose and had little chance of a meaningful recovery, he would not want machines/life-sustaining treatments used. The patient has completed a living  will to reflect these preferences.     Will notify sister Elisabeth Camacho to let her know we completed new documents and why and that the people he has chosen are the same as on the invalid documents      I spent a total of 23 minutes engaging the patient in this advance care planning discussion.

## 2020-09-15 NOTE — HPI
Principal Problem:Acute on chronic respiratory failure with hypoxia and hypercapnia           HPI:  Enzo Wilson is a 80 y.o. male that (in part)  has a past medical history of CHF (congestive heart failure), COPD (chronic obstructive pulmonary disease), Hypertension, Respiratory distress, Squamous cell cancer of epiglottis, and Vocal cord polyps.  has a past surgical history that includes Tonsillectomy and Excision of mass of back (N/A, 6/10/2019). Presents to [facility] Emergency Department complaining of cough associated with shortness of breath.  Subacute onset 2 days ago with progressive worsening.  Worsened with ambulation and minimal exertion.  Minimally relieved with rest.  On home oxygen with nasal cannula at 3 L. was given DuoNebs and supplemental oxygen by EMS with initial improvement in oxygenation followed by a subsequent decline again once he was in the emergency department.  Denies recent travel or sick contacts.  No COVID-19 exposure.  Smokes cigarettes.  History of COPD.  Reports compliance with home medication regimen.  Denies hemoptysis.  Positive for wheezing.  Denies stridor or night sweats.  Has dyspnea with exertion.  Moderate to severe intensity.  Daily frequency.  Constant duration.     In the emergency department routine laboratory studies, chest x-ray, EKG, cardiac enzymes were obtained.  Unable to maintain oxygenation with nasal cannula.  Non-rebreather given for short time.  ABG performed which was concerning for pH of 7.2 and a pCO2 of 99.1, and PO2 of 62.  Was given empiric antibiotics, DuoNeb treatment, steroids, and started on BiPAP.  Being admitted for acute on chronic hypoxemic and hypercapnic respiratory failure secondary to COPD exacerbation.     Hospital medicine has been asked to admit to inpatient for further evaluation and treatment.      Overview/Hospital Course:  Mr. Wilson presented with worsening SOB. Admitted to ICU for acute on chronic  respiratory failure secondary to COPD exacerbation with need for BIPAP support. Treatment initiated with IV steroids, NEBS, O2, and empiric Levaquin. Pulmonary consulted.     Interval History: No acute events overnight, reported breathing much easier. He has been stable on 4L NC. Tired and feels weak.

## 2020-09-15 NOTE — PROGRESS NOTES
[facility] Hospital Medicine  Progress Note    Patient Name: Enzo Wilson  MRN: [id number]  Patient Class: IP- Inpatient   Admission Date: 9/12/2020  Length of Stay: 3 days  Attending Physician: eCm Raymond MD  Primary Care Provider: Lewis Self MD        Subjective:     Principal Problem:Acute on chronic respiratory failure with hypoxia and hypercapnia        HPI:  Enzo Wilson is a 80 y.o. male that (in part)  has a past medical history of CHF (congestive heart failure), COPD (chronic obstructive pulmonary disease), Hypertension, Respiratory distress, Squamous cell cancer of epiglottis, and Vocal cord polyps.  has a past surgical history that includes Tonsillectomy and Excision of mass of back (N/A, 6/10/2019). Presents to [facility] Emergency Department complaining of cough associated with shortness of breath.  Subacute onset 2 days ago with progressive worsening.  Worsened with ambulation and minimal exertion.  Minimally relieved with rest.  On home oxygen with nasal cannula at 3 L. was given DuoNebs and supplemental oxygen by EMS with initial improvement in oxygenation followed by a subsequent decline again once he was in the emergency department.  Denies recent travel or sick contacts.  No COVID-19 exposure.  Smokes cigarettes.  History of COPD.  Reports compliance with home medication regimen.  Denies hemoptysis.  Positive for wheezing.  Denies stridor or night sweats.  Has dyspnea with exertion.  Moderate to severe intensity.  Daily frequency.  Constant duration.    In the emergency department routine laboratory studies, chest x-ray, EKG, cardiac enzymes were obtained.  Unable to maintain oxygenation with nasal cannula.  Non-rebreather given for short time.  ABG performed which was concerning for pH of 7.2 and a pCO2 of 99.1, and PO2 of 62.  Was given empiric antibiotics, DuoNeb treatment, steroids, and started on BiPAP.  Being admitted for  acute on chronic hypoxemic and hypercapnic respiratory failure secondary to COPD exacerbation.    Hospital medicine has been asked to admit to inpatient for further evaluation and treatment.     Overview/Hospital Course:  Mr. Wilson presented with worsening SOB. Admitted to ICU for acute on chronic respiratory failure secondary to COPD exacerbation with need for BIPAP support. Treatment initiated with IV steroids, NEBS, O2, and empiric Levaquin. Pulmonary consulted.  PT,OT in progress.    Interval History: No acute events overnight, reported breathing much easier. He has been stable on 4L NC. Tired and feels weak.    Review of Systems   Constitutional: Negative for chills and fever.   Respiratory: Positive for shortness of breath.    Cardiovascular: Negative for chest pain.     Objective:     Vital Signs (Most Recent):  Temp: 98 °F (36.7 °C) (09/15/20 0708)  Pulse: 81 (09/15/20 0829)  Resp: 18 (09/15/20 0829)  BP: 115/77 (09/15/20 0708)  SpO2: (!) 92 % (09/15/20 0708) Vital Signs (24h Range):  Temp:  [97 °F (36.1 °C)-98 °F (36.7 °C)] 98 °F (36.7 °C)  Pulse:  [] 81  Resp:  [16-20] 18  SpO2:  [91 %-98 %] 92 %  BP: (115-141)/(73-85) 115/77     Weight: 54.4 kg (119 lb 14.9 oz)  Body mass index is 14.99 kg/m².    Intake/Output Summary (Last 24 hours) at 9/15/2020 1040  Last data filed at 9/15/2020 0822  Gross per 24 hour   Intake 600 ml   Output 1200 ml   Net -600 ml      Physical Exam  Constitutional:       Appearance: He is underweight. He is ill-appearing.      Comments: Cachetic and chronically ill appearing   HENT:      Head: Normocephalic and atraumatic.      Nose: Nose normal.      Mouth/Throat:      Comments: Poor dentition  Eyes:      Extraocular Movements: Extraocular movements intact.      Pupils: Pupils are equal, round, and reactive to light.   Cardiovascular:      Rate and Rhythm: Normal rate and regular rhythm.   Pulmonary:      Effort: Pulmonary effort is normal.      Breath sounds: Wheezing  present.      Comments: Distant breath sound, with mild expiratory wheezing, but able to be off BIPAP on NC this am  Abdominal:      General: Abdomen is flat. There is no distension.      Palpations: Abdomen is soft.      Tenderness: There is no abdominal tenderness. There is no guarding.   Musculoskeletal: Normal range of motion.   Skin:     General: Skin is warm and dry.      Capillary Refill: Capillary refill takes 2 to 3 seconds.   Neurological:      General: No focal deficit present.      Mental Status: He is oriented to person, place, and time.   Psychiatric:         Mood and Affect: Mood normal.         Thought Content: Thought content normal.         Significant Labs: All pertinent labs within the past 24 hours have been reviewed.    Significant Imaging: I have reviewed and interpreted all pertinent imaging results/findings within the past 24 hours.      Assessment/Plan:      * Acute on chronic respiratory failure with hypoxia and hypercapnia  Chronically on O2 at 3L via NC  Acute decompensation due to COPD exacerbation  Patient was taken off BIPAP this morning temporarily, but had to be placed back on BIPAP on 9/13  Continued IV steroids, NEBS and BIPAP support  Weaned off BIPAP overnight and currently doing well on 4L via NC  Will begin weaning down steroids today   Stable for step down today  Begin mobilization with PT/OT and  for discharge planning  Discharge pending clinical progress and rehab recommendations,PT,OT in progress.    Goals of care, counseling/discussion  Advance Care Planning   Will consult palliative care for goals of care discussions      COPD exacerbation  As discussed above    Chronic respiratory failure  As discussed above    Tobacco abuse  Smoking cessation counseling after acute issues addressed    Pulmonary nodule  Noted on previous exam and is chronic  Will need follow up     Mild malnutrition  Supplement with Boost    Pulmonary artery hypertension  Noted      Essential hypertension  Continue amlodipine    COPD (chronic obstructive pulmonary disease)  As discussed above      VTE Risk Mitigation (From admission, onward)         Ordered     enoxaparin injection 40 mg  Every 24 hours      09/13/20 0401     IP VTE HIGH RISK PATIENT  Once      09/13/20 0401     Place sequential compression device  Until discontinued      09/13/20 0401                Discharge Planning   SID:      Code Status: Full Code   Is the patient medically ready for discharge?:     Reason for patient still in hospital (select all that apply): Patient trending condition  Discharge Plan A: Home with family                  Cem Raymond MD  Department of Hospital Medicine   [facility]

## 2020-09-16 VITALS
DIASTOLIC BLOOD PRESSURE: 72 MMHG | SYSTOLIC BLOOD PRESSURE: 127 MMHG | TEMPERATURE: 98 F | HEART RATE: 92 BPM | WEIGHT: 119.94 LBS | OXYGEN SATURATION: 96 % | RESPIRATION RATE: 19 BRPM | HEIGHT: 75 IN | BODY MASS INDEX: 14.91 KG/M2

## 2020-09-16 LAB
ANION GAP SERPL CALC-SCNC: 3 MMOL/L (ref 8–16)
BASOPHILS # BLD AUTO: 0 K/UL (ref 0–0.2)
BASOPHILS NFR BLD: 0 % (ref 0–1.9)
BUN SERPL-MCNC: 34 MG/DL (ref 8–23)
CALCIUM SERPL-MCNC: 9.1 MG/DL (ref 8.7–10.5)
CHLORIDE SERPL-SCNC: 90 MMOL/L (ref 95–110)
CO2 SERPL-SCNC: 44 MMOL/L (ref 23–29)
CREAT SERPL-MCNC: 0.9 MG/DL (ref 0.5–1.4)
DIFFERENTIAL METHOD: ABNORMAL
EOSINOPHIL # BLD AUTO: 0 K/UL (ref 0–0.5)
EOSINOPHIL NFR BLD: 0 % (ref 0–8)
ERYTHROCYTE [DISTWIDTH] IN BLOOD BY AUTOMATED COUNT: 17.1 % (ref 11.5–14.5)
EST. GFR  (AFRICAN AMERICAN): >60 ML/MIN/1.73 M^2
EST. GFR  (NON AFRICAN AMERICAN): >60 ML/MIN/1.73 M^2
GLUCOSE SERPL-MCNC: 93 MG/DL (ref 70–110)
HCT VFR BLD AUTO: 41.1 % (ref 40–54)
HGB BLD-MCNC: 12.6 G/DL (ref 14–18)
IMM GRANULOCYTES # BLD AUTO: 0.06 K/UL (ref 0–0.04)
IMM GRANULOCYTES NFR BLD AUTO: 0.8 % (ref 0–0.5)
LYMPHOCYTES # BLD AUTO: 0.3 K/UL (ref 1–4.8)
LYMPHOCYTES NFR BLD: 4.2 % (ref 18–48)
MAGNESIUM SERPL-MCNC: 2.3 MG/DL (ref 1.6–2.6)
MCH RBC QN AUTO: 28.3 PG (ref 27–31)
MCHC RBC AUTO-ENTMCNC: 30.7 G/DL (ref 32–36)
MCV RBC AUTO: 92 FL (ref 82–98)
MONOCYTES # BLD AUTO: 0.4 K/UL (ref 0.3–1)
MONOCYTES NFR BLD: 5.9 % (ref 4–15)
NEUTROPHILS # BLD AUTO: 6.6 K/UL (ref 1.8–7.7)
NEUTROPHILS NFR BLD: 89.1 % (ref 38–73)
NRBC BLD-RTO: 0 /100 WBC
PHOSPHATE SERPL-MCNC: 2.4 MG/DL (ref 2.7–4.5)
PLATELET # BLD AUTO: 150 K/UL (ref 150–350)
PMV BLD AUTO: 9.5 FL (ref 9.2–12.9)
POTASSIUM SERPL-SCNC: 4.5 MMOL/L (ref 3.5–5.1)
RBC # BLD AUTO: 4.46 M/UL (ref 4.6–6.2)
SODIUM SERPL-SCNC: 137 MMOL/L (ref 136–145)
WBC # BLD AUTO: 7.41 K/UL (ref 3.9–12.7)

## 2020-09-16 PROCEDURE — 84100 ASSAY OF PHOSPHORUS: CPT | Mod: HCNC

## 2020-09-16 PROCEDURE — 94640 AIRWAY INHALATION TREATMENT: CPT | Mod: HCNC

## 2020-09-16 PROCEDURE — 25000003 PHARM REV CODE 250: Mod: HCNC | Performed by: HOSPITALIST

## 2020-09-16 PROCEDURE — 85025 COMPLETE CBC W/AUTO DIFF WBC: CPT | Mod: HCNC

## 2020-09-16 PROCEDURE — 99900035 HC TECH TIME PER 15 MIN (STAT): Mod: HCNC

## 2020-09-16 PROCEDURE — 36415 COLL VENOUS BLD VENIPUNCTURE: CPT | Mod: HCNC

## 2020-09-16 PROCEDURE — 94660 CPAP INITIATION&MGMT: CPT | Mod: HCNC

## 2020-09-16 PROCEDURE — 63600175 PHARM REV CODE 636 W HCPCS: Mod: HCNC | Performed by: HOSPITALIST

## 2020-09-16 PROCEDURE — 97110 THERAPEUTIC EXERCISES: CPT | Mod: HCNC,CQ

## 2020-09-16 PROCEDURE — 25000242 PHARM REV CODE 250 ALT 637 W/ HCPCS: Mod: HCNC | Performed by: HOSPITALIST

## 2020-09-16 PROCEDURE — 97530 THERAPEUTIC ACTIVITIES: CPT | Mod: HCNC,CO

## 2020-09-16 PROCEDURE — 27000221 HC OXYGEN, UP TO 24 HOURS: Mod: HCNC

## 2020-09-16 PROCEDURE — 97110 THERAPEUTIC EXERCISES: CPT | Mod: HCNC,CO

## 2020-09-16 PROCEDURE — S4991 NICOTINE PATCH NONLEGEND: HCPCS | Mod: HCNC | Performed by: HOSPITALIST

## 2020-09-16 PROCEDURE — 94761 N-INVAS EAR/PLS OXIMETRY MLT: CPT | Mod: HCNC

## 2020-09-16 PROCEDURE — 83735 ASSAY OF MAGNESIUM: CPT | Mod: HCNC

## 2020-09-16 PROCEDURE — 80048 BASIC METABOLIC PNL TOTAL CA: CPT | Mod: HCNC

## 2020-09-16 RX ORDER — IPRATROPIUM BROMIDE AND ALBUTEROL SULFATE 2.5; .5 MG/3ML; MG/3ML
3 SOLUTION RESPIRATORY (INHALATION)
Status: DISCONTINUED | OUTPATIENT
Start: 2020-09-16 | End: 2020-09-16

## 2020-09-16 RX ORDER — PREDNISONE 20 MG/1
40 TABLET ORAL DAILY
Status: DISCONTINUED | OUTPATIENT
Start: 2020-09-16 | End: 2020-09-16 | Stop reason: HOSPADM

## 2020-09-16 RX ADMIN — METHYLPREDNISOLONE SODIUM SUCCINATE 80 MG: 125 INJECTION, POWDER, FOR SOLUTION INTRAMUSCULAR; INTRAVENOUS at 08:09

## 2020-09-16 RX ADMIN — DOCUSATE SODIUM 50 MG AND SENNOSIDES 8.6 MG 1 TABLET: 8.6; 5 TABLET, FILM COATED ORAL at 08:09

## 2020-09-16 RX ADMIN — FAMOTIDINE 20 MG: 20 TABLET ORAL at 08:09

## 2020-09-16 RX ADMIN — NICOTINE 1 PATCH: 21 PATCH, EXTENDED RELEASE TRANSDERMAL at 08:09

## 2020-09-16 RX ADMIN — POTASSIUM PHOSPHATE, MONOBASIC 500 MG: 500 TABLET, SOLUBLE ORAL at 10:09

## 2020-09-16 RX ADMIN — IPRATROPIUM BROMIDE AND ALBUTEROL SULFATE 3 ML: .5; 3 SOLUTION RESPIRATORY (INHALATION) at 01:09

## 2020-09-16 RX ADMIN — ENOXAPARIN SODIUM 40 MG: 40 INJECTION SUBCUTANEOUS at 05:09

## 2020-09-16 RX ADMIN — LEVOFLOXACIN 750 MG: 750 INJECTION, SOLUTION INTRAVENOUS at 04:09

## 2020-09-16 RX ADMIN — IPRATROPIUM BROMIDE AND ALBUTEROL SULFATE 3 ML: .5; 3 SOLUTION RESPIRATORY (INHALATION) at 08:09

## 2020-09-16 RX ADMIN — PREDNISONE 40 MG: 20 TABLET ORAL at 10:09

## 2020-09-16 RX ADMIN — POTASSIUM PHOSPHATE, MONOBASIC 500 MG: 500 TABLET, SOLUBLE ORAL at 08:09

## 2020-09-16 RX ADMIN — FLUTICASONE FUROATE AND VILANTEROL TRIFENATATE 1 PUFF: 100; 25 POWDER RESPIRATORY (INHALATION) at 09:09

## 2020-09-16 RX ADMIN — AMLODIPINE BESYLATE 10 MG: 5 TABLET ORAL at 08:09

## 2020-09-16 RX ADMIN — POLYETHYLENE GLYCOL 3350 17 G: 17 POWDER, FOR SOLUTION ORAL at 08:09

## 2020-09-16 NOTE — PT/OT/SLP PROGRESS
Occupational Therapy   Treatment    Name: Enzo Wilson  MRN: [id number]  Admitting Diagnosis:  Acute on chronic respiratory failure with hypoxia and hypercapnia       Recommendations:     Discharge Recommendations: home health OT(with 24 hour assist/supervision)  Discharge Equipment Recommendations:  wheelchair, bedside commode, bath bench(pulse oximeter, long-handled sponge)  Barriers to discharge:       Assessment:     Enzo Wilson is a 80 y.o. male with a medical diagnosis of Acute on chronic respiratory failure with hypoxia and hypercapnia.  He presents with the following performance deficits affecting function: weakness, impaired endurance, impaired self care skills, impaired functional mobilty, gait instability, impaired balance, decreased upper extremity function, decreased lower extremity function, impaired cardiopulmonary response to activity, decreased safety awareness, decreased ROM.     Increased difficulty obtaining accurate SPO2 during OT session, however, pt did not appear or report any distress. Pt progressing well toward OT goals. Pt with potential discharge home today. Continue OT POC as indicated.     Rehab Prognosis:  Good; patient would benefit from acute skilled OT services to address these deficits and reach maximum level of function.       Plan:     Patient to be seen (5-6x/week) to address the above listed problems via self-care/home management, therapeutic activities, therapeutic exercises  · Plan of Care Expires: 09/28/20  · Plan of Care Reviewed with: patient    Subjective   Pt agreeable to therapy.  Pain/Comfort:  · Pain Rating 1: 0/10    Objective:     Communicated with: nurse Lafleur prior to session.  Patient found HOB elevated with bed alarm, 3L oxygen, telemetry, peripheral IV upon OT entry to room.    General Precautions: Standard, fall, respiratory   Orthopedic Precautions:N/A   Braces: N/A     Occupational Performance: Pt on 3L O2 NC upon arrival ( pts baseline).  Increased difficulty and time spent in attempts to obtain SPO2, pulse oximeter readings inconsistent ranging from 80-87%. Per nurse, okay to increase up to 5L O2 with activity. Increased pt to 4& 5 L for exertion, however, readings continued to be difficult to obtain and vary 83-95%. Pt did not appear in distress and denied any SOB. There is a possibility that SPO2 readings were inaccurate. Nurse notified and present.     Bed Mobility:    · Patient completed Supine to Sit with supervision     Functional Mobility/Transfers:  · Patient completed Sit <> Stand Transfer with stand by assistance  with  no assistive device   · Patient completed Bed <> Chair Transfer using Stand Pivot technique with stand by assistance with no assistive device    Activities of Daily Living:  · Lower Body Dressing: Pt was able to adjust socks while long sitting in bed, SBA    · Toileting: modified independence to use urinal in seated position   · Grooming: set-up assistance to perform hand hygiene seated with wipes      AMPAC 6 Click ADL: 22    Treatment & Education:   Pt re-educated on OT role/POC   Importance of OOB activity with staff assistance to maximize recovery, OOB>chair throughout day as tolerated and for all meals   Importance of performing UE/LE therex throughout day to increase strength and endurance  .Pt completed 1 x 12 BUE AROM in seated unsupported EOB:  Digits I-V flexion/extension   Wrist flexion/extension  Forearm pronation/supination  Elbow flexion/extension  Shoulder elevation/depression   HEP handout placed in blue folder    Pt encouraged to complete x 12 reps, 2x/a day. Educated pt on importance of utilizing breathing techniques and slow pace.    Edu on PLB technique with all exertion; provided with handout   Safety with functional transfers and ADL tasks   Energy conservation techniques; self-pacing with daily tasks; frequent rest breaks for recovery; instructed on importance of not over exerting self with  therex and save energy for daily tasks. Handout provided   Home safety/fall prevention education   Multiple self-care tasks/functional mobility completed- assistance level noted above    Pt verbalizes understanding of all education provided this date. All questions/concerns answered within OT scope of practice      Patient left reclined up in chair with all lines intact, call button in reach and nurse notifiedEducation:      GOALS:   Multidisciplinary Problems     Occupational Therapy Goals        Problem: Occupational Therapy Goal    Goal Priority Disciplines Outcome Interventions   Occupational Therapy Goal     OT, PT/OT Ongoing, Progressing    Description: Goals to be met by: 09/28/20     Patient will increase functional independence with ADLs by performing:    UE Dressing with Set-up Assistance.  LE Dressing with Supervision.  Grooming while standing at sink with Supervision.  Toileting from bedside commode with Supervision for hygiene and clothing management.   Supine to sit with Supervision.  Step transfer with Supervision  Toilet transfer to bedside commode with Supervision.  Upper extremity exercise program x12 reps per handout, with supervision.  Bed <> wheelchair stand pivot transfer with Supervision.                      Time Tracking:     OT Date of Treatment: 09/16/20  OT Start Time: 0945  OT Stop Time: 1010  OT Total Time (min): 25 min    Billable Minutes:Therapeutic Activity 15  Therapeutic Exercise 10    YOLIS Villegas  9/16/2020

## 2020-09-16 NOTE — PLAN OF CARE
09/16/20 1003   Medicare Message   Important Message from Medicare regarding Discharge Appeal Rights Given to patient/caregiver;Explained to patient/caregiver  (IMM completed with pt's daughter, Jocelyn, [phone]. Jocelyn verbalized understanding. IMM emailed to [email or web address])   Date IMM was signed 09/16/20   Time IMM was signed 0914

## 2020-09-16 NOTE — PLAN OF CARE
Problem: Fall Injury Risk  Goal: Absence of Fall and Fall-Related Injury  Outcome: Ongoing, Progressing     Problem: Fall Injury Risk  Goal: Absence of Fall and Fall-Related Injury  Intervention: Identify and Manage Contributors to Fall Injury Risk  Flowsheets (Taken 9/16/2020 1638)  Self-Care Promotion: BADL personal objects within reach  Medication Review/Management:   medications reviewed   high risk medications identified     Problem: Fall Injury Risk  Goal: Absence of Fall and Fall-Related Injury  Intervention: Promote Injury-Free Environment  Flowsheets (Taken 9/16/2020 1638)  Safety Promotion/Fall Prevention:   assistive device/personal item within reach   bed alarm set   side rails raised x 2   medications reviewed   high risk medications identified  Environmental Safety Modification:   assistive device/personal items within reach   clutter free environment maintained   lighting adjusted     Problem: Infection COPD (Chronic Obstructive Pulmonary Disease)  Goal: Absence of Infection Signs/Symptoms  Intervention: Prevent or Manage Infection  Flowsheets (Taken 9/16/2020 1638)  Fever Reduction/Comfort Measures: (IV abx) medication administered     Problem: Respiratory Compromise COPD (Chronic Obstructive Pulmonary Disease)  Goal: Effective Oxygenation and Ventilation  Intervention: Promote Airway Secretion Clearance  Flowsheets (Taken 9/16/2020 1638)  Cough And Deep Breathing: done independently per patient  Activity Management: ambulated in room - L4     Problem: Respiratory Compromise COPD (Chronic Obstructive Pulmonary Disease)  Goal: Effective Oxygenation and Ventilation  Intervention: Optimize Oxygenation and Ventilation  Flowsheets (Taken 9/16/2020 1638)  Airway/Ventilation Management: (neb tx Q6)   airway patency maintained   pulmonary hygiene promoted

## 2020-09-16 NOTE — PLAN OF CARE
[facility]    HOME HEALTH ORDERS  FACE TO FACE ENCOUNTER    Patient Name: Enzo Wilson  YOB: 1940    PCP: Lewis Self MD   PCP Address: [address] [place] [address]  PCP Phone Number: [phone]  PCP Fax: [phone]    Encounter Date: 09/16/2020    Admit to Home Health    Diagnoses:  Active Hospital Problems    Diagnosis  POA    *Acute on chronic respiratory failure with hypoxia and hypercapnia [J96.21, J96.22]  Yes     PH of 7.2.  PCO2 of 99      COPD exacerbation [J44.1]  Yes    Palliative care encounter [Z51.5]  Not Applicable    Tobacco abuse [Z72.0]  Yes     Chronic    Chronic respiratory failure [J96.10]  Yes     Chronic    Pulmonary nodule [R91.1]  No    Mild malnutrition [E44.1]  Yes    Pulmonary artery hypertension [I27.21]  Yes     Chronic    Essential hypertension [I10]  Yes     Chronic    COPD (chronic obstructive pulmonary disease) [J44.9]  Yes     Chronic      Resolved Hospital Problems   No resolved problems to display.       Future Appointments   Date Time Provider Department Center   9/29/2020  9:20 AM Lewis Self MD [facility]     Follow-up Information     Lewis Self MD. Go on 9/29/2020.    Specialty: Family Medicine  Why: @ 9:20am  Out-Patient Primary Care Hospital Follow-Up  Contact information:  [address] [id number] [phone]             Lewis Self MD.    Specialty: Family Medicine  Contact information:  [address] [place] [address]  [phone]                     I have seen and examined this patient face to face today. My clinical findings that support the need for the home health skilled services and home bound status are the following:  Weakness/numbness causing balance and gait disturbance due to COPD Exacerbation and Weakness/Debility making it taxing to leave home.    Allergies:Review of patient's allergies indicates:  No Known Allergies    Diet: cardiac diet    Activities:  activity as tolerated    Nursing:   SN to complete comprehensive assessment including routine vital signs. Instruct on disease process and s/s of complications to report to MD. Review/verify medication list sent home with the patient at time of discharge  and instruct patient/caregiver as needed. Frequency may be adjusted depending on start of care date.    Notify MD if SBP > 160 or < 90; DBP > 90 or < 50; HR > 120 or < 50; Temp > 101; Other:         CONSULTS:    Physical Therapy to evaluate and treat. Evaluate for home safety and equipment needs; Establish/upgrade home exercise program. Perform / instruct on therapeutic exercises, gait training, transfer training, and Range of Motion.  Occupational Therapy to evaluate and treat. Evaluate home environment for safety and equipment needs. Perform/Instruct on transfers, ADL training, ROM, and therapeutic exercises.    MISCELLANEOUS CARE:  Routine Skin for Bedridden Patients: Instruct patient/caregiver to apply moisture barrier cream to all skin folds and wet areas in perineal area daily and after baths and all bowel movements.    WOUND CARE ORDERS  n/a    Medications: Review discharge medications with patient and family and provide education.      I certify that this patient is confined to his home and needs intermittent skilled nursing care, physical therapy and occupational therapy.

## 2020-09-16 NOTE — PLAN OF CARE
09/16/20 1215   Final Note   Assessment Type Final Discharge Note   Anticipated Discharge Disposition Home-Health   What phone number can be called within the next 1-3 days to see how you are doing after discharge? [phone]   Hospital Follow Up  Appt(s) scheduled? Yes   Discharge plans and expectations educations in teach back method with documentation complete? Yes   Right Care Referral Info   Post Acute Recommendation Home-care   Referral Type Home Care   [place] [address]   Post-Acute Status   Post-Acute Authorization Home Health   HME Status Set-up Complete   Home Health Status Set-up Complete   Patient choice form signed by patient/caregiver List with quality metrics by geographic area provided   Discharge Delays None known at this time

## 2020-09-16 NOTE — PLAN OF CARE
Problem: Physical Therapy Goal  Goal: Physical Therapy Goal  Description: Goals to be met by:     Patient will increase functional independence with mobility by performin. Supine to sit with Modified [place]  2. Sit to supine with Modified [place]  3. Sit to stand transfer with Modified [place] with or without RW.  4. Bed to chair transfer with Modified [place] using with or without Rolling Walker and oxygen.  5. Gait  x 250 feet with Modified [place] using with or without Rolling Walker and oxygen.  6. Ascend/descend 1 flight of stairs with bilateral Handrails Modified [place] and oxygen.  7. Lower extremity exercise program x20 reps per handout, with independence    Outcome: Ongoing, Progressing    Pt tolerated seated activities well on 3L O2 NC, however required an increased of O2 to 4L during standing activities and ambulation.  Pt able to ambulate in the hallway with CGA using no AD and was asymptomatic.  Pt with mild unsteadiness.

## 2020-09-16 NOTE — PLAN OF CARE
09/16/20 1018   Post-Acute Status   Post-Acute Authorization Home Health;HME  (Home)   HME Status Pending Payor Review   Home Health Status Pending Payor Review   Patient choice form signed by patient/caregiver List with quality metrics by geographic area provided   Discharge Delays None known at this time   Discharge Plan   Discharge Plan A Home with family     HALEY contacted pt's daughter, Jocelyn, to discuss d/c planning, help at home, and preferences. HALEY explained PT/OT has recommended HH. HALEY asked Jocelyn, for preferences. According to Jocelyn, she would like pt to stay within the [facility] system. Referral [facility]ed to [facility]. HALEY waiting confirmation of services.     EDUCATION completed with Jocelyn on COPD.  Information included:  signs and symptoms to look for at discharge. HALEY instructed Jocelyn to call the doctor if pt is experiencing symptoms that may indicate a medical emergency: CALL 911 if signs and symptoms worsen. HALEY asked pt to provide SW with two signs and symptoms recently discussed.  According to Jocelyn, she pt's smokes and drink alcohol and refuses to stop. Reminded Jocelyn things she will be responsible for to her pt manage his healthcare at home: getting Rx filled, attending follow up appointments, and taking medication as prescribed were discussed.   Teach back method used.  All questions answered.  Patient verbalized understanding of all information.       11:00am  [facility] declined.     11:13am  Referral sent to [facility] care. HALEY waiting on response.     11:38am  HALEY faxed clinicals to three other [employer] companies. HALEY waiting on response.

## 2020-09-16 NOTE — DISCHARGE SUMMARY
[facility] Hospital Medicine  Discharge Summary      Patient Name: Enzo Wilson  MRN: [id number]  Admission Date: 9/12/2020  Hospital Length of Stay: 4 days  Discharge Date and Time:  09/16/2020 10:25 AM  Attending Physician: Cem Raymond MD   Discharging Provider: Cem Raymond MD  Primary Care Provider: Lewis Self MD      HPI:   Enzo Wilson is a 80 y.o. male that (in part)  has a past medical history of CHF (congestive heart failure), COPD (chronic obstructive pulmonary disease), Hypertension, Respiratory distress, Squamous cell cancer of epiglottis, and Vocal cord polyps.  has a past surgical history that includes Tonsillectomy and Excision of mass of back (N/A, 6/10/2019). Presents to [facility] Emergency Department complaining of cough associated with shortness of breath.  Subacute onset 2 days ago with progressive worsening.  Worsened with ambulation and minimal exertion.  Minimally relieved with rest.  On home oxygen with nasal cannula at 3 L. was given DuoNebs and supplemental oxygen by EMS with initial improvement in oxygenation followed by a subsequent decline again once he was in the emergency department.  Denies recent travel or sick contacts.  No COVID-19 exposure.  Smokes cigarettes.  History of COPD.  Reports compliance with home medication regimen.  Denies hemoptysis.  Positive for wheezing.  Denies stridor or night sweats.  Has dyspnea with exertion.  Moderate to severe intensity.  Daily frequency.  Constant duration.    In the emergency department routine laboratory studies, chest x-ray, EKG, cardiac enzymes were obtained.  Unable to maintain oxygenation with nasal cannula.  Non-rebreather given for short time.  ABG performed which was concerning for pH of 7.2 and a pCO2 of 99.1, and PO2 of 62.  Was given empiric antibiotics, DuoNeb treatment, steroids, and started on BiPAP.  Being admitted for acute on chronic hypoxemic  and hypercapnic respiratory failure secondary to COPD exacerbation.    Hospital medicine has been asked to admit to inpatient for further evaluation and treatment.     * No surgery found *      Hospital Course:   Enzo Wilson is a 80 y.o. male that (in part)  has a past medical history of  COPD (chronic obstructive pulmonary disease), Hypertension, Respiratory distress, Squamous cell cancer of epiglottis, and Vocal cord polyps.  has a past surgical history that includes Tonsillectomy and Excision of mass of back (N/A, 6/10/2019). Presents to [facility] Emergency Department complaining of cough associated with shortness of breath.  Subacute onset 2 days ago with progressive worsening.  Worsened with ambulation and minimal exertion.  Minimally relieved with rest.  On home oxygen with nasal cannula at 3 L. was given DuoNebs and supplemental oxygen by EMS with initial improvement in oxygenation followed by a subsequent decline again once he was in the emergency department.  Denies recent travel or sick contacts.  No COVID-19 exposure.  Smokes cigarettes.  History of COPD.  Reports compliance with home medication regimen.  Denies hemoptysis.  Positive for wheezing.  Denies stridor or night sweats.  Has dyspnea with exertion.  Moderate to severe intensity.  Daily frequency.  Constant duration.  In the emergency department routine laboratory studies, chest x-ray, EKG, cardiac enzymes were obtained.  Unable to maintain oxygenation with nasal cannula.  Non-rebreather given for short time.  ABG performed which was concerning for pH of 7.2 and a pCO2 of 99.1, and PO2 of 62.  Was given empiric antibiotics, DuoNeb treatment, steroids, and started on BiPAP.  Was  admitted to ICU for acute on chronic hypoxemic and hypercapnic respiratory failure secondary to COPD exacerbation.  He required  BIPAP support. Treatment initiated with IV steroids, NEBS, O2, and empiric Levaquin. Pulmonary was consulted.his  symptoms improved,he was transfered to floor and remains stable,did PT,OT and HH with DME at DC time is arranged.palliative care was following patient,he was agree with DNR status,no intubation,no CPR no shock,nurse baron witnessed,DNR order is placed.  At DC time patient was on baseline on 3 liter oxygen,alert with no SOB.he was discharged home with HH and DME and follow up with PCP as out patient.       Consults:   Consults (From admission, onward)        Status Ordering Provider     Inpatient consult to Palliative Care  Once     Provider:  Christy Cotto NP    Completed JOSE SALEH     Inpatient consult to Pulmonology  Once     Provider:  Lobito Vo MD    Completed JOSE SALEH     Inpatient consult to Social Work/Case Management  Once     Provider:  (Not yet assigned)    Completed JOSE SALEH.          No new Assessment & Plan notes have been filed under this hospital service since the last note was generated.  Service: Hospital Medicine    Final Active Diagnoses:    Diagnosis Date Noted POA    PRINCIPAL PROBLEM:  Acute on chronic respiratory failure with hypoxia and hypercapnia [J96.21, J96.22] 09/12/2020 Yes    COPD exacerbation [J44.1] 09/13/2020 Yes    Palliative care encounter [Z51.5] 09/13/2020 Not Applicable    Tobacco abuse [Z72.0] 02/12/2016 Yes     Chronic    Chronic respiratory failure [J96.10] 02/12/2016 Yes     Chronic    Pulmonary nodule [R91.1] 12/08/2014 No    Mild malnutrition [E44.1] 10/24/2014 Yes    Pulmonary artery hypertension [I27.21] 11/20/2012 Yes     Chronic    Essential hypertension [I10]  Yes     Chronic    COPD (chronic obstructive pulmonary disease) [J44.9] 09/19/2012 Yes     Chronic      Problems Resolved During this Admission:       Discharged Condition: stable    Disposition: [facility]-Health Care [facility]    Follow Up:  Follow-up Information     Lewis Self MD. Go on 9/29/2020.    Specialty: Family Medicine  Why: @ 9:20am  Out-Patient Primary Care  "Hospital Follow-Up  Contact information:  [address] [id number] [phone]             Lewis Self MD.    Specialty: Family Medicine  Contact information:  [address] [address] [phone]                 Patient Instructions:      WALKER FOR HOME USE     Order Specific Question Answer Comments   Type of Walker: Adult (5'4"-6'6")    With wheels? Yes    Height: 6' 3" (1.905 m)    Weight: 54.4 kg (119 lb 14.9 oz)    Length of need (1-99 months): 99    Does patient have medical equipment at home? oxygen    Please check all that apply: Patient's condition impairs ambulation.    Please check all that apply: Patient is unable to safely ambulate without equipment.      COMMODE FOR HOME USE     Order Specific Question Answer Comments   Type: Standard    Height: 6' 3" (1.905 m)    Weight: 54.4 kg (119 lb 14.9 oz)    Does patient have medical equipment at home? oxygen    Length of need (1-99 months): 99      TRANSFER TUB BENCH FOR HOME USE     Order Specific Question Answer Comments   Type of Transfer Tub Bench: Unpadded    Height: 6' 3" (1.905 m)    Weight: 54.4 kg (119 lb 14.9 oz)    Does patient have medical equipment at home? oxygen    Length of need (1-99 months): 99      Ambulatory referral/consult to Pulmonology   Standing Status: Future   Referral Priority: Routine Referral Type: Consultation   Referral Reason: Specialty Services Required   Requested Specialty: Pulmonary Disease   Number of Visits Requested: 1     Ambulatory referral/consult to Pulmonary Rehab   Standing Status: Future   Referral Priority: Routine Referral Type: Consultation   Referral Reason: Specialty Services Required   Requested Specialty: Pulmonary Disease   Number of Visits Requested: 1     Activity as tolerated       Significant Diagnostic Studies: Labs:   BMP:   Recent Labs   Lab 09/15/20  0525 09/16/20  0530    93    137   K 4.2 4.5   CL 90* 90*   CO2 44* 44*   BUN 30* 34*   CREATININE 0.8 0.9 "   CALCIUM 9.0 9.1   MG 2.1 2.3   , CMP   Recent Labs   Lab 09/15/20  0525 09/16/20  0530    137   K 4.2 4.5   CL 90* 90*   CO2 44* 44*    93   BUN 30* 34*   CREATININE 0.8 0.9   CALCIUM 9.0 9.1   ANIONGAP 5* 3*   ESTGFRAFRICA >60 >60   EGFRNONAA >60 >60    and CBC   Recent Labs   Lab 09/15/20  0525 09/16/20  0530   WBC 7.47 7.41   HGB 12.5* 12.6*   HCT 41.2 41.1    150     Radiology: X-Ray: CXR: X-Ray Chest 1 View (CXR): No results found for this visit on 09/12/20. and X-Ray Chest PA and Lateral (CXR): No results found for this visit on 09/12/20.    Pending Diagnostic Studies:     None         Medications:  Reconciled Home Medications:      Medication List      CONTINUE taking these medications    albuterol 90 mcg/actuation inhaler  Commonly known as: PROVENTIL/VENTOLIN HFA  Inhale 1-2 puffs into the lungs every 6 (six) hours as needed for Wheezing.     albuterol-ipratropium 2.5 mg-0.5 mg/3 mL nebulizer solution  Commonly known as: DUO-NEB  USE 3 ML VIA NEBULIZER EVERY 6 HOURS AS NEEDED FOR WHEEZING OR SHORTNESS OF BREATH     amLODIPine 10 MG tablet  Commonly known as: NORVASC  Take 1 tablet (10 mg total) by mouth once daily.     fluticasone-salmeterol 250-50 mcg/dose 250-50 mcg/dose diskus inhaler  Commonly known as: ADVAIR DISKUS  Inhale 1 puff into the lungs 2 (two) times daily.     oxyCODONE-acetaminophen 5-325 mg per tablet  Commonly known as: PERCOCET  Take 1 tablet by mouth every 4 (four) hours as needed for Pain.            Indwelling Lines/Drains at time of discharge:   Lines/Drains/Airways     None                 Time spent on the discharge of patient: over 30  minutes  Patient was seen and examined on the date of discharge and determined to be suitable for discharge.         Cem Raymond MD  Department of Hospital Medicine  [facility]

## 2020-09-16 NOTE — PROGRESS NOTES
HOW TO MANAGE YOUR CARE  AT HOME:  TN taught Symptoms and Problems for COPD home care with pt and with teach back:  1. Coughing up brown stuff, 2.Feet swelling,3. Using my inhaler more. TN placed education sheet in [employer]..     HELP AT HOME TO ASSIST WITH PATIENT'S RECOVERY IS Gisell malone.    Preference is for [facility] [address] [facility].      TN taught patient about things he is responsible for when discharged TO HELP WITH HIS RECOVERY:  How to Manage His Care At Home:  1. Getting his prescriptions filled.  2. Taking his medications as directed. DO NOT MISS ANY DOSES!  3. Going to his follow-up doctor appointments.   .  Ashlyn Lakhani RN, BSN, STN CCM

## 2020-09-16 NOTE — PLAN OF CARE
Problem: Occupational Therapy Goal  Goal: Occupational Therapy Goal  Description: Goals to be met by: 09/28/20     Patient will increase functional independence with ADLs by performing:    UE Dressing with Set-up Assistance.  LE Dressing with Supervision.  Grooming while standing at sink with Supervision.  Toileting from bedside commode with Supervision for hygiene and clothing management.   Supine to sit with Supervision.  Step transfer with Supervision  Toilet transfer to bedside commode with Supervision.  Upper extremity exercise program x12 reps per handout, with supervision.  Bed <> wheelchair stand pivot transfer with Supervision.     Outcome: Ongoing, Progressing   Difficulty obtaining accurate SPO2 during session, however, pt did not appear or report any distress. Pt progressing well toward OT goals. Pt with potential discharge home today. Continue OT POC as indicated.

## 2020-09-16 NOTE — PROGRESS NOTES
Pt IV d/c'd w/tip intact 2x2 & tape applied, pt given discharge & f/u info, pt w/all DME at bedside awaiting ride home and family w/home 02

## 2020-09-16 NOTE — PROGRESS NOTES
Unsure about pt's discharge status, unsure of notes by CM, never received a call stating that everything was set up, reached out to Mrs Matthews inquiring about discharge status, she stated she will check and call me back

## 2020-09-17 ENCOUNTER — PATIENT OUTREACH (OUTPATIENT)
Dept: ADMINISTRATIVE | Facility: CLINIC | Age: 80
End: 2020-09-17

## 2020-09-17 LAB
BACTERIA BLD CULT: NORMAL
BACTERIA BLD CULT: NORMAL

## 2020-09-17 PROCEDURE — G0180 MD CERTIFICATION HHA PATIENT: HCPCS | Mod: ,,, | Performed by: HOSPITALIST

## 2020-09-17 PROCEDURE — G0180 PR HOME HEALTH MD CERTIFICATION: ICD-10-PCS | Mod: ,,, | Performed by: HOSPITALIST

## 2020-09-17 NOTE — PT/OT/SLP PROGRESS
Physical Therapy Treatment    Patient Name:  Enzo Wilson   MRN:  [id number]    Recommendations:     Discharge Recommendations:  home health PT(with family supervision/assistance)   Discharge Equipment Recommendations: wheelchair, bedside commode, bath bench   Barriers to discharge: None    Assessment:     Enzo Wilson is a 80 y.o. male admitted with a medical diagnosis of Acute on chronic respiratory failure with hypoxia and hypercapnia.  He presents with the following impairments/functional limitations:  weakness, impaired endurance, impaired self care skills, impaired functional mobilty, gait instability, impaired balance, decreased lower extremity function, decreased safety awareness, impaired cardiopulmonary response to activity. Pt ramana therex well with good technique.  Pt demo'd understanding of LE TE seated.    Rehab Prognosis: Good; patient would benefit from acute skilled PT services to address these deficits and reach maximum level of function.    Recent Surgery: * No surgery found *      Plan:     During this hospitalization, patient to be seen 5 x/week to address the identified rehab impairments via gait training, therapeutic activities, therapeutic exercises, neuromuscular re-education and progress toward the following goals:    · Plan of Care Expires:  09/28/20    Subjective     Chief Complaint: nine  Patient/Family Comments/goals:  Pt agreed to therapy  Pain/Comfort:  ·        Objective:     Communicated with nurse prior to session.  Patient found up in chair with peripheral IV, oxygen upon PT entry to room.     General Precautions: Standard, respiratory, fall   Orthopedic Precautions:N/A   Braces:         AM-PAC 6 CLICK MOBILITY          Therapeutic Activities and Exercises:   BLE TE seated 2x 10; LAQ, HIP FLEXION, HR, TT    Patient left up in chair with all lines intact, call button in reach, chair alarm on and nurse notified..    GOALS:   Multidisciplinary Problems     Physical Therapy  Goals        Problem: Physical Therapy Goal    Goal Priority Disciplines Outcome Goal Variances Interventions   Physical Therapy Goal     PT, PT/OT Ongoing, Progressing     Description: Goals to be met by:     Patient will increase functional independence with mobility by performin. Supine to sit with Modified [place]  2. Sit to supine with Modified [place]  3. Sit to stand transfer with Modified [place] with or without RW.  4. Bed to chair transfer with Modified [place] using with or without Rolling Walker and oxygen.  5. Gait  x 250 feet with Modified [place] using with or without Rolling Walker and oxygen.  6. Ascend/descend 1 flight of stairs with bilateral Handrails Modified [place] and oxygen.  7. Lower extremity exercise program x20 reps per handout, with independence                     Time Tracking:     PT Received On: 20  PT Start Time: 1220     PT Stop Time: 1230  PT Total Time (min): 10 min     Billable Minutes: Therapeutic Exercise 10    Treatment Type: Treatment  PT/PTA: PTA     PTA Visit Number: Jessica     Kayden Laguna, PTA  2020

## 2020-09-17 NOTE — PT/OT/SLP DISCHARGE
Physical Therapy Discharge Summary    Name: Enzo Wilson  MRN: [id number]   Principal Problem: Acute on chronic respiratory failure with hypoxia and hypercapnia     Patient Discharged from acute Physical Therapy on 20.  Please refer to prior PT notes for functional status.     Assessment:     Patient appropriate for care in another setting.    Objective:     GOALS:   Multidisciplinary Problems     Physical Therapy Goals        Problem: Physical Therapy Goal    Goal Priority Disciplines Outcome Goal Variances Interventions   Physical Therapy Goal     PT, PT/OT Ongoing, Progressing     Description: Goals to be met by:     Patient will increase functional independence with mobility by performin. Supine to sit with Modified [place]  2. Sit to supine with Modified [place]  3. Sit to stand transfer with Modified [place] with or without RW.  4. Bed to chair transfer with Modified [place] using with or without Rolling Walker and oxygen.  5. Gait  x 250 feet with Modified [place] using with or without Rolling Walker and oxygen.  6. Ascend/descend 1 flight of stairs with bilateral Handrails Modified [place] and oxygen.  7. Lower extremity exercise program x20 reps per handout, with independence                     Reasons for Discontinuation of Therapy Services  Transfer to alternate level of care.      Plan:     Patient Discharged to: Home with Home Health Service.    Radha Ramsey, PT  2020

## 2020-09-17 NOTE — PATIENT INSTRUCTIONS
COPD Flare    You have had a flare-up of your COPD.  COPD, or chronic obstructive pulmonary disease, is a common lung disease. It causes your airways to become irritated and narrower. This makes it harder for you to breathe. Emphysema and chronic bronchitis are both types of COPD. This is a chronic condition, which means you always have it. Sometimes it gets worse. When this happens, it is called a flare-up.  Symptoms of COPD  People with COPD may have symptoms most of the time. In a flare-up, your symptoms get worse. These symptoms may mean you are having a flare-up:  · Shortness of breath, shallow or rapid breathing, or wheezing that gets worse  · Lung infection  · Cough that gets worse  · More mucus, thicker mucus or mucus of a different color  · Tiredness, decreased energy, or trouble doing your usual activities  · Fever  · Chest tightness  · Your symptoms dont get better even when you use your usual medicines, inhalers, and nebulizer  · Trouble talking  · You feel confused  Causes of flare-ups  Unfortunately, a flare-up can happen even though you did everything right, and you followed your doctors instructions. Some causes of flare-ups are:  · Smoking or secondhand smoke  · Colds, the flu, or respiratory infections  · Air pollution  · Sudden change in the weather  · Dust, irritating chemicals, or strong fumes  · Not taking your medicines as prescribed  Home care  Here are some things you can do at home to treat a flare-up:  · Try not to panic. This makes it harder to breathe, and keeps you from doing the right things.  · Dont smoke or be around others who are smoking.  · Try to drink more fluids than usual during a flare-up, unless your doctor has told you not to because of heart and kidney problems. More fluids can help loosen the mucus.  · Use your inhalers and nebulizer, if you have one, as you have been told to.  · If you were given antibiotics, take them until they are used up or your doctor tells you  to stop. Its important to finish the antibiotics, even though you feel better. This will make sure the infection has cleared.  · If you were given prednisone or another steroid, finish it even if you feel better.  Preventing a flare-up  Even though flare-ups happen, the best way to treat one is to prevent it before it starts. Here are some pointers:  · Dont smoke or be around others who are smoking.  · Take your medicines as you have been told.  · Talk with your doctor about getting a flu shot every year. Also find out if you need a pneumonia shot.  · If there is a weather advisory warning to stay indoors, try to stay inside when possible.  · Try to eat healthy and get plenty of sleep.  · Try to avoid things that usually set you off, like dust, chemical fumes, hairsprays, or strong perfumes.  Follow-up care  Follow up with your healthcare provider, or as advised.  If a culture was done, you will be told if your treatment needs to be changed. You can call as directed for the results.  If X-rays were done, you will be notified of any new findings that may affect your care.  Call 911  Call 911 if any of these occur:  · You have trouble breathing  · You feel confused or its difficult to wake you up  · You faint or lose consciousness  · You have a rapid heart rate  · You have new pain in your chest, arm, shoulder, neck or upper back  When to seek medical advice  Call your healthcare provider right away if any of these occur:  · Wheezing or shortness of breath gets worse  · You need to use your inhalers more often than usual without relief  · Fever of 100.4°F (38ºC) or higher, or as directed by your healthcare provider  · Coughing up lots of dark-colored or bloody mucus (sputum)  · Chest pain with each breath  · You do not start to get better within 24 hours  · Swelling of your ankles gets worse  · Dizziness or weakness  Date Last Reviewed: 9/1/2016  © 7210-3439 [employer]. [address] [facility],  [place] [address]. All rights reserved. This information is not intended as a substitute for professional medical care. Always follow your healthcare professional's instructions.

## 2020-09-21 NOTE — PT/OT/SLP DISCHARGE
Occupational Therapy Discharge Summary    Enzo Wilson  MRN: [id number]   Principal Problem: Acute on chronic respiratory failure with hypoxia and hypercapnia      Patient Discharged from acute Occupational Therapy on 09/16/20.  Please refer to prior OT notes for functional status.    Assessment:      Patient appropriate for care in another setting.    Objective:     GOALS:   Multidisciplinary Problems     Occupational Therapy Goals        Problem: Occupational Therapy Goal    Goal Priority Disciplines Outcome Interventions   Occupational Therapy Goal     OT, PT/OT Ongoing, Progressing    Description: Goals to be met by: 09/28/20     Patient will increase functional independence with ADLs by performing:    UE Dressing with Set-up Assistance.  LE Dressing with Supervision.  Grooming while standing at sink with Supervision.  Toileting from bedside commode with Supervision for hygiene and clothing management.   Supine to sit with Supervision.  Step transfer with Supervision  Toilet transfer to bedside commode with Supervision.  Upper extremity exercise program x12 reps per handout, with supervision.  Bed <> wheelchair stand pivot transfer with Supervision.                      Reasons for Discontinuation of Therapy Services  Transfer to alternate level of care.      Plan:     Patient Discharged to: Home with Home Health Service    Stefanie Portillo OT  9/21/2020

## 2020-09-24 ENCOUNTER — EXTERNAL HOME HEALTH (OUTPATIENT)
Dept: HOME HEALTH SERVICES | Facility: HOSPITAL | Age: 80
End: 2020-09-24
Payer: MEDICARE

## 2020-09-29 ENCOUNTER — PATIENT MESSAGE (OUTPATIENT)
Dept: OTHER | Facility: OTHER | Age: 80
End: 2020-09-29

## 2020-09-30 ENCOUNTER — EXTERNAL CHRONIC CARE MANAGEMENT (OUTPATIENT)
Dept: PRIMARY CARE CLINIC | Facility: CLINIC | Age: 80
End: 2020-09-30
Payer: MEDICARE

## 2020-09-30 PROCEDURE — 99490 PR CHRONIC CARE MGMT, 1ST 20 MIN: ICD-10-PCS | Mod: S$GLB,,, | Performed by: FAMILY MEDICINE

## 2020-09-30 PROCEDURE — 99490 CHRNC CARE MGMT STAFF 1ST 20: CPT | Mod: S$GLB,,, | Performed by: FAMILY MEDICINE

## 2020-10-13 ENCOUNTER — OFFICE VISIT (OUTPATIENT)
Dept: FAMILY MEDICINE | Facility: CLINIC | Age: 80
End: 2020-10-13
Payer: MEDICARE

## 2020-10-13 VITALS
BODY MASS INDEX: 15.43 KG/M2 | HEIGHT: 70 IN | OXYGEN SATURATION: 94 % | TEMPERATURE: 98 F | SYSTOLIC BLOOD PRESSURE: 110 MMHG | HEART RATE: 97 BPM | DIASTOLIC BLOOD PRESSURE: 64 MMHG | WEIGHT: 107.81 LBS

## 2020-10-13 DIAGNOSIS — J44.9 CHRONIC OBSTRUCTIVE PULMONARY DISEASE, UNSPECIFIED COPD TYPE: Primary | Chronic | ICD-10-CM

## 2020-10-13 DIAGNOSIS — I10 ESSENTIAL HYPERTENSION: Chronic | ICD-10-CM

## 2020-10-13 DIAGNOSIS — Z23 NEED FOR IMMUNIZATION AGAINST INFLUENZA: ICD-10-CM

## 2020-10-13 DIAGNOSIS — Z72.0 TOBACCO ABUSE: ICD-10-CM

## 2020-10-13 PROBLEM — J96.21 ACUTE ON CHRONIC RESPIRATORY FAILURE WITH HYPOXIA AND HYPERCAPNIA: Status: RESOLVED | Noted: 2020-09-12 | Resolved: 2020-10-13

## 2020-10-13 PROBLEM — J96.22 ACUTE ON CHRONIC RESPIRATORY FAILURE WITH HYPOXIA AND HYPERCAPNIA: Status: RESOLVED | Noted: 2020-09-12 | Resolved: 2020-10-13

## 2020-10-13 PROBLEM — J44.1 COPD EXACERBATION: Status: RESOLVED | Noted: 2020-09-13 | Resolved: 2020-10-13

## 2020-10-13 PROCEDURE — 90694 FLU VACCINE - QUADRIVALENT - ADJUVANTED: ICD-10-PCS | Mod: HCNC,S$GLB,, | Performed by: FAMILY MEDICINE

## 2020-10-13 PROCEDURE — 1126F PR PAIN SEVERITY QUANTIFIED, NO PAIN PRESENT: ICD-10-PCS | Mod: HCNC,S$GLB,, | Performed by: FAMILY MEDICINE

## 2020-10-13 PROCEDURE — 1101F PR PT FALLS ASSESS DOC 0-1 FALLS W/OUT INJ PAST YR: ICD-10-PCS | Mod: HCNC,CPTII,S$GLB, | Performed by: FAMILY MEDICINE

## 2020-10-13 PROCEDURE — 1101F PT FALLS ASSESS-DOCD LE1/YR: CPT | Mod: HCNC,CPTII,S$GLB, | Performed by: FAMILY MEDICINE

## 2020-10-13 PROCEDURE — G0008 ADMIN INFLUENZA VIRUS VAC: HCPCS | Mod: HCNC,S$GLB,, | Performed by: FAMILY MEDICINE

## 2020-10-13 PROCEDURE — 99999 PR PBB SHADOW E&M-EST. PATIENT-LVL III: CPT | Mod: PBBFAC,HCNC,, | Performed by: FAMILY MEDICINE

## 2020-10-13 PROCEDURE — 1159F PR MEDICATION LIST DOCUMENTED IN MEDICAL RECORD: ICD-10-PCS | Mod: HCNC,S$GLB,, | Performed by: FAMILY MEDICINE

## 2020-10-13 PROCEDURE — 99213 OFFICE O/P EST LOW 20 MIN: CPT | Mod: HCNC,25,S$GLB, | Performed by: FAMILY MEDICINE

## 2020-10-13 PROCEDURE — 1126F AMNT PAIN NOTED NONE PRSNT: CPT | Mod: HCNC,S$GLB,, | Performed by: FAMILY MEDICINE

## 2020-10-13 PROCEDURE — G0008 FLU VACCINE - QUADRIVALENT - ADJUVANTED: ICD-10-PCS | Mod: HCNC,S$GLB,, | Performed by: FAMILY MEDICINE

## 2020-10-13 PROCEDURE — 99999 PR PBB SHADOW E&M-EST. PATIENT-LVL III: ICD-10-PCS | Mod: PBBFAC,HCNC,, | Performed by: FAMILY MEDICINE

## 2020-10-13 PROCEDURE — 3074F SYST BP LT 130 MM HG: CPT | Mod: HCNC,CPTII,S$GLB, | Performed by: FAMILY MEDICINE

## 2020-10-13 PROCEDURE — 3078F DIAST BP <80 MM HG: CPT | Mod: HCNC,CPTII,S$GLB, | Performed by: FAMILY MEDICINE

## 2020-10-13 PROCEDURE — 90694 VACC AIIV4 NO PRSRV 0.5ML IM: CPT | Mod: HCNC,S$GLB,, | Performed by: FAMILY MEDICINE

## 2020-10-13 PROCEDURE — 3074F PR MOST RECENT SYSTOLIC BLOOD PRESSURE < 130 MM HG: ICD-10-PCS | Mod: HCNC,CPTII,S$GLB, | Performed by: FAMILY MEDICINE

## 2020-10-13 PROCEDURE — 1159F MED LIST DOCD IN RCRD: CPT | Mod: HCNC,S$GLB,, | Performed by: FAMILY MEDICINE

## 2020-10-13 PROCEDURE — 3078F PR MOST RECENT DIASTOLIC BLOOD PRESSURE < 80 MM HG: ICD-10-PCS | Mod: HCNC,CPTII,S$GLB, | Performed by: FAMILY MEDICINE

## 2020-10-13 PROCEDURE — 99213 PR OFFICE/OUTPT VISIT, EST, LEVL III, 20-29 MIN: ICD-10-PCS | Mod: HCNC,25,S$GLB, | Performed by: FAMILY MEDICINE

## 2020-10-13 RX ORDER — FLUTICASONE PROPIONATE AND SALMETEROL 500; 50 UG/1; UG/1
1 POWDER RESPIRATORY (INHALATION) 2 TIMES DAILY
Qty: 180 EACH | Refills: 5 | Status: SHIPPED | OUTPATIENT
Start: 2020-10-13 | End: 2021-01-28 | Stop reason: SDUPTHER

## 2020-10-13 RX ORDER — TIOTROPIUM BROMIDE 18 UG/1
18 CAPSULE ORAL; RESPIRATORY (INHALATION) DAILY
Qty: 30 CAPSULE | Refills: 9 | Status: SHIPPED | OUTPATIENT
Start: 2020-10-13 | End: 2021-01-20 | Stop reason: SDUPTHER

## 2020-10-13 NOTE — PROGRESS NOTES
[facility]  Progress Note    SUBJECTIVE:     Chief Complaint   Patient presents with    Hospital Follow Up       HPI   Enzo Wilson  is a 80 y.o. male here, with family who is primary caretaker/historian, for hospital follow-up for COPD exacerbation. Has been taking inhalers as directed. He admits still smoking. Breathing much better now. Currently on 2.5 L NC. Patient has no other new complaints/problems at this time.      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    Vocal cord polyps      Past Surgical History:   Procedure Laterality Date    EXCISION OF MASS OF BACK N/A 6/10/2019    Procedure: EXCISION, MASS, BACK;  Surgeon: Canelo Lockwood MD;  Location: [facility];  Service: General;  Laterality: N/A;    TONSILLECTOMY       Family History   Problem Relation Age of Onset    Heart disease Father     Hypertension Father     Hypertension Son     Heart disease Maternal Uncle     Hypertension Maternal Grandmother     Hypertension Maternal Grandfather     Kidney disease Mother      Social History     Tobacco Use    Smoking status: Current Some Day Smoker     Packs/day: 1.00     Years: 55.00     Pack years: 55.00    Smokeless tobacco: Never Used    Tobacco comment: has been weaning down   Substance Use Topics    Alcohol use: Yes     Alcohol/week: 6.0 standard drinks     Types: 6 Cans of beer per week     Comment: 1-2  DRINKS A WEEK    Drug use: No        Review of Systems   Constitutional: Negative for chills, fever and malaise/fatigue.   HENT: Negative.    Respiratory: Negative.  Negative for cough and shortness of breath.    Cardiovascular: Negative.  Negative for chest pain.   Gastrointestinal:  Negative.  Negative for abdominal pain, nausea and vomiting.   Genitourinary: Negative.    Neurological: Negative for weakness and headaches.   All other systems reviewed and are negative.    OBJECTIVE:     Vitals:    10/13/20 0917   BP: 110/64   Pulse: 97   Temp: 98.1 °F (36.7 °C)     Body mass index is 15.47 kg/m².    Physical Exam   Constitutional: He is oriented to person, place, and time and well-developed, well-nourished, and in no distress. No distress.   HENT:   Head: Normocephalic and atraumatic.   Nose: Nose normal.   Eyes: Conjunctivae and EOM are normal.   Cardiovascular: Normal rate, regular rhythm and normal heart sounds. Exam reveals no gallop and no friction rub.   No murmur heard.  Pulmonary/Chest: Effort normal. No respiratory distress. He has wheezes (b/l lobes, coarse breath sounds). He has no rales. He exhibits no tenderness.   Abdominal: Soft. Bowel sounds are normal. He exhibits no distension. There is no abdominal tenderness. There is no rebound.   Neurological: He is alert and oriented to person, place, and time.   Skin: Skin is warm. He is not diaphoretic.     Transitional Care Note    Family and/or Caretaker present at visit?  Yes.  Diagnostic tests reviewed/disposition: I have reviewed all completed as well as pending diagnostic tests at the time of discharge.  Disease/illness education: Yes  Home health/community services discussion/referrals: Patient does not have home health established from hospital visit.  They do not need home health.  If needed, we will set up home health for the patient.   Establishment or re-establishment of referral orders for community resources: No other necessary community resources.   Discussion with other health care providers: No discussion with other health care providers necessary.           Old records were reviewed. Labs and/or images were independently reviewed.    ASSESSMENT     1. Chronic obstructive pulmonary disease, unspecified COPD type    2.  Tobacco abuse    3. Need for immunization against influenza    4. Essential hypertension        PLAN:     Chronic obstructive pulmonary disease, unspecified COPD type  -     Start tiotropium (SPIRIVA) 18 mcg inhalation capsule; Inhale 1 capsule (18 mcg total) into the lungs once daily. Controller  Dispense: 30 capsule; Refill: 9  -     Increas fluticasone-salmeterol diskus inhaler 500-50 mcg; Inhale 1 puff into the lungs 2 (two) times daily. Controller  Dispense: 180 each; Refill: 5   -     Discussed importance of smoking cessation. He is willing to do smoking cessation program.    Tobacco abuse  -     Ambulatory referral/consult to Smoking Cessation Program; Future; Expected date: 10/20/2020    Need for immunization against influenza  -     Influenza - Quadrivalent (Adjuvanted)    Essential hypertension   -     Stable. Continue current regimen.      RTC PRMARGOT Self MD  10/13/2020 10:53 AM

## 2020-10-20 ENCOUNTER — CLINICAL SUPPORT (OUTPATIENT)
Dept: SMOKING CESSATION | Facility: CLINIC | Age: 80
End: 2020-10-20
Payer: COMMERCIAL

## 2020-10-20 DIAGNOSIS — F17.200 NICOTINE DEPENDENCE: Primary | ICD-10-CM

## 2020-10-20 PROCEDURE — 99999 PR PBB SHADOW E&M-EST. PATIENT-LVL I: ICD-10-PCS | Mod: PBBFAC,,,

## 2020-10-20 PROCEDURE — 99404 PREV MED CNSL INDIV APPRX 60: CPT | Mod: S$GLB,,,

## 2020-10-20 PROCEDURE — 99404 PR PREVENT COUNSEL,INDIV,60 MIN: ICD-10-PCS | Mod: S$GLB,,,

## 2020-10-20 PROCEDURE — 99999 PR PBB SHADOW E&M-EST. PATIENT-LVL I: CPT | Mod: PBBFAC,,,

## 2020-10-20 RX ORDER — IBUPROFEN 200 MG
1 TABLET ORAL DAILY
Qty: 14 PATCH | Refills: 0 | Status: SHIPPED | OUTPATIENT
Start: 2020-10-20 | End: 2020-11-03 | Stop reason: SDUPTHER

## 2020-10-20 NOTE — Clinical Note
Pt presents brysone a telephonic intake smoking 10 cigarettes per day, he has smoked for over 55 yrs, this has been a struggle for him but he knows for his declining health he has to quit, recommend the 14mg nicotine patch daily and an abrupt quit. If patient continues to smoke while on the 14mg nicotine patch will advise for high dose, will wait and see how he does, intake note:  discussed strategies to help cut back and to help break the routine and habit associated with smoking, intake handout provided to the patient and discussed, all prescribed NRT discussed in depth as well as tobacco cessation

## 2020-10-27 ENCOUNTER — CLINICAL SUPPORT (OUTPATIENT)
Dept: SMOKING CESSATION | Facility: CLINIC | Age: 80
End: 2020-10-27
Payer: COMMERCIAL

## 2020-10-27 DIAGNOSIS — F17.200 NICOTINE DEPENDENCE: Primary | ICD-10-CM

## 2020-10-27 PROCEDURE — 99407 PR TOBACCO USE CESSATION INTENSIVE >10 MINUTES: ICD-10-PCS | Mod: S$GLB,,,

## 2020-10-27 PROCEDURE — 99407 BEHAV CHNG SMOKING > 10 MIN: CPT | Mod: S$GLB,,,

## 2020-10-31 ENCOUNTER — EXTERNAL CHRONIC CARE MANAGEMENT (OUTPATIENT)
Dept: PRIMARY CARE CLINIC | Facility: CLINIC | Age: 80
End: 2020-10-31
Payer: MEDICARE

## 2020-10-31 PROCEDURE — 99490 PR CHRONIC CARE MGMT, 1ST 20 MIN: ICD-10-PCS | Mod: S$GLB,,, | Performed by: FAMILY MEDICINE

## 2020-10-31 PROCEDURE — 99490 CHRNC CARE MGMT STAFF 1ST 20: CPT | Mod: S$GLB,,, | Performed by: FAMILY MEDICINE

## 2020-11-03 ENCOUNTER — CLINICAL SUPPORT (OUTPATIENT)
Dept: SMOKING CESSATION | Facility: CLINIC | Age: 80
End: 2020-11-03
Payer: COMMERCIAL

## 2020-11-03 DIAGNOSIS — F17.200 NICOTINE DEPENDENCE: ICD-10-CM

## 2020-11-03 PROCEDURE — 99402 PREV MED CNSL INDIV APPRX 30: CPT | Mod: S$GLB,,,

## 2020-11-03 PROCEDURE — 99999 PR PBB SHADOW E&M-EST. PATIENT-LVL I: ICD-10-PCS | Mod: PBBFAC,,,

## 2020-11-03 PROCEDURE — 99402 PR PREVENT COUNSEL,INDIV,30 MIN: ICD-10-PCS | Mod: S$GLB,,,

## 2020-11-03 PROCEDURE — 99999 PR PBB SHADOW E&M-EST. PATIENT-LVL I: CPT | Mod: PBBFAC,,,

## 2020-11-03 RX ORDER — IBUPROFEN 200 MG
1 TABLET ORAL DAILY
Qty: 14 PATCH | Refills: 0 | Status: SHIPPED | OUTPATIENT
Start: 2020-11-03 | End: 2020-12-04

## 2020-11-03 NOTE — Clinical Note
Comments: pt presents for follow up telephonically, he continues to be a non-smoker! He quit smoking on 10/20 and has not had any slips, he is currently on the 14mg nicotine patch daily and comfortable. He is determined not to smoke for his health and the cost of the cigarettes, recommend that he remain on the dose he is currently on and will wean the nicotine patch at the patients comfort. Session handout discussed, will follow

## 2020-11-03 NOTE — PROGRESS NOTES
Individual Follow-Up Form    11/3/2020    Quit Date: 10/20    Clinical Status of Patient: Outpatient    Length of Service: 30 minutes    Continuing Medication: yes  Patches    Other Medications: n/a     Target Symptoms: Withdrawal and medication side effects. The following were  rated moderate (3) to severe (4) on TCRS:  · Moderate (3): 0  · Severe (4): 0     Comments: pt presents for follow up telephonically, he continues to be a non-smoker! He quit smoking on 10/20 and has not had any slips, he is currently on the 14mg nicotine patch daily and comfortable. He is determined not to smoke for his health and the cost of the cigarettes, recommend that he remain on the dose he is currently on and will wean the nicotine patch at the patients comfort. 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

## 2020-11-10 ENCOUNTER — TELEPHONE (OUTPATIENT)
Dept: SMOKING CESSATION | Facility: CLINIC | Age: 80
End: 2020-11-10

## 2020-11-12 ENCOUNTER — TELEPHONE (OUTPATIENT)
Dept: SMOKING CESSATION | Facility: CLINIC | Age: 80
End: 2020-11-12

## 2020-11-30 ENCOUNTER — EXTERNAL CHRONIC CARE MANAGEMENT (OUTPATIENT)
Dept: PRIMARY CARE CLINIC | Facility: CLINIC | Age: 80
End: 2020-11-30
Payer: MEDICARE

## 2020-11-30 PROCEDURE — 99490 CHRNC CARE MGMT STAFF 1ST 20: CPT | Mod: S$GLB,,, | Performed by: FAMILY MEDICINE

## 2020-11-30 PROCEDURE — 99490 PR CHRONIC CARE MGMT, 1ST 20 MIN: ICD-10-PCS | Mod: S$GLB,,, | Performed by: FAMILY MEDICINE

## 2020-12-11 ENCOUNTER — PATIENT MESSAGE (OUTPATIENT)
Dept: OTHER | Facility: OTHER | Age: 80
End: 2020-12-11

## 2020-12-31 ENCOUNTER — EXTERNAL CHRONIC CARE MANAGEMENT (OUTPATIENT)
Dept: PRIMARY CARE CLINIC | Facility: CLINIC | Age: 80
End: 2020-12-31
Payer: MEDICARE

## 2020-12-31 PROCEDURE — G2058 PR CHRON CARE MGMT, EA ADDTL 20 MINS: ICD-10-PCS | Mod: S$GLB,,, | Performed by: FAMILY MEDICINE

## 2020-12-31 PROCEDURE — G2058 CCM ADD 20MIN: HCPCS | Mod: S$GLB,,, | Performed by: FAMILY MEDICINE

## 2020-12-31 PROCEDURE — 99490 PR CHRONIC CARE MGMT, 1ST 20 MIN: ICD-10-PCS | Mod: S$GLB,,, | Performed by: FAMILY MEDICINE

## 2020-12-31 PROCEDURE — 99490 CHRNC CARE MGMT STAFF 1ST 20: CPT | Mod: S$GLB,,, | Performed by: FAMILY MEDICINE

## 2021-01-11 DIAGNOSIS — I10 ESSENTIAL HYPERTENSION: Chronic | ICD-10-CM

## 2021-01-12 RX ORDER — AMLODIPINE BESYLATE 10 MG/1
TABLET ORAL
Qty: 90 TABLET | Refills: 3 | Status: SHIPPED | OUTPATIENT
Start: 2021-01-12 | End: 2021-01-20 | Stop reason: SDUPTHER

## 2021-01-20 ENCOUNTER — CLINICAL SUPPORT (OUTPATIENT)
Dept: SMOKING CESSATION | Facility: CLINIC | Age: 81
End: 2021-01-20
Payer: COMMERCIAL

## 2021-01-20 DIAGNOSIS — I10 ESSENTIAL HYPERTENSION: Chronic | ICD-10-CM

## 2021-01-20 DIAGNOSIS — J44.9 CHRONIC OBSTRUCTIVE PULMONARY DISEASE, UNSPECIFIED COPD TYPE: Chronic | ICD-10-CM

## 2021-01-20 DIAGNOSIS — F17.200 NICOTINE DEPENDENCE: Primary | ICD-10-CM

## 2021-01-20 PROCEDURE — 99404 PREV MED CNSL INDIV APPRX 60: CPT | Mod: S$GLB,,,

## 2021-01-20 PROCEDURE — 99999 PR PBB SHADOW E&M-EST. PATIENT-LVL I: ICD-10-PCS | Mod: PBBFAC,,,

## 2021-01-20 PROCEDURE — 99999 PR PBB SHADOW E&M-EST. PATIENT-LVL I: CPT | Mod: PBBFAC,,,

## 2021-01-20 PROCEDURE — 99404 PR PREVENT COUNSEL,INDIV,60 MIN: ICD-10-PCS | Mod: S$GLB,,,

## 2021-01-20 RX ORDER — IBUPROFEN 200 MG
1 TABLET ORAL DAILY
Qty: 14 PATCH | Refills: 0 | Status: SHIPPED | OUTPATIENT
Start: 2021-01-20 | End: 2021-02-12

## 2021-01-20 RX ORDER — AMLODIPINE BESYLATE 10 MG/1
10 TABLET ORAL DAILY
Qty: 90 TABLET | Refills: 3 | Status: SHIPPED | OUTPATIENT
Start: 2021-01-20

## 2021-01-20 RX ORDER — TIOTROPIUM BROMIDE 18 UG/1
18 CAPSULE ORAL; RESPIRATORY (INHALATION) DAILY
Qty: 30 CAPSULE | Refills: 9 | Status: SHIPPED | OUTPATIENT
Start: 2021-01-20 | End: 2022-01-20

## 2021-01-20 RX ORDER — IPRATROPIUM BROMIDE AND ALBUTEROL SULFATE 2.5; .5 MG/3ML; MG/3ML
SOLUTION RESPIRATORY (INHALATION)
Qty: 1080 ML | Refills: 1 | Status: SHIPPED | OUTPATIENT
Start: 2021-01-20

## 2021-01-22 ENCOUNTER — PATIENT MESSAGE (OUTPATIENT)
Dept: ADMINISTRATIVE | Facility: OTHER | Age: 81
End: 2021-01-22

## 2021-01-27 ENCOUNTER — CLINICAL SUPPORT (OUTPATIENT)
Dept: SMOKING CESSATION | Facility: CLINIC | Age: 81
End: 2021-01-27
Payer: COMMERCIAL

## 2021-01-27 DIAGNOSIS — F17.200 NICOTINE DEPENDENCE: Primary | ICD-10-CM

## 2021-01-27 PROCEDURE — 99407 BEHAV CHNG SMOKING > 10 MIN: CPT | Mod: S$GLB,,,

## 2021-01-27 PROCEDURE — 99407 PR TOBACCO USE CESSATION INTENSIVE >10 MINUTES: ICD-10-PCS | Mod: S$GLB,,,

## 2021-01-28 ENCOUNTER — TELEPHONE (OUTPATIENT)
Dept: FAMILY MEDICINE | Facility: CLINIC | Age: 81
End: 2021-01-28

## 2021-01-28 DIAGNOSIS — J44.9 CHRONIC OBSTRUCTIVE PULMONARY DISEASE, UNSPECIFIED COPD TYPE: Chronic | ICD-10-CM

## 2021-01-28 RX ORDER — FLUTICASONE PROPIONATE AND SALMETEROL 500; 50 UG/1; UG/1
1 POWDER RESPIRATORY (INHALATION) 2 TIMES DAILY
Qty: 180 EACH | Refills: 5 | Status: SHIPPED | OUTPATIENT
Start: 2021-01-28 | End: 2022-01-28

## 2021-01-31 ENCOUNTER — EXTERNAL CHRONIC CARE MANAGEMENT (OUTPATIENT)
Dept: PRIMARY CARE CLINIC | Facility: CLINIC | Age: 81
End: 2021-01-31
Payer: MEDICARE

## 2021-01-31 PROCEDURE — 99439 CHRNC CARE MGMT STAF EA ADDL: CPT | Mod: S$GLB,,, | Performed by: FAMILY MEDICINE

## 2021-01-31 PROCEDURE — 99439 PR CHRONIC CARE MGMT, EA ADDTL 20 MIN: ICD-10-PCS | Mod: S$GLB,,, | Performed by: FAMILY MEDICINE

## 2021-01-31 PROCEDURE — 99490 CHRNC CARE MGMT STAFF 1ST 20: CPT | Mod: S$GLB,,, | Performed by: FAMILY MEDICINE

## 2021-01-31 PROCEDURE — 99490 PR CHRONIC CARE MGMT, 1ST 20 MIN: ICD-10-PCS | Mod: S$GLB,,, | Performed by: FAMILY MEDICINE

## 2021-02-03 ENCOUNTER — CLINICAL SUPPORT (OUTPATIENT)
Dept: SMOKING CESSATION | Facility: CLINIC | Age: 81
End: 2021-02-03
Payer: COMMERCIAL

## 2021-02-03 DIAGNOSIS — F17.200 NICOTINE DEPENDENCE: Primary | ICD-10-CM

## 2021-02-03 PROCEDURE — 99402 PR PREVENT COUNSEL,INDIV,30 MIN: ICD-10-PCS | Mod: S$GLB,,,

## 2021-02-03 PROCEDURE — 99999 PR PBB SHADOW E&M-EST. PATIENT-LVL I: ICD-10-PCS | Mod: PBBFAC,,,

## 2021-02-03 PROCEDURE — 99999 PR PBB SHADOW E&M-EST. PATIENT-LVL I: CPT | Mod: PBBFAC,,,

## 2021-02-03 PROCEDURE — 99402 PREV MED CNSL INDIV APPRX 30: CPT | Mod: S$GLB,,,

## 2021-02-12 ENCOUNTER — CLINICAL SUPPORT (OUTPATIENT)
Dept: SMOKING CESSATION | Facility: CLINIC | Age: 81
End: 2021-02-12
Payer: COMMERCIAL

## 2021-02-12 DIAGNOSIS — F17.200 NICOTINE DEPENDENCE: Primary | ICD-10-CM

## 2021-02-12 PROCEDURE — 99999 PR PBB SHADOW E&M-EST. PATIENT-LVL I: CPT | Mod: PBBFAC,,,

## 2021-02-12 PROCEDURE — 99402 PR PREVENT COUNSEL,INDIV,30 MIN: ICD-10-PCS | Mod: S$GLB,,,

## 2021-02-12 PROCEDURE — 99402 PREV MED CNSL INDIV APPRX 30: CPT | Mod: S$GLB,,,

## 2021-02-12 PROCEDURE — 99999 PR PBB SHADOW E&M-EST. PATIENT-LVL I: ICD-10-PCS | Mod: PBBFAC,,,

## 2021-02-12 RX ORDER — NICOTINE 7MG/24HR
1 PATCH, TRANSDERMAL 24 HOURS TRANSDERMAL DAILY
Qty: 14 PATCH | Refills: 0 | Status: SHIPPED | OUTPATIENT
Start: 2021-02-12 | End: 2021-03-12

## 2021-02-28 ENCOUNTER — EXTERNAL CHRONIC CARE MANAGEMENT (OUTPATIENT)
Dept: PRIMARY CARE CLINIC | Facility: CLINIC | Age: 81
End: 2021-02-28
Payer: MEDICARE

## 2021-02-28 PROCEDURE — 99490 CHRNC CARE MGMT STAFF 1ST 20: CPT | Mod: S$GLB,,, | Performed by: FAMILY MEDICINE

## 2021-02-28 PROCEDURE — 99490 PR CHRONIC CARE MGMT, 1ST 20 MIN: ICD-10-PCS | Mod: S$GLB,,, | Performed by: FAMILY MEDICINE

## 2021-03-19 ENCOUNTER — TELEPHONE (OUTPATIENT)
Dept: FAMILY MEDICINE | Facility: CLINIC | Age: 81
End: 2021-03-19

## 2021-03-22 ENCOUNTER — TELEPHONE (OUTPATIENT)
Dept: FAMILY MEDICINE | Facility: CLINIC | Age: 81
End: 2021-03-22

## 2021-04-08 ENCOUNTER — TELEPHONE (OUTPATIENT)
Dept: SMOKING CESSATION | Facility: CLINIC | Age: 81
End: 2021-04-08

## (undated) DEVICE — SUT 3-0 VICRYL / SH (J416)

## (undated) DEVICE — UNDERGLOVE BIOGEL PI SZ 6.5 LF

## (undated) DEVICE — GLOVE BIOGEL PI MICRO SZ 6

## (undated) DEVICE — APPLICATOR CHLORAPREP ORN 26ML

## (undated) DEVICE — GLOVE BIOGEL 7.5

## (undated) DEVICE — SUT ETHILON 3/0 18IN PS-1

## (undated) DEVICE — CONTAINER SPECIMEN STRL 4OZ

## (undated) DEVICE — SUPPORT ULNA NERVE PROTECTOR

## (undated) DEVICE — SYR 10CC LUER LOCK

## (undated) DEVICE — SEE MEDLINE ITEM 146420

## (undated) DEVICE — SEE MEDLINE ITEM 146313

## (undated) DEVICE — GLOVE BIOGEL ECLIPSE SZ 6

## (undated) DEVICE — POSITIONER HEAD DONUT 9IN FOAM

## (undated) DEVICE — SEE MEDLINE ITEM 157117

## (undated) DEVICE — BLANKET LOWER BODY 55.9X40.2IN

## (undated) DEVICE — SEE L#120831

## (undated) DEVICE — ELECTRODE REM PLYHSV RETURN 9

## (undated) DEVICE — Device

## (undated) DEVICE — SUT CTD VICRYL 3-0 CR/SH

## (undated) DEVICE — COVER OVERHEAD SURG LT BLUE

## (undated) DEVICE — PACK DRAPE UNIVERSAL CONVERTOR

## (undated) DEVICE — GLOVE SURGICAL LATEX SZ 6.5

## (undated) DEVICE — NDL HYPO REG 25G X 1 1/2

## (undated) DEVICE — STAPLER SKIN ROTATING HEAD

## (undated) DEVICE — UNDERGLOVES BIOGEL PI SZ 7 LF

## (undated) DEVICE — GLOVE SURGICAL LATEX SZ 7